# Patient Record
Sex: FEMALE | Race: WHITE | Employment: OTHER | URBAN - NONMETROPOLITAN AREA
[De-identification: names, ages, dates, MRNs, and addresses within clinical notes are randomized per-mention and may not be internally consistent; named-entity substitution may affect disease eponyms.]

---

## 2017-01-03 DIAGNOSIS — K21.9 ESOPHAGEAL REFLUX: Primary | ICD-10-CM

## 2017-01-04 RX ORDER — PANTOPRAZOLE SODIUM 40 MG/1
TABLET, DELAYED RELEASE ORAL
Qty: 90 TABLET | Refills: 2 | Status: SHIPPED | OUTPATIENT
Start: 2017-01-04 | End: 2017-10-11

## 2017-01-12 DIAGNOSIS — E78.5 HYPERLIPIDEMIA LDL GOAL <160: Primary | ICD-10-CM

## 2017-01-13 DIAGNOSIS — G47.00 PERSISTENT INSOMNIA: Primary | ICD-10-CM

## 2017-01-16 DIAGNOSIS — F32.A DEPRESSION: Primary | ICD-10-CM

## 2017-01-16 RX ORDER — CITALOPRAM HYDROBROMIDE 20 MG/1
20 TABLET ORAL DAILY
Qty: 90 TABLET | Refills: 0 | Status: SHIPPED | OUTPATIENT
Start: 2017-01-16 | End: 2017-04-21

## 2017-01-16 RX ORDER — SIMVASTATIN 40 MG
TABLET ORAL
Qty: 90 TABLET | Refills: 0 | Status: SHIPPED | OUTPATIENT
Start: 2017-01-16 | End: 2017-04-27

## 2017-01-16 NOTE — TELEPHONE ENCOUNTER
ZOLPIDEM 10MG TAB    Last Written Prescription Date: 11/21/2016  Last Fill Quantity: 30,  # refills: 0   Last Office Visit with [facility], [facility] or [facility] prescribing provider: 11/15/2016

## 2017-01-17 RX ORDER — ZOLPIDEM TARTRATE 10 MG/1
TABLET ORAL
Qty: 30 TABLET | Refills: 0 | Status: SHIPPED | OUTPATIENT
Start: 2017-01-17 | End: 2017-02-27

## 2017-01-23 DIAGNOSIS — H10.33 ACUTE CONJUNCTIVITIS OF BOTH EYES, UNSPECIFIED ACUTE CONJUNCTIVITIS TYPE: Primary | ICD-10-CM

## 2017-01-25 RX ORDER — GENTAMICIN SULFATE 3 MG/ML
SOLUTION/ DROPS OPHTHALMIC
Qty: 15 ML | Refills: 0 | Status: SHIPPED | OUTPATIENT
Start: 2017-01-25 | End: 2017-03-16

## 2017-01-25 NOTE — TELEPHONE ENCOUNTER
Gentamicin 0.3% op solution       Last Written Prescription Date:  11-7-2016  Last Fill Quantity: Not Specified but requested 5 each.,   # refills: 0  Last Office Visit with G, P or [facility] prescribing provider: 11-  Future Office visit:       Routing refill request to provider for review/approval because:  Drug not active on patient's medication list

## 2017-02-03 ENCOUNTER — OFFICE VISIT (OUTPATIENT)
Dept: FAMILY MEDICINE | Facility: OTHER | Age: 62
End: 2017-02-03
Attending: FAMILY MEDICINE
Payer: MEDICARE

## 2017-02-03 VITALS
WEIGHT: 172 LBS | DIASTOLIC BLOOD PRESSURE: 82 MMHG | TEMPERATURE: 98.9 F | OXYGEN SATURATION: 97 % | BODY MASS INDEX: 29.51 KG/M2 | HEART RATE: 50 BPM | SYSTOLIC BLOOD PRESSURE: 122 MMHG | RESPIRATION RATE: 16 BRPM

## 2017-02-03 DIAGNOSIS — L20.84 INTRINSIC ECZEMA: ICD-10-CM

## 2017-02-03 DIAGNOSIS — R73.03 PREDIABETES: Primary | ICD-10-CM

## 2017-02-03 DIAGNOSIS — E78.2 MIXED HYPERLIPIDEMIA: ICD-10-CM

## 2017-02-03 LAB
ALBUMIN SERPL-MCNC: 3.9 G/DL (ref 3.4–5)
ALP SERPL-CCNC: 98 U/L (ref 40–150)
ALT SERPL W P-5'-P-CCNC: 34 U/L (ref 0–50)
ANION GAP SERPL CALCULATED.3IONS-SCNC: 8 MMOL/L (ref 3–14)
AST SERPL W P-5'-P-CCNC: 19 U/L (ref 0–45)
BASOPHILS # BLD AUTO: 0.1 10E9/L (ref 0–0.2)
BASOPHILS NFR BLD AUTO: 0.8 %
BILIRUB SERPL-MCNC: 0.5 MG/DL (ref 0.2–1.3)
BUN SERPL-MCNC: 10 MG/DL (ref 7–30)
CALCIUM SERPL-MCNC: 8.9 MG/DL (ref 8.5–10.1)
CHLORIDE SERPL-SCNC: 107 MMOL/L (ref 94–109)
CO2 SERPL-SCNC: 25 MMOL/L (ref 20–32)
CREAT SERPL-MCNC: 0.76 MG/DL (ref 0.52–1.04)
DIFFERENTIAL METHOD BLD: NORMAL
EOSINOPHIL # BLD AUTO: 0.1 10E9/L (ref 0–0.7)
EOSINOPHIL NFR BLD AUTO: 1.2 %
ERYTHROCYTE [DISTWIDTH] IN BLOOD BY AUTOMATED COUNT: 12.5 % (ref 10–15)
GFR SERPL CREATININE-BSD FRML MDRD: 77 ML/MIN/1.7M2
GLUCOSE SERPL-MCNC: 105 MG/DL (ref 70–99)
HCT VFR BLD AUTO: 39.3 % (ref 35–47)
HGB BLD-MCNC: 13.3 G/DL (ref 11.7–15.7)
IMM GRANULOCYTES # BLD: 0 10E9/L (ref 0–0.4)
IMM GRANULOCYTES NFR BLD: 0.3 %
LYMPHOCYTES # BLD AUTO: 2.5 10E9/L (ref 0.8–5.3)
LYMPHOCYTES NFR BLD AUTO: 32.7 %
MCH RBC QN AUTO: 30.9 PG (ref 26.5–33)
MCHC RBC AUTO-ENTMCNC: 33.8 G/DL (ref 31.5–36.5)
MCV RBC AUTO: 91 FL (ref 78–100)
MONOCYTES # BLD AUTO: 0.7 10E9/L (ref 0–1.3)
MONOCYTES NFR BLD AUTO: 8.8 %
NEUTROPHILS # BLD AUTO: 4.2 10E9/L (ref 1.6–8.3)
NEUTROPHILS NFR BLD AUTO: 56.2 %
NRBC # BLD AUTO: 0 10*3/UL
NRBC BLD AUTO-RTO: 0 /100
PLATELET # BLD AUTO: 272 10E9/L (ref 150–450)
POTASSIUM SERPL-SCNC: 4.3 MMOL/L (ref 3.4–5.3)
PROT SERPL-MCNC: 8 G/DL (ref 6.8–8.8)
RBC # BLD AUTO: 4.31 10E12/L (ref 3.8–5.2)
SODIUM SERPL-SCNC: 140 MMOL/L (ref 133–144)
TSH SERPL DL<=0.005 MIU/L-ACNC: 1.98 MU/L (ref 0.4–4)
WBC # BLD AUTO: 7.5 10E9/L (ref 4–11)

## 2017-02-03 PROCEDURE — 36415 COLL VENOUS BLD VENIPUNCTURE: CPT | Performed by: FAMILY MEDICINE

## 2017-02-03 PROCEDURE — 99212 OFFICE O/P EST SF 10 MIN: CPT

## 2017-02-03 PROCEDURE — 85025 COMPLETE CBC W/AUTO DIFF WBC: CPT | Performed by: FAMILY MEDICINE

## 2017-02-03 PROCEDURE — 84443 ASSAY THYROID STIM HORMONE: CPT | Performed by: FAMILY MEDICINE

## 2017-02-03 PROCEDURE — 99213 OFFICE O/P EST LOW 20 MIN: CPT | Performed by: FAMILY MEDICINE

## 2017-02-03 PROCEDURE — 80053 COMPREHEN METABOLIC PANEL: CPT | Performed by: FAMILY MEDICINE

## 2017-02-03 RX ORDER — MOMETASONE FUROATE 1 MG/G
CREAM TOPICAL
Qty: 15 G | Refills: 0 | Status: SHIPPED | OUTPATIENT
Start: 2017-02-03 | End: 2018-02-15

## 2017-02-03 ASSESSMENT — PAIN SCALES - GENERAL: PAINLEVEL: NO PAIN (0)

## 2017-02-03 NOTE — Clinical Note
[facility] [place]  [address]  [place] [address]  [phone]      February 6, 2017      Mary Pemberton  [address]  [place] [address]        Dear Mary,    The results of your recent blood draw were normal.  Results for orders placed or performed in visit on 02/03/17   CBC with platelets differential   Result Value Ref Range    WBC 7.5 4.0 - 11.0 10e9/L    RBC Count 4.31 3.8 - 5.2 10e12/L    Hemoglobin 13.3 11.7 - 15.7 g/dL    Hematocrit 39.3 35.0 - 47.0 %    MCV 91 78 - 100 fl    MCH 30.9 26.5 - 33.0 pg    MCHC 33.8 31.5 - 36.5 g/dL    RDW 12.5 10.0 - 15.0 %    Platelet Count 272 150 - 450 10e9/L    Diff Method Automated Method     % Neutrophils 56.2 %    % Lymphocytes 32.7 %    % Monocytes 8.8 %    % Eosinophils 1.2 %    % Basophils 0.8 %    % Immature Granulocytes 0.3 %    Nucleated RBCs 0 0 /100    Absolute Neutrophil 4.2 1.6 - 8.3 10e9/L    Absolute Lymphocytes 2.5 0.8 - 5.3 10e9/L    Absolute Monocytes 0.7 0.0 - 1.3 10e9/L    Absolute Eosinophils 0.1 0.0 - 0.7 10e9/L    Absolute Basophils 0.1 0.0 - 0.2 10e9/L    Abs Immature Granulocytes 0.0 0 - 0.4 10e9/L    Absolute Nucleated RBC 0.0    Comprehensive metabolic panel   Result Value Ref Range    Sodium 140 133 - 144 mmol/L    Potassium 4.3 3.4 - 5.3 mmol/L    Chloride 107 94 - 109 mmol/L    Carbon Dioxide 25 20 - 32 mmol/L    Anion Gap 8 3 - 14 mmol/L    Glucose 105 (H) 70 - 99 mg/dL    Urea Nitrogen 10 7 - 30 mg/dL    Creatinine 0.76 0.52 - 1.04 mg/dL    GFR Estimate 77 >60 mL/min/1.7m2    GFR Estimate If Black >90   GFR Calc   >60 mL/min/1.7m2    Calcium 8.9 8.5 - 10.1 mg/dL    Bilirubin Total 0.5 0.2 - 1.3 mg/dL    Albumin 3.9 3.4 - 5.0 g/dL    Protein Total 8.0 6.8 - 8.8 g/dL    Alkaline Phosphatase 98 40 - 150 U/L    ALT 34 0 - 50 U/L    AST 19 0 - 45 U/L   TSH with free T4 reflex   Result Value Ref Range    TSH 1.98 0.40 - 4.00 mU/L   If you have any questions or concerns, please call myself or my nurse at  [phone].      Sincerely,      Sam Tanner MD

## 2017-02-03 NOTE — NURSING NOTE
"Chief Complaint   Patient presents with     Eye Problem     has had medication to treat but as soon as medication is done it come back       Initial /82 mmHg  Pulse 50  Temp(Src) 98.9  F (37.2  C)  Resp 16  Wt 172 lb (78.019 kg)  SpO2 97% Estimated body mass index is 29.51 kg/(m^2) as calculated from the following:    Height as of 11/7/16: 5' 4\" (1.626 m).    Weight as of this encounter: 172 lb (78.019 kg).  BP completed using cuff size: monae Khan      "

## 2017-02-03 NOTE — MR AVS SNAPSHOT
"              After Visit Summary   2/3/2017    Mary Pemberton    MRN: [id number]           Patient Information     Date Of Birth          1955        Visit Information        Provider Department      2/3/2017 11:00 AM Sam Tanner MD [facility]        Today's Diagnoses     Prediabetes    -  1     Intrinsic eczema         Dyslipidemia           Care Instructions    Apply mometasone to the affected area as prescibed.        Follow-ups after your visit        Follow-up notes from your care team     Return if symptoms worsen or fail to improve.      Who to contact     If you have questions or need follow up information about today's clinic visit or your schedule please contact [facility] directly at [phone].  Normal or non-critical lab and imaging results will be communicated to you by MyChart, letter or phone within 4 business days after the clinic has received the results. If you do not hear from us within 7 days, please contact the clinic through MyChart or phone. If you have a critical or abnormal lab result, we will notify you by phone as soon as possible.  Submit refill requests through [employer] or call your pharmacy and they will forward the refill request to us. Please allow 3 business days for your refill to be completed.          Additional Information About Your Visit        MyChart Information     [employer] lets you send messages to your doctor, view your test results, renew your prescriptions, schedule appointments and more. To sign up, go to [email or web address] . Click on \"Log in\" on the left side of the screen, which will take you to the Welcome page. Then click on \"Sign up Now\" on the right side of the page.     You will be asked to enter the access code listed below, as well as some personal information. Please follow the directions to create your username and password.     Your access code is: [email or web address]  Expires: 5/5/2017 10:13 AM     Your access code will "  in 90 days. If you need help or a new code, please call your [facility] or [phone].        Care EveryWhere ID     This is your Care EveryWhere ID. This could be used by other organizations to access your [facility] medical records  [id number]        Your Vitals Were     Pulse Temperature Respirations Pulse Oximetry          50 98.9  F (37.2  C) 16 97%         Blood Pressure from Last 3 Encounters:   17 122/82   11/15/16 115/70   16 102/62    Weight from Last 3 Encounters:   17 172 lb (78.019 kg)   11/15/16 178 lb (80.74 kg)   16 178 lb (80.74 kg)              We Performed the Following     CBC with platelets differential     Comprehensive metabolic panel     TSH with free T4 reflex          Today's Medication Changes          These changes are accurate as of: 2/3/17 11:59 PM.  If you have any questions, ask your nurse or doctor.               Start taking these medicines.        Dose/Directions    mometasone 0.1 % cream   Commonly known as:  ELOCON   Used for:  Intrinsic eczema   Started by:  Sam Tanner MD        Apply sparingly to affected area twice daily as needed.  Do not apply to face.   Quantity:  15 g   Refills:  0            Where to get your medicines      These medications were sent to [id number] [address] [id number]   [address] , [place] [address]     Phone:  [phone]    - mometasone 0.1 % cream             Primary Care Provider Office Phone # Fax #    Sam Tanner [phone]       [facility] [address]  [place] [address]        Thank you!     Thank you for choosing [facility]  for your care. Our goal is always to provide you with excellent care. Hearing back from our patients is one way we can continue to improve our services. Please take a few minutes to complete the written survey that you may receive in the mail after your visit with us. Thank you!             Your Updated Medication  List - Protect others around you: Learn how to safely use, store and throw away your medicines at www.disposemymeds.org.          This list is accurate as of: 2/3/17 11:59 PM.  Always use your most recent med list.                   Brand Name Dispense Instructions for use    ASPIRIN ADULT LOW STRENGTH 81 MG chewable tablet   Generic drug:  aspirin     36 tablet    Take 162 mg by mouth daily       citalopram 20 MG tablet    celeXA    90 tablet    Take 1 tablet (20 mg) by mouth daily       cyanocolbalamin 100 MCG tablet    vitamin  B-12     Take 50 mcg by mouth daily.       donepezil 10 MG tablet    ARICEPT    90 tablet    TAKE ONE TABLET BY MOUTH AT BEDTIME       gentamicin 0.3 % ophthalmic solution    GARAMYCIN    15 mL    INSTILL ONE DROP EVERY 4 HOURS TO  AFFECTED  EYE(S)  FOR  7  DAYS       LORazepam 0.5 MG tablet    ATIVAN    60 tablet    TAKE ONE TABLET BY MOUTH TWICE DAILY AS NEEDED FOR ANXIETY       mometasone 0.1 % cream    ELOCON    15 g    Apply sparingly to affected area twice daily as needed.  Do not apply to face.       OMEGA-3 FISH OIL PO          * OXYBUTYNIN CHLORIDE PO      Take 5 mg by mouth At Bedtime       * oxybutynin 5 MG 24 hr tablet    DITROPAN-XL    90 tablet    TAKE ONE TABLET BY MOUTH ONCE DAILY       pantoprazole 40 MG EC tablet    PROTONIX    90 tablet    TAKE ONE TABLET BY MOUTH ONCE DAILY       simvastatin 40 MG tablet    ZOCOR    90 tablet    TAKE ONE TABLET BY MOUTH AT BEDTIME       zolpidem 10 MG tablet    AMBIEN    30 tablet    TAKE ONE TABLET BY MOUTH  AT BEDTIME AS NEEDED FOR SLEEP       * Notice:  This list has 2 medication(s) that are the same as other medications prescribed for you. Read the directions carefully, and ask your doctor or other care provider to review them with you.

## 2017-02-04 NOTE — PROGRESS NOTES
SUBJECTIVE:  Mary Pemberton, 61 year old, female is seen with persistent eye symptoms. She has had previous conjunctivitis and was placed on gentamycin drops. The will note improvement then recurrence.  More recently, she has developed scaling and itching of her lids and periorbital region.    She requests fasting labs    Denies fever, headache, visual disturbance, dizziness, focal weakness, numbness, tingling, paresthesias, tremor, or gait disturbance.      Current Outpatient Prescriptions   Medication Sig Dispense Refill     mometasone (ELOCON) 0.1 % cream Apply sparingly to affected area twice daily as needed.  Do not apply to face. 15 g 0     gentamicin (GARAMYCIN) 0.3 % ophthalmic solution INSTILL ONE DROP EVERY 4 HOURS TO  AFFECTED  EYE(S)  FOR  7  DAYS 15 mL 0     zolpidem (AMBIEN) 10 MG tablet TAKE ONE TABLET BY MOUTH  AT BEDTIME AS NEEDED FOR SLEEP 30 tablet 0     simvastatin (ZOCOR) 40 MG tablet TAKE ONE TABLET BY MOUTH AT BEDTIME 90 tablet 0     citalopram (CELEXA) 20 MG tablet Take 1 tablet (20 mg) by mouth daily 90 tablet 0     pantoprazole (PROTONIX) 40 MG EC tablet TAKE ONE TABLET BY MOUTH ONCE DAILY 90 tablet 2     LORazepam (ATIVAN) 0.5 MG tablet TAKE ONE TABLET BY MOUTH TWICE DAILY AS NEEDED FOR ANXIETY 60 tablet 0     oxybutynin (DITROPAN-XL) 5 MG 24 hr tablet TAKE ONE TABLET BY MOUTH ONCE DAILY 90 tablet 3     donepezil (ARICEPT) 10 MG tablet TAKE ONE TABLET BY MOUTH AT BEDTIME 90 tablet 1     OXYBUTYNIN CHLORIDE PO Take 5 mg by mouth At Bedtime       Omega-3 Fatty Acids (OMEGA-3 FISH OIL PO)        aspirin (ASPIRIN ADULT LOW STRENGTH) 81 MG chewable tablet Take 162 mg by mouth daily  36 tablet      cyanocolbalamin (VITAMIN  B-12) 100 MCG tablet Take 50 mcg by mouth daily.          Allergies   Allergen Reactions     Codeine Nausea and Vomiting     Codeine Phosphate      Quinolones      Pt intolerance to steroids also       Past Medical History   Diagnosis Date     Malignant melanoma of skin of  upper limb, including shoulder (H) 9/4/2001     Melanoma of skin, site unspecified 4/16/2002     Other and unspecified hyperlipidemia 9/12/2002     Lump or mass in breast 1/3/2000     Rash and other nonspecific skin eruption 11/7/2005     Localized superficial swelling, mass, or lump 9/9/2004     Other specified episodic mood disorder 8/16/2002     Depressive disorder, not elsewhere classified 4/22/2008     Pre-diabetes 9/13/2011     Dementia 10/19/2012     GERD (gastroesophageal reflux disease) 11/14/2012     CAD (coronary atherosclerotic disease) 11/14/2012     H/O: stroke 4/28/2015     CVA (cerebral infarction) 04/28/2015     with TPA     Atrial fibrillation (H)      with Bradycardia     Melanoma of skin (H) 4/16/2002      Problem list name updated by automated process. Provider to review     Past Surgical History   Procedure Laterality Date     Brain bx       Arm       LT     Open heart       Colonoscopy  [id number]     Family History   Problem Relation Age of Onset     CANCER Father      lung     CANCER Maternal Grandfather      Hypertension Mother      Social History     Social History     Marital Status:      Spouse Name: N/A     Number of Children: N/A     Years of Education: N/A     Occupational History      Retired      Disabled     Social History Main Topics     Smoking status: Former Smoker -- 1.00 packs/day for 20 years     Types: Cigarettes     Quit date: 06/06/2002     Smokeless tobacco: Never Used      Comment: year quit 2001; no passive exposure.     Alcohol Use: No     Drug Use: No     Sexual Activity: Not on file     Other Topics Concern     Blood Transfusions Yes     Caffeine Concern Yes     coffee - 2 cups daily     Exercise Yes     walking daily     Parent/Sibling W/ Cabg, Mi Or Angioplasty Before 65f 55m? No     Social History Narrative         Review Of Systems  Constitutional, HEENT, cardiovascular, pulmonary, gi and gu systems are negative, except as otherwise  noted.    OBJECTIVE:  Vitals: B/P: 122/82, T: 98.9, P: 50, R: 16    Exam:  Physical Exam   Constitutional: She is oriented to person, place, and time. She appears well-developed and well-nourished. No distress.   Eyes: Conjunctivae and EOM are normal. Pupils are equal, round, and reactive to light.   There is mild erythema and scaling noted in the periorbital region bilaterally.   Neurological: She is alert and oriented to person, place, and time.   Psychiatric: She has a normal mood and affect.     Other exam not repeated    Labs:  Office Visit on 05/20/2016   Component Date Value Ref Range Status     WBC 05/20/2016 7.2  4.0 - 11.0 10e9/L Final     RBC Count 05/20/2016 4.19  3.8 - 5.2 10e12/L Final     Hemoglobin 05/20/2016 13.0  11.7 - 15.7 g/dL Final     Hematocrit 05/20/2016 38.8  35.0 - 47.0 % Final     MCV 05/20/2016 93  78 - 100 fl Final     MCH 05/20/2016 31.0  26.5 - 33.0 pg Final     MCHC 05/20/2016 33.5  31.5 - 36.5 g/dL Final     RDW 05/20/2016 12.8  10.0 - 15.0 % Final     Platelet Count 05/20/2016 247  150 - 450 10e9/L Final     Diff Method 05/20/2016 Automated Method   Final     % Neutrophils 05/20/2016 52.5   Final     % Lymphocytes 05/20/2016 37.1   Final     % Monocytes 05/20/2016 8.1   Final     % Eosinophils 05/20/2016 1.3   Final     % Basophils 05/20/2016 0.7   Final     % Immature Granulocytes 05/20/2016 0.3   Final     Nucleated RBCs 05/20/2016 0  0 /100 Final     Absolute Neutrophil 05/20/2016 3.8  1.6 - 8.3 10e9/L Final     Absolute Lymphocytes 05/20/2016 2.7  0.8 - 5.3 10e9/L Final     Absolute Monocytes 05/20/2016 0.6  0.0 - 1.3 10e9/L Final     Absolute Eosinophils 05/20/2016 0.1  0.0 - 0.7 10e9/L Final     Absolute Basophils 05/20/2016 0.1  0.0 - 0.2 10e9/L Final     Abs Immature Granulocytes 05/20/2016 0.0  0 - 0.4 10e9/L Final     Absolute Nucleated RBC 05/20/2016 0.0   Final     Cholesterol 05/20/2016 232* <200 mg/dL Final    Desirable:       <200 mg/dl     Triglycerides 05/20/2016  295* <150 mg/dL Final    Comment: Borderline high:  150-199 mg/dl   High:             200-499 mg/dl   Very high:       >499 mg/dl   Fasting specimen       HDL Cholesterol 05/20/2016 42* >49 mg/dL Final     LDL Cholesterol Calculated 05/20/2016 131* <100 mg/dL Final    Comment: Above desirable:  100-129 mg/dl   Borderline High:  130-159 mg/dL   High:             160-189 mg/dL   Very high:       >189 mg/dl       Non HDL Cholesterol 05/20/2016 190* <130 mg/dL Final    Comment: Above Desirable:  130-159 mg/dl   Borderline high:  160-189 mg/dl   High:             190-219 mg/dl   Very high:       >219 mg/dl       Sodium 05/20/2016 140  133 - 144 mmol/L Final     Potassium 05/20/2016 4.2  3.4 - 5.3 mmol/L Final     Chloride 05/20/2016 107  94 - 109 mmol/L Final     Carbon Dioxide 05/20/2016 26  20 - 32 mmol/L Final     Anion Gap 05/20/2016 7  3 - 14 mmol/L Final     Glucose 05/20/2016 103* 70 - 99 mg/dL Final     Urea Nitrogen 05/20/2016 9  7 - 30 mg/dL Final     Creatinine 05/20/2016 0.74  0.52 - 1.04 mg/dL Final     GFR Estimate 05/20/2016 80  >60 mL/min/1.7m2 Final    Non  GFR Calc     GFR Estimate If Black 05/20/2016   >60 mL/min/1.7m2 Final                    Value:>90   GFR Calc       Calcium 05/20/2016 9.0  8.5 - 10.1 mg/dL Final     Bilirubin Total 05/20/2016 0.5  0.2 - 1.3 mg/dL Final     Albumin 05/20/2016 4.0  3.4 - 5.0 g/dL Final     Protein Total 05/20/2016 7.8  6.8 - 8.8 g/dL Final     Alkaline Phosphatase 05/20/2016 91  40 - 150 U/L Final     ALT 05/20/2016 48  0 - 50 U/L Final     AST 05/20/2016 26  0 - 45 U/L Final     Color Urine 05/20/2016 Yellow   Final     Appearance Urine 05/20/2016 Clear   Final     Glucose Urine 05/20/2016 Negative  NEG mg/dL Final     Bilirubin Urine 05/20/2016 * NEG Final                    Value:Small  This is an unconfirmed screening test result. A positive result may be false.       Ketones Urine 05/20/2016 Negative  NEG mg/dL Final     Specific  Gravity Urine 05/20/2016 1.005  1.003 - 1.035 Final     Blood Urine 05/20/2016 Trace* NEG Final     pH Urine 05/20/2016 8.0  4.7 - 8.0 pH Final     Protein Albumin Urine 05/20/2016 10* NEG mg/dL Final     Urobilinogen mg/dL 05/20/2016 Normal  0.0 - 2.0 mg/dL Final     Nitrite Urine 05/20/2016 Negative  NEG Final     Leukocyte Esterase Urine 05/20/2016 Trace* NEG Final     Source 05/20/2016 Midstream Urine   Final     RBC Urine 05/20/2016 <1  0 - 2 /HPF Final     WBC Urine 05/20/2016 1  0 - 2 /HPF Final     Mucous Urine 05/20/2016 Present* NEG /LPF Final     Amorphous Crystals 05/20/2016 Few* NEG /HPF Final     Hemoglobin A1C 05/20/2016 5.9  4.3 - 6.0 % Final     Estimated Average Glucose 05/20/2016 123   Final       ASSESSMENT/PLAN:  Intrinsic eczema  Cautions mometasone cream as written  - mometasone (ELOCON) 0.1 % cream; Apply sparingly to affected area twice daily as needed.  Do not apply to face.    Prediabetes  Labs are drawn.    Dyslipidemia  As above.  - CBC with platelets differential  - Comprehensive metabolic panel  - TSH with free T4 reflex            Sam Tanner MD

## 2017-02-27 DIAGNOSIS — G47.00 PERSISTENT INSOMNIA: ICD-10-CM

## 2017-02-28 RX ORDER — ZOLPIDEM TARTRATE 10 MG/1
TABLET ORAL
Qty: 30 TABLET | Refills: 0 | Status: SHIPPED | OUTPATIENT
Start: 2017-02-28 | End: 2017-04-17

## 2017-03-16 ENCOUNTER — HOSPITAL ENCOUNTER (EMERGENCY)
Facility: HOSPITAL | Age: 62
Discharge: ANOTHER HOSPITAL | End: 2017-03-16
Attending: FAMILY MEDICINE | Admitting: FAMILY MEDICINE
Payer: MEDICARE

## 2017-03-16 VITALS
OXYGEN SATURATION: 95 % | HEIGHT: 64 IN | DIASTOLIC BLOOD PRESSURE: 83 MMHG | SYSTOLIC BLOOD PRESSURE: 120 MMHG | RESPIRATION RATE: 16 BRPM | TEMPERATURE: 97.6 F

## 2017-03-16 DIAGNOSIS — I69.319 COGNITIVE DEFICIT FOLLOWING CEREBROVASCULAR ACCIDENT (CVA): ICD-10-CM

## 2017-03-16 DIAGNOSIS — G45.9 TRANSIENT CEREBRAL ISCHEMIA, UNSPECIFIED TYPE: ICD-10-CM

## 2017-03-16 LAB
ALBUMIN SERPL-MCNC: 3.7 G/DL (ref 3.4–5)
ALP SERPL-CCNC: 84 U/L (ref 40–150)
ALT SERPL W P-5'-P-CCNC: 33 U/L (ref 0–50)
ANION GAP SERPL CALCULATED.3IONS-SCNC: 7 MMOL/L (ref 3–14)
APTT PPP: 26 SEC (ref 24–37)
AST SERPL W P-5'-P-CCNC: 20 U/L (ref 0–45)
BASOPHILS # BLD AUTO: 0.1 10E9/L (ref 0–0.2)
BASOPHILS NFR BLD AUTO: 0.8 %
BILIRUB SERPL-MCNC: 0.4 MG/DL (ref 0.2–1.3)
BUN SERPL-MCNC: 11 MG/DL (ref 7–30)
CALCIUM SERPL-MCNC: 8.3 MG/DL (ref 8.5–10.1)
CHLORIDE SERPL-SCNC: 103 MMOL/L (ref 94–109)
CO2 SERPL-SCNC: 25 MMOL/L (ref 20–32)
CREAT SERPL-MCNC: 0.84 MG/DL (ref 0.52–1.04)
DIFFERENTIAL METHOD BLD: ABNORMAL
EOSINOPHIL # BLD AUTO: 0.1 10E9/L (ref 0–0.7)
EOSINOPHIL NFR BLD AUTO: 1.2 %
ERYTHROCYTE [DISTWIDTH] IN BLOOD BY AUTOMATED COUNT: 12.6 % (ref 10–15)
GFR SERPL CREATININE-BSD FRML MDRD: 69 ML/MIN/1.7M2
GLUCOSE SERPL-MCNC: 131 MG/DL (ref 70–99)
HCT VFR BLD AUTO: 33.9 % (ref 35–47)
HGB BLD-MCNC: 11.7 G/DL (ref 11.7–15.7)
IMM GRANULOCYTES # BLD: 0 10E9/L (ref 0–0.4)
IMM GRANULOCYTES NFR BLD: 0.4 %
INR PPP: 1.02 (ref 0.8–1.2)
LYMPHOCYTES # BLD AUTO: 2.9 10E9/L (ref 0.8–5.3)
LYMPHOCYTES NFR BLD AUTO: 38.8 %
MCH RBC QN AUTO: 31.3 PG (ref 26.5–33)
MCHC RBC AUTO-ENTMCNC: 34.5 G/DL (ref 31.5–36.5)
MCV RBC AUTO: 91 FL (ref 78–100)
MONOCYTES # BLD AUTO: 0.7 10E9/L (ref 0–1.3)
MONOCYTES NFR BLD AUTO: 9.7 %
NEUTROPHILS # BLD AUTO: 3.7 10E9/L (ref 1.6–8.3)
NEUTROPHILS NFR BLD AUTO: 49.1 %
NRBC # BLD AUTO: 0 10*3/UL
NRBC BLD AUTO-RTO: 0 /100
PLATELET # BLD AUTO: 242 10E9/L (ref 150–450)
POTASSIUM SERPL-SCNC: 3.9 MMOL/L (ref 3.4–5.3)
PROT SERPL-MCNC: 7 G/DL (ref 6.8–8.8)
RBC # BLD AUTO: 3.74 10E12/L (ref 3.8–5.2)
SODIUM SERPL-SCNC: 135 MMOL/L (ref 133–144)
TROPONIN I SERPL-MCNC: NORMAL UG/L (ref 0–0.04)
WBC # BLD AUTO: 7.5 10E9/L (ref 4–11)

## 2017-03-16 PROCEDURE — 85730 THROMBOPLASTIN TIME PARTIAL: CPT | Performed by: FAMILY MEDICINE

## 2017-03-16 PROCEDURE — 70450 CT HEAD/BRAIN W/O DYE: CPT | Mod: TC

## 2017-03-16 PROCEDURE — 99291 CRITICAL CARE FIRST HOUR: CPT | Mod: 25

## 2017-03-16 PROCEDURE — 85610 PROTHROMBIN TIME: CPT | Performed by: FAMILY MEDICINE

## 2017-03-16 PROCEDURE — 80053 COMPREHEN METABOLIC PANEL: CPT | Performed by: FAMILY MEDICINE

## 2017-03-16 PROCEDURE — 25000128 H RX IP 250 OP 636: Performed by: FAMILY MEDICINE

## 2017-03-16 PROCEDURE — 84484 ASSAY OF TROPONIN QUANT: CPT | Performed by: FAMILY MEDICINE

## 2017-03-16 PROCEDURE — 93010 ELECTROCARDIOGRAM REPORT: CPT | Performed by: INTERNAL MEDICINE

## 2017-03-16 PROCEDURE — 70498 CT ANGIOGRAPHY NECK: CPT | Mod: TC

## 2017-03-16 PROCEDURE — A9270 NON-COVERED ITEM OR SERVICE: HCPCS | Mod: GY

## 2017-03-16 PROCEDURE — 85025 COMPLETE CBC W/AUTO DIFF WBC: CPT | Performed by: FAMILY MEDICINE

## 2017-03-16 PROCEDURE — 70496 CT ANGIOGRAPHY HEAD: CPT | Mod: TC

## 2017-03-16 PROCEDURE — 71010 ZZHC CHEST ONE VIEW: CPT | Mod: TC

## 2017-03-16 PROCEDURE — 93005 ELECTROCARDIOGRAM TRACING: CPT

## 2017-03-16 PROCEDURE — 00000146 ZZHCL STATISTIC GLUCOSE BY METER IP: Performed by: FAMILY MEDICINE

## 2017-03-16 PROCEDURE — 25000132 ZZH RX MED GY IP 250 OP 250 PS 637: Mod: GY

## 2017-03-16 PROCEDURE — 36415 COLL VENOUS BLD VENIPUNCTURE: CPT | Performed by: FAMILY MEDICINE

## 2017-03-16 PROCEDURE — 99285 EMERGENCY DEPT VISIT HI MDM: CPT | Performed by: FAMILY MEDICINE

## 2017-03-16 RX ORDER — ACETAMINOPHEN 325 MG/1
TABLET ORAL
Status: COMPLETED
Start: 2017-03-16 | End: 2017-03-16

## 2017-03-16 RX ORDER — IOPAMIDOL 612 MG/ML
100 INJECTION, SOLUTION INTRAVASCULAR ONCE
Status: COMPLETED | OUTPATIENT
Start: 2017-03-16 | End: 2017-03-16

## 2017-03-16 RX ORDER — ACETAMINOPHEN 325 MG/1
650 TABLET ORAL ONCE
Status: COMPLETED | OUTPATIENT
Start: 2017-03-16 | End: 2017-03-16

## 2017-03-16 RX ADMIN — ACETAMINOPHEN 650 MG: 325 TABLET ORAL at 23:49

## 2017-03-16 RX ADMIN — IOPAMIDOL 100 ML: 612 INJECTION, SOLUTION INTRAVASCULAR at 21:15

## 2017-03-16 RX ADMIN — ACETAMINOPHEN 650 MG: 325 TABLET, FILM COATED ORAL at 23:49

## 2017-03-16 RX ADMIN — SODIUM CHLORIDE 500 ML: 9 INJECTION, SOLUTION INTRAVENOUS at 20:08

## 2017-03-16 NOTE — ED NOTES
Presents to triage with  for slurred speech that started approx 25 mins ago.  Brought right back to room 3 pt refused a w/c, ambulatory steady on feet.

## 2017-03-16 NOTE — ED PROVIDER NOTES
"eMERGENCY dEPARTMENT eNCOUnter        CHIEF COMPLAINT    Chief Complaint   Patient presents with     Slurred Speech     Started at 1700       HPI    Mary Pemberton is a 61 year old female who presents with slurred speech which started 1 1/2 hours ago.  She had 2 CVAs in 2015 in [place] that presented the same way.  The first she was given TPA and symptoms resolved.  Then had another stroke 10 days later.  After evaluation she was told she had a \"blocked vessel\" deep in her brain.  She is on an ASA.  She also has a history of metastatic melanoma with a tumor in her right atrium that was resected, the atrium repaired with porcine tissue.  She also had a \"dark spot\" in her brain that was also resected but  said this was non melanoma.    Her  [place] says she started with slurred speech tonight.  They came in by private car, it's a little unclear why the delay.    REVIEW OF SYSTEMS    Cardiac: no Chest Pain, No syncope  Respiratory: No cough or hemoptysis  GI: No Vomiting or Diarrhea  : No Dysuria or Hematuria  General: No Fever or Chills  All other systems reviewed and are negative.    PAST MEDICAL & SURGICAL HISTORY    Past Medical History   Diagnosis Date     Atrial fibrillation (H)      with Bradycardia     CAD (coronary atherosclerotic disease) 11/14/2012     CVA (cerebral infarction) 04/28/2015     with TPA     Dementia 10/19/2012     Depressive disorder, not elsewhere classified 4/22/2008     GERD (gastroesophageal reflux disease) 11/14/2012     H/O: stroke 4/28/2015     Localized superficial swelling, mass, or lump 9/9/2004     Lump or mass in breast 1/3/2000     Malignant melanoma of skin of upper limb, including shoulder (H) 9/4/2001     Melanoma of skin (H) 4/16/2002      Problem list name updated by automated process. Provider to review     Melanoma of skin, site unspecified 4/16/2002     Other and unspecified hyperlipidemia 9/12/2002     Other specified episodic mood disorder 8/16/2002     " Pre-diabetes 9/13/2011     Rash and other nonspecific skin eruption 11/7/2005     Past Surgical History   Procedure Laterality Date     Brain bx       Arm       LT     Open heart       Colonoscopy  [id number]       CURRENT MEDICATIONS    Current Outpatient Rx   Medication Sig Dispense Refill     zolpidem (AMBIEN) 10 MG tablet TAKE ONE TABLET BY MOUTH AT BEDTIME AS NEEDED FOR SLEEP 30 tablet 0     simvastatin (ZOCOR) 40 MG tablet TAKE ONE TABLET BY MOUTH AT BEDTIME 90 tablet 0     citalopram (CELEXA) 20 MG tablet Take 1 tablet (20 mg) by mouth daily 90 tablet 0     pantoprazole (PROTONIX) 40 MG EC tablet TAKE ONE TABLET BY MOUTH ONCE DAILY 90 tablet 2     LORazepam (ATIVAN) 0.5 MG tablet TAKE ONE TABLET BY MOUTH TWICE DAILY AS NEEDED FOR ANXIETY 60 tablet 0     donepezil (ARICEPT) 10 MG tablet TAKE ONE TABLET BY MOUTH AT BEDTIME 90 tablet 1     Omega-3 Fatty Acids (OMEGA-3 FISH OIL PO)        aspirin (ASPIRIN ADULT LOW STRENGTH) 81 MG chewable tablet Take 162 mg by mouth daily  36 tablet      cyanocolbalamin (VITAMIN  B-12) 100 MCG tablet Take 50 mcg by mouth daily.       mometasone (ELOCON) 0.1 % cream Apply sparingly to affected area twice daily as needed.  Do not apply to face. 15 g 0     OXYBUTYNIN CHLORIDE PO Take 5 mg by mouth At Bedtime         ALLERGIES    Allergies   Allergen Reactions     Codeine Nausea and Vomiting     Codeine Phosphate      Quinolones      Pt intolerance to steroids also       SOCIAL & FAMILY HISTORY    Social History     Social History     Marital status:      Spouse name: N/A     Number of children: N/A     Years of education: N/A     Occupational History      Retired      Disabled     Social History Main Topics     Smoking status: Former Smoker     Packs/day: 1.00     Years: 20.00     Types: Cigarettes     Quit date: 6/6/2002     Smokeless tobacco: Never Used      Comment: year quit 2001; no passive exposure.     Alcohol use No     Drug use: No     Sexual activity: Not on file  "    Other Topics Concern     Blood Transfusions Yes     Caffeine Concern Yes     coffee - 2 cups daily     Exercise Yes     walking daily     Parent/Sibling W/ Cabg, Mi Or Angioplasty Before 65f 55m? No     Social History Narrative     Family History   Problem Relation Age of Onset     CANCER Father      lung     CANCER Maternal Grandfather      Hypertension Mother        PHYSICAL EXAM    VITAL SIGNS: /82  Temp 97.6  F (36.4  C) (Oral)  Resp 16  Ht 1.626 m (5' 4\")  SpO2 96%   Constitutional:  Well developed, well nourished, no acute distress, she has very slight dysarthria which is rapidly improving.  She can state her name, identifies her , doesn't know what hospital she's in.  HENT:  Atraumatic, moist mucus membranes  Neck: supple, no JVD   Respiratory:  Lungs Clear, no retractions   Cardiovascular:  bradycardic rate, no murmurs  Vascular: Radial pulses 2+ equal bilaterally  GI:  Soft, nontender, normal bowel sounds  Musculoskeletal:  No edema, no acute deformities  Integument:  Skin warm and dry, no petechiae   Neurologic: moving all extremities, GCS 15, slight memory defiicits, tongue midline, normal stregnth, normal reflexes, FAST negative,  EOMIPsych: Pleasant affect, no hallucinations    National Institutes of Health Stroke Scale  Exam Interval: Baseline   Score    Level of consciousness: (0)   Alert, keenly responsive    LOC questions: (0)   Answers both questions correctly    LOC commands: (0)   Performs both tasks correctly    Best gaze: (0)   Normal    Visual: (0)   No visual loss    Facial palsy: (0)   Normal symmetrical movements    Motor arm (left): (0)   No drift    Motor arm (right): (0)   No drift    Motor leg (left): (0)   No drift    Motor leg (right): (0)   No drift    Limb ataxia: (0)   Absent    Sensory: (0)   Normal- no sensory loss    Best language: (0)   Normal- no aphasia    Dysarthria: (1)   Mild to moderate dysarthria    Extinction and inattention: (0)   No abnormality        " "Total Score:  1         EKG    Interpreted by emergency department physician.  Likely a fib with slow ventricular response.        ED COURSE & MEDICAL DECISION MAKING    Pertinent Labs & Imaging studies reviewed and interpreted. (See chart for details)  The patient's symptoms rapidly improved.    See chart for details of medications given during the ED stay.    Vitals:    03/16/17 1850 03/16/17 1855 03/16/17 1859 03/16/17 1900   BP: (!) 101/32 120/79 111/73 112/82   Resp:   16    Temp:   97.6  F (36.4  C)    TempSrc:   Oral    SpO2: 97% 98% 96% 96%   Height:   1.626 m (5' 4\")          FINAL IMPRESSION    1. Transient cerebral ischemia, unspecified type    2. Cognitive deficit following cerebrovascular accident (CVA)        Plan: Discussed with Dr. Martinez, stroke neurologist at the  of .  She will need stroke workup but is not a thrombolytic candidate.  Family would like her admitted here, spoke with Dr. Null, hospitalist, will proceed with CTA tonight and review this with neurologist before deciding on definite disposition.  Family updated.             Lida Alejandro MD  03/18/17 9298    "

## 2017-03-17 ENCOUNTER — ALLIED HEALTH/NURSE VISIT (OUTPATIENT)
Dept: NEUROLOGY | Facility: CLINIC | Age: 62
DRG: 069 | End: 2017-03-17
Attending: PSYCHIATRY & NEUROLOGY
Payer: MEDICARE

## 2017-03-17 ENCOUNTER — HOSPITAL ENCOUNTER (INPATIENT)
Facility: CLINIC | Age: 62
LOS: 1 days | Discharge: HOME | DRG: 069 | End: 2017-03-18
Attending: EMERGENCY MEDICINE | Admitting: PSYCHIATRY & NEUROLOGY
Payer: MEDICARE

## 2017-03-17 ENCOUNTER — APPOINTMENT (OUTPATIENT)
Dept: MRI IMAGING | Facility: CLINIC | Age: 62
DRG: 069 | End: 2017-03-17
Attending: EMERGENCY MEDICINE
Payer: MEDICARE

## 2017-03-17 ENCOUNTER — APPOINTMENT (OUTPATIENT)
Dept: CARDIOLOGY | Facility: CLINIC | Age: 62
DRG: 069 | End: 2017-03-17
Attending: STUDENT IN AN ORGANIZED HEALTH CARE EDUCATION/TRAINING PROGRAM
Payer: MEDICARE

## 2017-03-17 DIAGNOSIS — I48.91 ATRIAL FIBRILLATION, UNSPECIFIED TYPE (H): ICD-10-CM

## 2017-03-17 DIAGNOSIS — I25.10 ATHEROSCLEROSIS OF NATIVE CORONARY ARTERY OF NATIVE HEART WITHOUT ANGINA PECTORIS: ICD-10-CM

## 2017-03-17 DIAGNOSIS — G45.9 TIA (TRANSIENT ISCHEMIC ATTACK): ICD-10-CM

## 2017-03-17 DIAGNOSIS — Z86.73 PERSONAL HISTORY OF TRANSIENT CEREBRAL ISCHEMIA: ICD-10-CM

## 2017-03-17 DIAGNOSIS — R47.89 SPELL OF CHANGE IN SPEECH: Primary | ICD-10-CM

## 2017-03-17 DIAGNOSIS — R56.9 SEIZURES (H): Primary | ICD-10-CM

## 2017-03-17 LAB
ALBUMIN UR-MCNC: NEGATIVE MG/DL
ANION GAP SERPL CALCULATED.3IONS-SCNC: 9 MMOL/L (ref 3–14)
APPEARANCE UR: CLEAR
APTT PPP: 26 SEC (ref 22–37)
BILIRUB UR QL STRIP: NEGATIVE
BUN SERPL-MCNC: 13 MG/DL (ref 7–30)
CALCIUM SERPL-MCNC: 8.5 MG/DL (ref 8.5–10.1)
CHLORIDE SERPL-SCNC: 110 MMOL/L (ref 94–109)
CO2 SERPL-SCNC: 23 MMOL/L (ref 20–32)
COLOR UR AUTO: NORMAL
CREAT SERPL-MCNC: 0.72 MG/DL (ref 0.52–1.04)
ERYTHROCYTE [DISTWIDTH] IN BLOOD BY AUTOMATED COUNT: 13 % (ref 10–15)
GFR SERPL CREATININE-BSD FRML MDRD: 82 ML/MIN/1.7M2
GLUCOSE BLDC GLUCOMTR-MCNC: 113 MG/DL (ref 70–99)
GLUCOSE BLDC GLUCOMTR-MCNC: 117 MG/DL (ref 70–99)
GLUCOSE SERPL-MCNC: 105 MG/DL (ref 70–99)
GLUCOSE UR STRIP-MCNC: NEGATIVE MG/DL
HBA1C MFR BLD: 5.8 % (ref 4.3–6)
HCT VFR BLD AUTO: 35.5 % (ref 35–47)
HGB BLD-MCNC: 11.7 G/DL (ref 11.7–15.7)
HGB UR QL STRIP: NEGATIVE
INR PPP: 1.03 (ref 0.86–1.14)
INTERPRETATION ECG - MUSE: NORMAL
KETONES UR STRIP-MCNC: NEGATIVE MG/DL
LEUKOCYTE ESTERASE UR QL STRIP: NEGATIVE
MCH RBC QN AUTO: 30.8 PG (ref 26.5–33)
MCHC RBC AUTO-ENTMCNC: 33 G/DL (ref 31.5–36.5)
MCV RBC AUTO: 93 FL (ref 78–100)
MRSA DNA SPEC QL NAA+PROBE: NORMAL
NITRATE UR QL: NEGATIVE
PH UR STRIP: 6.5 PH (ref 5–7)
PLATELET # BLD AUTO: 214 10E9/L (ref 150–450)
POTASSIUM SERPL-SCNC: 4.2 MMOL/L (ref 3.4–5.3)
RBC # BLD AUTO: 3.8 10E12/L (ref 3.8–5.2)
SODIUM SERPL-SCNC: 142 MMOL/L (ref 133–144)
SP GR UR STRIP: 1 (ref 1–1.03)
SPECIMEN SOURCE: NORMAL
TROPONIN I SERPL-MCNC: NORMAL UG/L (ref 0–0.04)
URN SPEC COLLECT METH UR: NORMAL
UROBILINOGEN UR STRIP-MCNC: NORMAL MG/DL (ref 0–2)
WBC # BLD AUTO: 7.1 10E9/L (ref 4–11)

## 2017-03-17 PROCEDURE — 93005 ELECTROCARDIOGRAM TRACING: CPT | Performed by: EMERGENCY MEDICINE

## 2017-03-17 PROCEDURE — 85027 COMPLETE CBC AUTOMATED: CPT | Performed by: EMERGENCY MEDICINE

## 2017-03-17 PROCEDURE — 00000146 ZZHCL STATISTIC GLUCOSE BY METER IP

## 2017-03-17 PROCEDURE — 84484 ASSAY OF TROPONIN QUANT: CPT | Performed by: EMERGENCY MEDICINE

## 2017-03-17 PROCEDURE — A9270 NON-COVERED ITEM OR SERVICE: HCPCS | Mod: GY | Performed by: EMERGENCY MEDICINE

## 2017-03-17 PROCEDURE — 25000132 ZZH RX MED GY IP 250 OP 250 PS 637: Mod: GY | Performed by: PSYCHIATRY & NEUROLOGY

## 2017-03-17 PROCEDURE — 99284 EMERGENCY DEPT VISIT MOD MDM: CPT | Mod: Z6 | Performed by: EMERGENCY MEDICINE

## 2017-03-17 PROCEDURE — 12000001 ZZH R&B MED SURG/OB UMMC

## 2017-03-17 PROCEDURE — 87641 MR-STAPH DNA AMP PROBE: CPT | Performed by: EMERGENCY MEDICINE

## 2017-03-17 PROCEDURE — A9270 NON-COVERED ITEM OR SERVICE: HCPCS | Mod: GY | Performed by: PSYCHIATRY & NEUROLOGY

## 2017-03-17 PROCEDURE — 70553 MRI BRAIN STEM W/O & W/DYE: CPT

## 2017-03-17 PROCEDURE — 85730 THROMBOPLASTIN TIME PARTIAL: CPT | Performed by: EMERGENCY MEDICINE

## 2017-03-17 PROCEDURE — 93306 TTE W/DOPPLER COMPLETE: CPT | Mod: 26 | Performed by: INTERNAL MEDICINE

## 2017-03-17 PROCEDURE — 40000264 ECHO COMPLETE BUBBLE STUDY WITH OPTISON

## 2017-03-17 PROCEDURE — 95951 ZZHC EEG VIDEO < 12 HR: CPT | Mod: 52,ZF

## 2017-03-17 PROCEDURE — 25000132 ZZH RX MED GY IP 250 OP 250 PS 637: Mod: GY | Performed by: EMERGENCY MEDICINE

## 2017-03-17 PROCEDURE — 25500064 ZZH RX 255 OP 636: Performed by: PSYCHIATRY & NEUROLOGY

## 2017-03-17 PROCEDURE — 83036 HEMOGLOBIN GLYCOSYLATED A1C: CPT | Performed by: EMERGENCY MEDICINE

## 2017-03-17 PROCEDURE — 87640 STAPH A DNA AMP PROBE: CPT | Performed by: EMERGENCY MEDICINE

## 2017-03-17 PROCEDURE — 80048 BASIC METABOLIC PNL TOTAL CA: CPT | Performed by: EMERGENCY MEDICINE

## 2017-03-17 PROCEDURE — 81003 URINALYSIS AUTO W/O SCOPE: CPT | Performed by: EMERGENCY MEDICINE

## 2017-03-17 PROCEDURE — 99285 EMERGENCY DEPT VISIT HI MDM: CPT | Mod: 25 | Performed by: EMERGENCY MEDICINE

## 2017-03-17 PROCEDURE — 85610 PROTHROMBIN TIME: CPT | Performed by: EMERGENCY MEDICINE

## 2017-03-17 PROCEDURE — A9585 GADOBUTROL INJECTION: HCPCS | Performed by: PSYCHIATRY & NEUROLOGY

## 2017-03-17 RX ORDER — OXYBUTYNIN CHLORIDE 5 MG/1
5 TABLET ORAL AT BEDTIME
Status: DISCONTINUED | OUTPATIENT
Start: 2017-03-17 | End: 2017-03-18 | Stop reason: HOSPADM

## 2017-03-17 RX ORDER — LABETALOL HYDROCHLORIDE 5 MG/ML
10 INJECTION, SOLUTION INTRAVENOUS EVERY 10 MIN PRN
Status: DISCONTINUED | OUTPATIENT
Start: 2017-03-17 | End: 2017-03-18 | Stop reason: HOSPADM

## 2017-03-17 RX ORDER — CITALOPRAM HYDROBROMIDE 20 MG/1
20 TABLET ORAL DAILY
Status: DISCONTINUED | OUTPATIENT
Start: 2017-03-17 | End: 2017-03-18 | Stop reason: HOSPADM

## 2017-03-17 RX ORDER — ASPIRIN 300 MG/1
300 SUPPOSITORY RECTAL DAILY
Status: DISCONTINUED | OUTPATIENT
Start: 2017-03-18 | End: 2017-03-18 | Stop reason: HOSPADM

## 2017-03-17 RX ORDER — NALOXONE HYDROCHLORIDE 0.4 MG/ML
.1-.4 INJECTION, SOLUTION INTRAMUSCULAR; INTRAVENOUS; SUBCUTANEOUS
Status: DISCONTINUED | OUTPATIENT
Start: 2017-03-17 | End: 2017-03-18 | Stop reason: HOSPADM

## 2017-03-17 RX ORDER — SIMVASTATIN 40 MG
40 TABLET ORAL AT BEDTIME
Status: DISCONTINUED | OUTPATIENT
Start: 2017-03-17 | End: 2017-03-18 | Stop reason: HOSPADM

## 2017-03-17 RX ORDER — LORAZEPAM 0.5 MG/1
0.5 TABLET ORAL 2 TIMES DAILY PRN
Status: DISCONTINUED | OUTPATIENT
Start: 2017-03-17 | End: 2017-03-18 | Stop reason: HOSPADM

## 2017-03-17 RX ORDER — PANTOPRAZOLE SODIUM 40 MG/1
40 TABLET, DELAYED RELEASE ORAL DAILY
Status: DISCONTINUED | OUTPATIENT
Start: 2017-03-17 | End: 2017-03-18 | Stop reason: HOSPADM

## 2017-03-17 RX ORDER — DONEPEZIL HYDROCHLORIDE 10 MG/1
10 TABLET, FILM COATED ORAL AT BEDTIME
Status: DISCONTINUED | OUTPATIENT
Start: 2017-03-17 | End: 2017-03-18 | Stop reason: HOSPADM

## 2017-03-17 RX ORDER — GADOBUTROL 604.72 MG/ML
7.5 INJECTION INTRAVENOUS ONCE
Status: COMPLETED | OUTPATIENT
Start: 2017-03-17 | End: 2017-03-17

## 2017-03-17 RX ORDER — ASPIRIN 81 MG/1
324 TABLET, CHEWABLE ORAL ONCE
Status: COMPLETED | OUTPATIENT
Start: 2017-03-17 | End: 2017-03-17

## 2017-03-17 RX ADMIN — LORAZEPAM 0.5 MG: 0.5 TABLET ORAL at 07:21

## 2017-03-17 RX ADMIN — OXYBUTYNIN CHLORIDE 5 MG: 5 TABLET ORAL at 22:29

## 2017-03-17 RX ADMIN — PANTOPRAZOLE SODIUM 40 MG: 40 TABLET, DELAYED RELEASE ORAL at 18:05

## 2017-03-17 RX ADMIN — SIMVASTATIN 40 MG: 40 TABLET, FILM COATED ORAL at 22:29

## 2017-03-17 RX ADMIN — GADOBUTROL 7.5 ML: 604.72 INJECTION INTRAVENOUS at 08:03

## 2017-03-17 RX ADMIN — CITALOPRAM HYDROBROMIDE 20 MG: 20 TABLET ORAL at 14:09

## 2017-03-17 RX ADMIN — ASPIRIN 81 MG CHEWABLE TABLET 324 MG: 81 TABLET CHEWABLE at 05:35

## 2017-03-17 RX ADMIN — DONEPEZIL HYDROCHLORIDE 10 MG: 10 TABLET ORAL at 22:29

## 2017-03-17 RX ADMIN — HUMAN ALBUMIN MICROSPHERES AND PERFLUTREN 6 ML: 10; .22 INJECTION, SOLUTION INTRAVENOUS at 09:48

## 2017-03-17 ASSESSMENT — ACTIVITIES OF DAILY LIVING (ADL)
TRANSFERRING: 0-->INDEPENDENT
RETIRED_COMMUNICATION: 0-->UNDERSTANDS/COMMUNICATES WITHOUT DIFFICULTY
FALL_HISTORY_WITHIN_LAST_SIX_MONTHS: NO
RETIRED_EATING: 0-->INDEPENDENT
TOILETING: 0-->INDEPENDENT
COGNITION: 0 - NO COGNITION ISSUES REPORTED
BATHING: 0-->INDEPENDENT
AMBULATION: 0-->INDEPENDENT
SWALLOWING: 0-->SWALLOWS FOODS/LIQUIDS WITHOUT DIFFICULTY
DRESS: 0-->INDEPENDENT

## 2017-03-17 ASSESSMENT — VISUAL ACUITY: OU: NORMAL ACUITY

## 2017-03-17 NOTE — ED NOTES
Bed: IN01  Expected date:   Expected time:   Means of arrival:   Comments:  Mary Nilay [id number]   61F metastatic melanoma  Presented with sx concerning for likely TIA vs Sz, vs other- seen at [facility] ED.   Accepted here and to be admitted to [facility]- neuro [facility]

## 2017-03-17 NOTE — ED PROVIDER NOTES
Patient initially presented to the emergency department.  Slurred speech which improved the emergency department.  She was initially evaluated by Dr. Hughes and handed over to me when she left.  Neck and head CT angiogram shows no acute changes.  Discussed with Dr. Joyner at [facility] neurology department was agreed that patient be transferred to the emergency department on the [facility].  Diagnosis: Transient ischemic attack  Disposition: Transferred to [facility].     Francis Gannon MD  03/17/17 0115

## 2017-03-17 NOTE — IP AVS SNAPSHOT
"                  MRN:[id number]                      After Visit Summary   3/17/2017    Mary Pemberton    MRN: [id number]           Thank you!     Thank you for choosing [facility] for your care. Our goal is always to provide you with excellent care. Hearing back from our patients is one way we can continue to improve our services. Please take a few minutes to complete the written survey that you may receive in the mail after you visit with us. Thank you!        Patient Information     Date Of Birth          1955        About your hospital stay     You were admitted on:  March 17, 2017 You last received care in the:  [address] [facility]    You were discharged on:  March 18, 2017        Reason for your hospital stay       You have had multiple episodes of slurred/abnormal speech. We are not sure what caused this. We think it could be due to \"superficial siderosis,\" which is iron deposition & scarring on surface of the brain, which is a long-term effect of radiation. We did not find any evidence of stroke, seizure or other masses in the brain.                  Who to Call     For medical emergencies, please call 911.  For non-urgent questions about your medical care, please call your primary care provider or clinic, [phone]          Attending Provider     Provider Specialty    Ray Jaramillo MD Emergency Medicine    [place], Corwin Flowers MD Neurology       Primary Care Provider Office Phone # Fax #    Sam Tanner [phone]       [facility] [address] [address]        After Care Instructions     Activity       Your activity upon discharge: activity as tolerated            Diet       Follow this diet upon discharge: Orders Placed This Encounter      Combination Diet            Discharge Instructions       1) No changes in your medicines. Continue taking aspirin 81mg daily for stroke prevention.  2) Follow-up in Neurology clinic at [facility] in 3-4 months.      "             Follow-up Appointments     Adult [facility] Follow-up and recommended labs and tests       Follow up with primary care provider, Sam Tanner, within 7 days for post-hospital visit.    Follow-up in Neurology clinic in 3-4 months.    Appointments on [place] and/or [facility] (with [facility] or [facility] provider or service). Call [phone] if you haven't heard regarding these appointments within 7 days of discharge.                  Your next 10 appointments already scheduled     Mar 19, 2017  9:00 AM CDT   Stroke/[place]ia - [address] with Adrianne Lee, RN   [facility], [place] ([facility])    3rd Floor  [address]  [facility] [address]              Appointment is located at [address] [place] [address]              Additional Services     NEUROLOGY ADULT REFERRAL       Your provider has referred you to: [facility]: Neurology Clinic - [place] [phone]   [email or web address]  Stroke (Dr Quach) or General Neurology. Follow-up in 3-4 months.    Reason for Referral: Consult/ post-hospital follow-up    Please be aware that coverage of these services is subject to the terms and limitations of your health insurance plan.  Call member services at your health plan with any benefit or coverage questions.      Please bring the following with you to your appointment:    (1) Any X-Rays, CTs or MRIs which have been performed.  Contact the facility where they were done to arrange for  prior to your scheduled appointment.    (2) List of current medications  (3) This referral request   (4) Any documents/labs given to you for this referral                  Stroke Education     Please review the items below to help with stroke prevention.  Additional prevention suggestions are in the Understanding Stroke Handbook that you received.    Stroke risk factor  "changes that can help with stroke prevention:      Blood Pressure: Maintain your blood pressure within the goal the doctor sets with you.    If you are diabetic, work with your doctor to control your blood sugar and monitor your hemoglobin A1C (HbA1c).     Your doctor may recommend a statin medication to help lower your cholesterol level.    If you have an irregular heartbeat, speak to your doctor about possible treatments.    Maintain a healthy weight.    Eat a healthy diet. We suggest a Mediterranean or heart-healthy diet, but discuss what's best for you with your doctor.    Limit alcohol consumption.    If you smoke - QUIT.  We encourage you to get support. Start by talking with your doctor. Another option is to call the Quit Plan Program at [phone].    Stroke  Warning Signs:     It s important to know the warning signs of stroke. Call 911 if you experience one or more warning signs like these:      Sudden numbness or weakness of the face, arm or leg, especially on one side of the body    Sudden confusion, trouble speaking or understanding    Sudden trouble seeing in one or both eyes    Sudden trouble walking, dizziness, loss of balance or coordination    Sudden severe headache with no known cause          Pending Results     Date and Time Order Name Status Description    3/16/2017 1848 EKG CARDIAC - HIM SCAN Preliminary             Statement of Approval     Ordered          03/18/17 1224  I have reviewed and agree with all the recommendations and orders detailed in this document.  EFFECTIVE NOW     Approved and electronically signed by:  Corwin Martinez MD             Admission Information     Date & Time Provider Department Dept. Phone    3/17/2017 Corwin Martinez MD [address] [facility] [place] [phone]      Your Vitals Were     Blood Pressure Pulse Temperature Respirations Height Weight    98/55 (BP Location: Left arm) 50 98.1  F (36.7  C) (Oral) 16 1.626 m (5' 4\") 77.1 kg (170 lb)    " "Pulse Oximetry BMI (Body Mass Index)                99% 29.18 kg/m2          [employer] Information     [employer] lets you send messages to your doctor, view your test results, renew your prescriptions, schedule appointments and more. To sign up, go to [email or web address] . Click on \"Log in\" on the left side of the screen, which will take you to the Welcome page. Then click on \"Sign up Now\" on the right side of the page.     You will be asked to enter the access code listed below, as well as some personal information. Please follow the directions to create your username and password.     Your access code is: [email or web address]  Expires: 2017 11:13 AM     Your access code will  in 90 days. If you need help or a new code, please call your [facility] clinic or [phone].        Care EveryWhere ID     This is your Care EveryWhere ID. This could be used by other organizations to access your [facility] medical records  [id number]           Review of your medicines      CONTINUE these medicines which have NOT CHANGED        Dose / Directions    ASPIRIN ADULT LOW STRENGTH 81 MG chewable tablet   Used for:  CVA (cerebral infarction)   Generic drug:  aspirin        Dose:  162 mg   Take 162 mg by mouth daily   Quantity:  36 tablet   Refills:  0       citalopram 20 MG tablet   Commonly known as:  celeXA   Used for:  Depression        Dose:  20 mg   Take 1 tablet (20 mg) by mouth daily   Quantity:  90 tablet   Refills:  0       cyanocolbalamin 100 MCG tablet   Commonly known as:  vitamin  B-12        Dose:  50 mcg   Take 50 mcg by mouth daily.   Refills:  0       donepezil 10 MG tablet   Commonly known as:  ARICEPT   Used for:  Senile dementia        TAKE ONE TABLET BY MOUTH AT BEDTIME   Quantity:  90 tablet   Refills:  1       LORazepam 0.5 MG tablet   Commonly known as:  ATIVAN   Used for:  Anxiety        TAKE ONE TABLET BY MOUTH TWICE DAILY AS NEEDED FOR ANXIETY   Quantity:  60 tablet   Refills:  0       mometasone 0.1 % " cream   Commonly known as:  ELOCON   Used for:  Intrinsic eczema        Apply sparingly to affected area twice daily as needed.  Do not apply to face.   Quantity:  15 g   Refills:  0       OMEGA-3 FISH OIL PO        Dose:  1 capsule   Take 1 capsule by mouth daily   Refills:  0       pantoprazole 40 MG EC tablet   Commonly known as:  PROTONIX   Used for:  Esophageal reflux        TAKE ONE TABLET BY MOUTH ONCE DAILY   Quantity:  90 tablet   Refills:  2       simvastatin 40 MG tablet   Commonly known as:  ZOCOR   Used for:  Hyperlipidemia LDL goal <160        TAKE ONE TABLET BY MOUTH AT BEDTIME   Quantity:  90 tablet   Refills:  0       zolpidem 10 MG tablet   Commonly known as:  AMBIEN   Used for:  Persistent insomnia        TAKE ONE TABLET BY MOUTH AT BEDTIME AS NEEDED FOR SLEEP   Quantity:  30 tablet   Refills:  0                Protect others around you: Learn how to safely use, store and throw away your medicines at www.disposemymeds.org.             Medication List: This is a list of all your medications and when to take them. Check marks below indicate your daily home schedule. Keep this list as a reference.      Medications           Morning Afternoon Evening Bedtime As Needed    ASPIRIN ADULT LOW STRENGTH 81 MG chewable tablet   Take 162 mg by mouth daily   Last time this was given:  324 mg on 3/17/2017  5:35 AM   Generic drug:  aspirin                                citalopram 20 MG tablet   Commonly known as:  celeXA   Take 1 tablet (20 mg) by mouth daily   Last time this was given:  20 mg on 3/18/2017  8:38 AM                                cyanocolbalamin 100 MCG tablet   Commonly known as:  vitamin  B-12   Take 50 mcg by mouth daily.                                donepezil 10 MG tablet   Commonly known as:  ARICEPT   TAKE ONE TABLET BY MOUTH AT BEDTIME   Last time this was given:  10 mg on 3/17/2017 10:29 PM                                LORazepam 0.5 MG tablet   Commonly known as:  ATIVAN   TAKE ONE  TABLET BY MOUTH TWICE DAILY AS NEEDED FOR ANXIETY   Last time this was given:  0.5 mg on 3/17/2017  7:21 AM                                mometasone 0.1 % cream   Commonly known as:  ELOCON   Apply sparingly to affected area twice daily as needed.  Do not apply to face.                                OMEGA-3 FISH OIL PO   Take 1 capsule by mouth daily                                pantoprazole 40 MG EC tablet   Commonly known as:  PROTONIX   TAKE ONE TABLET BY MOUTH ONCE DAILY   Last time this was given:  40 mg on 3/18/2017  8:38 AM                                simvastatin 40 MG tablet   Commonly known as:  ZOCOR   TAKE ONE TABLET BY MOUTH AT BEDTIME   Last time this was given:  40 mg on 3/17/2017 10:29 PM                                zolpidem 10 MG tablet   Commonly known as:  AMBIEN   TAKE ONE TABLET BY MOUTH AT BEDTIME AS NEEDED FOR SLEEP

## 2017-03-17 NOTE — ED NOTES
Provider talked with Allan BOWEN and stated pt not a canidate for thrombolytics with history of metastatic melanoma to brain.

## 2017-03-17 NOTE — ED PROVIDER NOTES
History     Chief Complaint   Patient presents with     Slurred Speech     HPI  Mary Pemberton is a 61 year old female who presents as a transfer from [facility] with a TIA.  The patient had slurred speech beginning at 5pm.  She states her symptoms resolved prior to arriving at the [facility] ER.  She has no weakness or numbness.  No vision changes.  She has had similar episodes to this in the past and had received tPA with complete resolution of symptoms.  She takes aspirin daily.      PAST MEDICAL HISTORY:   Past Medical History   Diagnosis Date     Atrial fibrillation (H)      with Bradycardia     CAD (coronary atherosclerotic disease) 11/14/2012     CVA (cerebral infarction) 04/28/2015     with TPA     Dementia 10/19/2012     Depressive disorder, not elsewhere classified 4/22/2008     GERD (gastroesophageal reflux disease) 11/14/2012     H/O: stroke 4/28/2015     Localized superficial swelling, mass, or lump 9/9/2004     Lump or mass in breast 1/3/2000     Malignant melanoma of skin of upper limb, including shoulder (H) 9/4/2001     Melanoma of skin (H) 4/16/2002      Problem list name updated by automated process. Provider to review     Melanoma of skin, site unspecified 4/16/2002     Other and unspecified hyperlipidemia 9/12/2002     Other specified episodic mood disorder 8/16/2002     Pre-diabetes 9/13/2011     Rash and other nonspecific skin eruption 11/7/2005       PAST SURGICAL HISTORY:   Past Surgical History   Procedure Laterality Date     Brain bx       Arm       LT     Open heart       Colonoscopy  [id number]       FAMILY HISTORY:   Family History   Problem Relation Age of Onset     CANCER Father      lung     CANCER Maternal Grandfather      Hypertension Mother        SOCIAL HISTORY:   Social History   Substance Use Topics     Smoking status: Former Smoker     Packs/day: 1.00     Years: 20.00     Types: Cigarettes     Quit date: 6/6/2002     Smokeless tobacco: Never Used      Comment: year quit 2001; no  "passive exposure.     Alcohol use No       Patient's Medications   New Prescriptions    No medications on file   Previous Medications    ASPIRIN (ASPIRIN ADULT LOW STRENGTH) 81 MG CHEWABLE TABLET    Take 162 mg by mouth daily     CITALOPRAM (CELEXA) 20 MG TABLET    Take 1 tablet (20 mg) by mouth daily    CYANOCOLBALAMIN (VITAMIN  B-12) 100 MCG TABLET    Take 50 mcg by mouth daily.    DONEPEZIL (ARICEPT) 10 MG TABLET    TAKE ONE TABLET BY MOUTH AT BEDTIME    LORAZEPAM (ATIVAN) 0.5 MG TABLET    TAKE ONE TABLET BY MOUTH TWICE DAILY AS NEEDED FOR ANXIETY    MOMETASONE (ELOCON) 0.1 % CREAM    Apply sparingly to affected area twice daily as needed.  Do not apply to face.    OMEGA-3 FATTY ACIDS (OMEGA-3 FISH OIL PO)        OXYBUTYNIN CHLORIDE PO    Take 5 mg by mouth At Bedtime    PANTOPRAZOLE (PROTONIX) 40 MG EC TABLET    TAKE ONE TABLET BY MOUTH ONCE DAILY    SIMVASTATIN (ZOCOR) 40 MG TABLET    TAKE ONE TABLET BY MOUTH AT BEDTIME    ZOLPIDEM (AMBIEN) 10 MG TABLET    TAKE ONE TABLET BY MOUTH AT BEDTIME AS NEEDED FOR SLEEP   Modified Medications    No medications on file   Discontinued Medications    No medications on file          Allergies   Allergen Reactions     Codeine Nausea and Vomiting     Codeine Phosphate      Quinolones      Pt intolerance to steroids also         I have reviewed the Medications, Allergies, Past Medical and Surgical History, and Social History in the Epic system.    Review of Systems   10 pt ROS completed and negative except as noted above in HPI      Physical Exam   BP: 134/85  Pulse: (!) 48  Temp: 97.8  F (36.6  C)  Resp: 16  Height: 162.6 cm (5' 4\")  Weight: 77.1 kg (170 lb)  SpO2: 100 %  Physical Exam   Constitutional: She is oriented to person, place, and time. No distress.   HENT:   Head: Normocephalic and atraumatic.   Mouth/Throat: No oropharyngeal exudate.   Eyes: Conjunctivae are normal. Pupils are equal, round, and reactive to light.   Neck: Normal range of motion. Neck supple. "   Cardiovascular: Normal rate and intact distal pulses.    Pulmonary/Chest: Effort normal. No respiratory distress. She has no wheezes. She has no rales.   Abdominal: She exhibits no distension. There is no tenderness. There is no rebound and no guarding.   Musculoskeletal: Normal range of motion.   Neurological: She is alert and oriented to person, place, and time. She displays normal reflexes. No cranial nerve deficit. She exhibits normal muscle tone. Coordination normal.   No dysarthria or aphasia   Skin: Skin is warm and dry. She is not diaphoretic.   Psychiatric: She has a normal mood and affect. Her behavior is normal. Thought content normal.   Nursing note and vitals reviewed.      ED Course     ED Course     Procedures             Critical Care time:  none     The patient has stroke symptoms:           ED Stroke specific documentation           NIHSS PDF          Protocol PDF     Patient last known well time: 5pm  ED Provider first to bedside at: 342  CT Results received at: transfer from [facility]  Patient was not treated with TPA due to the following reason(s):  Rapidly improving symptoms    National Institutes of Health Stroke Scale (Baseline)  Time Performed: 345      Score    Level of consciousness: (0)   Alert, keenly responsive    LOC questions: (0)   Answers both questions correctly    LOC commands: (0)   Performs both tasks correctly    Best gaze: (0)   Normal    Visual: (0)   No visual loss    Facial palsy: (0)   Normal symmetrical movements    Motor arm (left): (0)   No drift    Motor arm (right): (0)   No drift    Motor leg (left): (0)   No drift    Motor leg (right): (0)   No drift    Limb ataxia: (0)   Absent    Sensory: (0)   Normal- no sensory loss    Best language: (0)   Normal- no aphasia    Dysarthria: (0)   Normal    Extinction and inattention: (0)   No abnormality        Total Score:  0        Stroke Mimics were considered (including migraine headache, seizure disorder, hypoglycemia (or  hyperglycemia), head or spinal trauma, CNS infection, Toxin ingestion and shock state (e.g. sepsis) .               Labs Ordered and Resulted from Time of ED Arrival Up to the Time of Departure from the ED - No data to display    Assessments & Plan (with Medical Decision Making)   1.  TIA       62 yo F who was transferred from [facility] with concern for a TIA.  She had dysarthria that resolved prior to arriving at the [facility] ER.  CT head was normal.  On arrival her neuro exam was normal and she had no complaints.  I discussed the case with neurology who will admit the patient.  Aspirin given.  Continue neuro checks while in the ER.    I have reviewed the nursing notes.    I have reviewed the findings, diagnosis, plan and need for follow up with the patient.    New Prescriptions    No medications on file       Final diagnoses:   TIA (transient ischemic attack)       3/17/2017   [facility], [place], EMERGENCY DEPARTMENT     Ray Jaramillo MD  03/17/17 0615       Ray Jaramillo MD  03/17/17 0794

## 2017-03-17 NOTE — ED NOTES
CT scan results discussed with Dr. Martinez at [facility].  Patient will be transferred to [facility] on the [facility].  Discharge diagnoses: Transient ischemic attack.  Transferred to [facility]: BLS ambulance.     Francis Gannon MD  03/16/17 4927

## 2017-03-17 NOTE — PLAN OF CARE
Problem: Goal Outcome Summary  Goal: Goal Outcome Summary  Outcome: No Change  Pt arrived from ED around 1500, after experiencing slurred speech late last evening. Since arriving to the ED, symptoms have resolved. VSS. Denies pain. A&O x4. Other neuros intact, except forgetful. Currently being hooked up to EEG. PIV SL. Good PO intake on reg diet. Remains on continuous cardiac monitoring in sinus alexander. Voiding spontaneously. +BS; no BM. Up with SBA. Family at bedside and supportive in cares. Will continue to monitor.

## 2017-03-17 NOTE — MR AVS SNAPSHOT
After Visit Summary   3/17/2017    Mary Pemberton    MRN: [id number]           Patient Information     Date Of Birth          1955        Visit Information        Provider Department      3/17/2017 12:00 PM [facility] EEG TECH 4 [facility] EEG        Today's Diagnoses     Seizures (H)    -  1       Follow-ups after your visit        Your next 10 appointments already scheduled     Mar 19, 2017  9:00 AM CDT   Stroke/Tia - [address] [facility] [address] with Adrianne Lee RN   [facility], [place], Patient Learning Center ([employer], [facility])    3rd Floor  [address]  [facility] [address]              Appointment is located at [address] [place] [address]              Future tests that were ordered for you today     Open Standing Orders        Priority Remaining Interval Expires Ordered    Daily weights STAT 1/1 DAILY  3/17/2017    ABO/Rh type and screen Routine 1/1 AM DRAW  3/17/2017    Lipid panel: Fasting Routine 1/1 AM DRAW  3/17/2017    Platelet count Timed 5/6 EVERY 72 HOURS  3/17/2017            Who to contact     Please call your clinic at [phone] to:    Ask questions about your health    Make or cancel appointments    Discuss your medicines    Learn about your test results    Speak to your doctor   If you have compliments or concerns about an experience at your clinic, or if you wish to file a complaint, please contact [facility] Patient Relations at [phone] or email us at [email or web address]         Additional Information About Your Visit        MyChart Information     Brain [employer]t is an electronic gateway that provides easy, online access to your medical records. With [employer], you can request a clinic appointment, read your test results, renew a prescription or communicate with your care team.     To sign up for [employer]t visit the website at [email or web address]   You will be asked to  enter the access code listed below, as well as some personal information. Please follow the directions to create your username and password.     Your access code is: [email or web address]  Expires: 2017 11:13 AM     Your access code will  in 90 days. If you need help or a new code, please contact your [facility] or call [phone] for assistance.        Care EveryWhere ID     This is your Care EveryWhere ID. This could be used by other organizations to access your [facility] medical records  [id number]         Blood Pressure from Last 3 Encounters:   17 106/51   17 120/83   17 122/82    Weight from Last 3 Encounters:   17 77.1 kg (170 lb)   17 78 kg (172 lb)   11/15/16 80.7 kg (178 lb)              Today, you had the following     No orders found for display         Today's Medication Changes      Notice     This visit is during an admission. Changes to the med list made in this visit will be reflected in the After Visit Summary of the admission.             Primary Care Provider Office Phone # Fax #    Sam Tanner [phone]       [address] [address]        Thank you!     Thank you for choosing [facility]  for your care. Our goal is always to provide you with excellent care. Hearing back from our patients is one way we can continue to improve our services. Please take a few minutes to complete the written survey that you may receive in the mail after your visit with us. Thank you!             Your Updated Medication List - Protect others around you: Learn how to safely use, store and throw away your medicines at www.disposemymeds.org.      Notice     This visit is during an admission. Changes to the med list made in this visit will be reflected in the After Visit Summary of the admission.

## 2017-03-17 NOTE — DISCHARGE SUMMARY
"[facility], [place]    Neurology Stroke Discharge Summary    Date of Admission: 3/17/2017  Date of Discharge: 03/18/2017    Disposition: Discharged to home  Primary Care Physician: Sam Tanner      Admission Diagnosis:   Episode of slurred speech    Discharge Diagnosis:   Episode of slurred speech - unclear etiology    Problem Leading to Hospitalization (from HPI):   \"Mary Pemberton is a 61 year old female with a history of 3 previous strokes, CAD, depression, dementia, and history of stage IV melanoma with at least one previously known met to the brain who presents as a transfer from [place], [place] due to suspected TIA. She is back to baseline at initial encounter. She states the following:     - In her normal state of health today  - Around 5pm, had onset of slurred speech. She describes this as the exact same presentation that she experienced with her previous 3 strokes.  - She was then driven to the ED by her  though they symptoms resolved. She estimates this was in total about 15-25 minutes  - In the ED, she said she felt fine with no further complaints.  - Per available notes, CT/CTA were performed at outside hospital and were unremarkable.   - Her case was then discussed with staff here and plans made to transfer to [facility]     Patient denies any recent fevers or other illnesses. No nausea, vomiting, constipation, or diarrhea. She denies any shaking, tongue biting, bowel or bladder incontinence. No other changes. \"    Please see H&P dated 3/17/2017 for further details about presentation.    Brief Hospital Course (by problem list):     # Episode of slurred speech, lasting for ~15-20 minutes. Has had 3 prior spells with exact same symptom in the past. Possibly \"amyoid spells\" as MRI showed superficial siderosis, which is likely due to history of brain radiation. No stroke, brain mets or other lesions seen on MRI. Simple partial seizures also possible, though patient underwent " continuous video EEG monitoring overnight which showed no seizures or epileptiform discharges. Lower suspicion for TIA given stereotypic nature of these spells would be unusual for this. Patient was recommended to continue home aspirin & simvastatin for primary stroke prevention. Decision was made not to start empiric antiepileptic. If patient has recurrent spells, then can consider empiric treatment with antiepileptic.     # History of paroxysmal atrial fibrillation. Was in sinus rhythm during this hospitalization. Patient found to have superficial siderosis / cortical microbleeds on susceptibility-weighted images of MRI. Due to bleeding risk associated with this, recommend avoiding anticoagulation. Patient should continue home aspirin for primary stroke prevention.    Stroke Information:   IV tPA was not given due to quickly resolving symptoms & stroke mimic (possible amyloid spells).    Work-up as stated below under Pertinent Investigations.   Rehab evaluation: OT and PT.   Smoking Cessation: patient is not a smoker  BP Long-term Goal: 140/90 or less  Antithrombotic/Anticoagulant Agent: continue home aspirin 81 mg   Statins: continue home simvastatin 40mg    Hgb A1C Goal: < 7.0  Complications: None.       ITEMS TO FOLLOW-UP AFTER DISCHARGE  1. Follow-up in Stroke or General Neurology Clinic.   2. If spells recur, then consider empiric treatment with antiepileptics.    PERTINENT INVESTIGATIONS    Labs  Lipid Panel:   Recent Labs   Lab Test  03/18/17   0825   07/30/15   0954   CHOL  204*   < >  209*   HDL  44*   < >  45*   LDL  98   < >  110   TRIG  306*   < >  268*   CHOLHDLRATIO   --    --   4.6    < > = values in this interval not displayed.     A1C:   Lab Results   Component Value Date    A1C 5.8 03/17/2017     INR:   Recent Labs  Lab 03/17/17  0706 03/16/17  1846   INR 1.03 1.02      Coag Panel / Hypercoag Workup: Not indicated  Pending test results: n/a    Echo: EF 55-60%, intact atrial septum  MRI  "Brain:  \"Impression:  1. No acute intracranial pathology. Specifically no acute infarct.  2. Increased moderate chronic small vessel ischemic disease.  3. Multiple scattered susceptibility foci throughout the cerebral and  cerebellar hemispheres and focal hemosiderin deposition in the left  parietal lobe. These are likely sequela of intracranial melanoma  treatment. Stable left frontal craniotomy.\"     Endovascular procedure: None   Cardiac Monitoring: Patient had > 24 hrs of cardiac monitor while in hospital.    Findings: the patient has history of paroxysmal atrial fibrillation. Was in normal sinus rhythm while in the hospital.    Sleep Apnea Screen:   Did not screen patient since this was not an ischemic stroke    Stroke Education was provided including: stroke warning signs, EMS activation, medication changes, follow-up instructions/plan, and risk factor management plan.      PHYSICAL EXAMINATION  Vital Signs:  B/P: 98/55, T: 98.1, P: 50, R: 16    General:  patient lying in bed without any acute distress     HEENT:  normocephalic/atraumatic  Cardio:  RRR  Pulmonary:  no respiratory distress  Abdomen:  soft  Extremities:  no edema  Skin:  intact     Neurologic  Mental Status:  fully alert, attentive and oriented, follows commands, speech clear and fluent  Cranial Nerves:  visual fields intact, PERRL, EOMI with normal smooth pursuit, facial movements symmetric, no dysarthria, shoulder shrug strong bilaterally, tongue protrusion midline  Motor:  normal tone throughout, no pronator drift, strength 5/5 throughout upper and lower extremities  Reflexes:  2+ and symmetric throughout  Sensory: intact to light touch throughout  Coordination:  FNF without dysmetria bilaterally  Station/Gait:  Did not assess    National Institutes of Health Stroke Scale (on day of discharge)  NIHSS Total Score: 0    Modified Marshall Scale (on day of discharge): 0-No symptoms    Medications    Discharge Medication List as of 3/18/2017  1:21 " "PM      CONTINUE these medications which have NOT CHANGED    Details   zolpidem (AMBIEN) 10 MG tablet TAKE ONE TABLET BY MOUTH AT BEDTIME AS NEEDED FOR SLEEP, Disp-30 tablet, R-0, Local Print      simvastatin (ZOCOR) 40 MG tablet TAKE ONE TABLET BY MOUTH AT BEDTIME, Disp-90 tablet, R-0, E-Prescribe      citalopram (CELEXA) 20 MG tablet Take 1 tablet (20 mg) by mouth daily, Disp-90 tablet, R-0, E-Prescribe      pantoprazole (PROTONIX) 40 MG EC tablet TAKE ONE TABLET BY MOUTH ONCE DAILY, Disp-90 tablet, R-2, E-Prescribe      donepezil (ARICEPT) 10 MG tablet TAKE ONE TABLET BY MOUTH AT BEDTIME, Disp-90 tablet, R-1, E-Prescribe      Omega-3 Fatty Acids (OMEGA-3 FISH OIL PO) Take 1 capsule by mouth daily , Historical      aspirin (ASPIRIN ADULT LOW STRENGTH) 81 MG chewable tablet Take 162 mg by mouth daily , Disp-36 tablet, Historical      cyanocolbalamin (VITAMIN  B-12) 100 MCG tablet Take 50 mcg by mouth daily., Historical      mometasone (ELOCON) 0.1 % cream Apply sparingly to affected area twice daily as needed.  Do not apply to face.Disp-15 g, [id number]      LORazepam (ATIVAN) 0.5 MG tablet TAKE ONE TABLET BY MOUTH TWICE DAILY AS NEEDED FOR ANXIETY, Disp-60 tablet, R-0, Local Print         STOP taking these medications       OXYBUTYNIN CHLORIDE PO Comments:   Reason for Stopping:               Additional recommendations and follow up:      NEUROLOGY ADULT REFERRAL     Reason for your hospital stay   You have had multiple episodes of slurred/abnormal speech. We are not sure what caused this. We think it could be due to \"superficial siderosis,\" which is iron deposition & scarring on surface of the brain, which is a long-term effect of radiation. We did not find any evidence of stroke, seizure or other masses in the brain.     Adult [facility] Follow-up and recommended labs and tests   Follow up with primary care provider, Sam Tanner, within 7 days for post-hospital visit.    Follow-up in Neurology clinic in " 3-4 months.    Appointments on [employer] and/or [facility] (with [facility] or [facility] provider or service). Call [phone] if you haven't heard regarding these appointments within 7 days of discharge.     Activity   Your activity upon discharge: activity as tolerated     Discharge Instructions   1) No changes in your medicines. Continue taking aspirin 81mg daily for stroke prevention.  2) Follow-up in Neurology clinic at [facility] in 3-4 months.     Full Code     Diet   Follow this diet upon discharge: Orders Placed This Encounter     Combination Diet         Patient was seen and discussed with the Attending, Dr. Martinez.    Mabel Eaton  G2 Neurology

## 2017-03-17 NOTE — IP AVS SNAPSHOT
[address] [address]    Phone:  [phone]                                       After Visit Summary   3/17/2017    Mary Pemberton    MRN: [id number]           After Visit Summary Signature Page     I have received my discharge instructions, and my questions have been answered. I have discussed any challenges I see with this plan with the nurse or doctor.    ..........................................................................................................................................  Patient/Patient Representative Signature      ..........................................................................................................................................  Patient Representative Print Name and Relationship to Patient    ..................................................               ................................................  Date                                            Time    ..........................................................................................................................................  Reviewed by Signature/Title    ...................................................              ..............................................  Date                                                            Time

## 2017-03-17 NOTE — H&P
[facility], [place]      Neurology Stroke Admission    Patient Name: Mary Pemberton  : 1955 MRN#: [id number]    STROKE DATA     Stroke Code:  Stroke code not indicated.  Time patient seen:  2017 0430  Last known normal (pt's baseline):  3/16/17 1700    TPA treatment:  Not given due to outside the time window, minor / isolated / quickly resolving stroke symptoms.     National Institutes of Health Stroke Scale (at presentation)  NIHSS done at:  time patient seen      Score   Level of consciousness: (0)   Alert, keenly responsive   LOC questions: (0)   Answers both questions correctly   LOC commands: (0)   Performs both tasks correctly   Best gaze: (0)   Normal   Visual: (0)   No visual loss   Facial palsy: (0)   Normal symmetrical movements   Motor arm (left): (0)   No drift   Motor arm (right): (0)   No drift   Motor leg (left): (0)   No drift   Motor leg (right): (0)   No drift   Limb ataxia: (0)   Absent   Sensory: (0)   Normal- no sensory loss   Best language: (0)   Normal- no aphasia   Dysarthria: (0)   Normal   Extinction and inattention: (0)   No abnormality       NIHSS Total Score: 0        Dysphagia Screen  Time of screenin2017 0450  Screening results: Passed screening, no dysarthria - Regular Diet with thin liquids     ASSESSMENT & PLAN BY PROBLEM     Work-up for Transient Ischemic Attack  61 year old woman with several risk factors being transferred for expedited workup of a likely TIA. Had onset of slurred speech lasting 25 minutes; slurred speech is the presentation of her previous strokes. CT/CTA were unremarkable. Does have history of stage IV melanoma so will pursue MRI with contrast to evaluate when looking for possible ischemic change. Will also consider EEG given the possibility of seizures in this patient.     Transient Ischemic Attack Plan  - ABCD2 Score: 3  - Admit to Neurology  - Neurochecks  - Euthermia, Euglycemia  - Daily aspirin for secondary  stroke prevention  - Statin  - MRI/MRA Stroke Protocol  - TTE with Bubble Study  - 24-hour Telemetry  - Bedside Glucose Monitoring  - A1c, Lipid Panel  - Rehab (PT/OT/SLP) services NOT required due to lack of ongoing deficit  - Stroke Education  - Depression Screen  - Apnea Screen     Patient was admitted via transfer from [place], [place].     The patient will be admitted to the Neuro Critical Care/Stroke team..     Other Medical Problems:  None     Fluids/Electrolytes/Nutrition  0.9% sodium chloride @ 0 mL/hr, patient able to eat and drink  Avoid hypotonic fluids.    Nutrition: None      Prophylaxis            For VTE Prevention:  - pneumatic compression device    For Acid Suppression:  - GI prophylaxis is not indicated    Code Status  Full Code    HPI  Mary Pemberton is a 61 year old female with a history of 3 previous strokes, CAD, depression, dementia, and history of stage IV melanoma with at least one previously known met to the brain who presents as a transfer from [place], [place] due to suspected TIA. She is back to baseline at initial encounter. She states the following:    - In her normal state of health today  - Around 5pm, had onset of slurred speech. She describes this as the exact same presentation that she experienced with her previous 3 strokes.  - She was then driven to the ED by her  though they symptoms resolved. She estimates this was in total about 15-25 minutes  - In the ED, she said she felt fine with no further complaints.  - Per available notes, CT/CTA were performed at outside hospital and were unremarkable.   - Her case was then discussed with staff here and plans made to transfer to [facility]    Patient denies any recent fevers or other illnesses. No nausea, vomiting, constipation, or diarrhea. She denies any shaking, tongue biting, bowel or bladder incontinence. No other changes.       Pertinent Past Medical/Surgical History  Past Medical History   Diagnosis Date     Atrial fibrillation (H)       with Bradycardia     CAD (coronary atherosclerotic disease) 11/14/2012     CVA (cerebral infarction) 04/28/2015     with TPA     Dementia 10/19/2012     Depressive disorder, not elsewhere classified 4/22/2008     GERD (gastroesophageal reflux disease) 11/14/2012     H/O: stroke 4/28/2015     Localized superficial swelling, mass, or lump 9/9/2004     Lump or mass in breast 1/3/2000     Malignant melanoma of skin of upper limb, including shoulder (H) 9/4/2001     Melanoma of skin (H) 4/16/2002      Problem list name updated by automated process. Provider to review     Melanoma of skin, site unspecified 4/16/2002     Other and unspecified hyperlipidemia 9/12/2002     Other specified episodic mood disorder 8/16/2002     Pre-diabetes 9/13/2011     Rash and other nonspecific skin eruption 11/7/2005       Past Surgical History   Procedure Laterality Date     Brain bx       Arm       LT     Open heart       Colonoscopy  [id number]       Medications:   Current Facility-Administered Medications   Medication     aspirin chewable tablet 324 mg     Current Outpatient Prescriptions   Medication Sig     zolpidem (AMBIEN) 10 MG tablet TAKE ONE TABLET BY MOUTH AT BEDTIME AS NEEDED FOR SLEEP     mometasone (ELOCON) 0.1 % cream Apply sparingly to affected area twice daily as needed.  Do not apply to face.     simvastatin (ZOCOR) 40 MG tablet TAKE ONE TABLET BY MOUTH AT BEDTIME     citalopram (CELEXA) 20 MG tablet Take 1 tablet (20 mg) by mouth daily     pantoprazole (PROTONIX) 40 MG EC tablet TAKE ONE TABLET BY MOUTH ONCE DAILY     LORazepam (ATIVAN) 0.5 MG tablet TAKE ONE TABLET BY MOUTH TWICE DAILY AS NEEDED FOR ANXIETY     donepezil (ARICEPT) 10 MG tablet TAKE ONE TABLET BY MOUTH AT BEDTIME     OXYBUTYNIN CHLORIDE PO Take 5 mg by mouth At Bedtime     Omega-3 Fatty Acids (OMEGA-3 FISH OIL PO)      aspirin (ASPIRIN ADULT LOW STRENGTH) 81 MG chewable tablet Take 162 mg by mouth daily      cyanocolbalamin (VITAMIN  B-12) 100 MCG tablet  Take 50 mcg by mouth daily.   .    Allergies:   Allergies   Allergen Reactions     Codeine Nausea and Vomiting     Codeine Phosphate      Quinolones      Pt intolerance to steroids also   .    Family History:   Family History   Problem Relation Age of Onset     CANCER Father      lung     CANCER Maternal Grandfather      Hypertension Mother    .    Social History:   Social History   Substance Use Topics     Smoking status: Former Smoker     Packs/day: 1.00     Years: 20.00     Types: Cigarettes     Quit date: 6/6/2002     Smokeless tobacco: Never Used      Comment: year quit 2001; no passive exposure.     Alcohol use No   .    Tobacco use: Former smoker, last smoked years ago    ROS:  The 10 point Review of Systems is negative other than noted in the HPI or here.     PHYSICAL EXAMINATION  Vital Signs:  B/P: 134/85,  T: 97.8,  P: 48,  R: 16    General:  patient lying in bed without any acute distress    HEENT:  normocephalic/atraumatic  Cardio:  RRR  Pulmonary:  no respiratory distress  Abdomen:  soft, non-tender, bowel sounds present  Extremities:  peripheral pulses palpable  Skin:  intact, warm/dry     Neurologic  Mental Status:  fully alert, attentive and oriented, follows commands, speech clear and fluent  Cranial Nerves:  visual fields intact, PERRL, EOMI with normal smooth pursuit, facial sensation intact and symmetric, facial movements symmetric, hearing not formally tested but intact to conversation, palate elevation symmetric and uvula midline, no dysarthria, shoulder shrug strong bilaterally, tongue protrusion midline  Motor:  no abnormal movements, normal tone throughout, no pronator drift, able to move all limbs spontaneously, strength 5/5 throughout upper and lower extremities  Reflexes:  2+ and symmetric throughout, no clonus, toes downgoing  Sensory:  intact/symmetric to light touch and pin prick throughout upper and lower extremities  Coordination:  FNF and HS intact without dysmetria  Station/Gait:   "deferred    Labs  Labs and Imaging reviewed and used in developing the plan; pertinent results included.     Lab Results   Component Value Date     (H) 03/16/2017       The patient was discussed with the fellow, Dr. Celio North.  The staff is Dr. Corwin Martinez.    Daniel Villanueva  Pager: [phone]      Addendum:     Ms. Mary Pemberton is a 61 year old woman with with a complex past medical history. She was first diagnosed with malignant melanoma of the left shoulder in August 2000 and has had several recurrences over the next several years managed by surgical excisions. However, in 6/2005 she was found to have a metastatic lesion in her right atrium which was excised, but subsequent CT imaging in 9/2005 showed multiple metastatic lesions throughout her body, including enhancing brain metastases found on MR after she developed confusion in 10/2005. She underwent whole-brain radiation in [place] and received systemic treatment with IL-2 in 11/2005, completing 9 of 14 cycles. In 2/2009 routine follow up MRI identified new left frontal skull lesions with no evidence of metastatic disease on bone scan. Excision of the left frontal skull tumor and cranioplasty was completed in 4/2009 and pathology found no signs of recurrent tumor.     In 4/2015 the patient was hospitalized in [place] for expressive aphasia and \"some weakness.\" She received tPA with complete resolution of her symptoms but was noted to have an episode of atrial fibrillation and asymptomatic junctional bradycardia. No records available from that visit, although subsequent records report she was diagnosed with left-sided stroke. She was hospitalized again 4 days later for another 25-30 minute episode of aphasia which resolved prior to receiving tPA. MRI from that visit showed chronic changes but no acute stroke. She was started on Rivaroxaban and Aspirin at that time, but discontinued the Rivaroxaban due to nose bleeds and aspirin was increased to 162 " mg daily.    Of note, she was evaluated by the memory clinic at [facility] in 2012 for senile dementia, likely due to her previous radiation treatments.    Since the resolution of her symptoms she has remained asymptomatic and denied visual changes, changes in speech, facial droop, weakness, numbness, chest pain, shortness of breath.     Exam: General:  patient lying in bed without any acute distress    HEENT:  normocephalic/atraumatic  Cardio:  RRR  Pulmonary:  no respiratory distress  Abdomen:  soft, non-tender, bowel sounds present  Extremities:  No pedal edema  Skin:  intact, warm/dry     Neurologic  Mental Status:  fully alert, attentive and oriented to person place, and time, follows commands, speech clear and fluent, naming and repetition intact.   Cranial Nerves:  visual fields intact, PERRL, EOMI with normal smooth pursuit, facial sensation intact and symmetric, facial movements symmetric, hearing not formally tested but intact to conversation, palate elevation symmetric and uvula midline, no dysarthria, shoulder shrug strong bilaterally, tongue protrusion midline  Motor:  no abnormal movements, normal tone throughout, no pronator drift, able to move all limbs spontaneously, strength 5/5 throughout upper and lower extremities  Reflexes:  3+ and symmetric throughout, 3 beats of clonus bilaterally  Sensory:  intact/symmetric to light touch throughout upper and lower extremities  Coordination:  FNF intact without dysmetria    Assessment and Plan  Ms. Mary Pemberton is a 61 year old woman with with a complex past medical history including reported TIA, stage IV melanoma with brain metastasis s/p whole brain radiation and atrial fibrillation who presented with a transient episode of speech difficulty.     TIA vs Seizure: She has had transient episodes of speech difficulty in the past with imaging unremarkable for stroke. These symptoms may be due to TIA as she is at risk for stroke with a history of atrial  fibrillation and right atrial repair. Her symptoms may also be due to seizure as she also has a history of whole brain radiation. Will need to further assess for seizure with EEG and for clot on TTE.       - Continuous EEG monitoring  - Complete TTE with bubble study   - Continue aspirin 325mg  - Continue Simvastatin 40mg    #Stage IV melanoma with brain, lung, and right atrial metastases s/p whole brain radiation, IL-2 therapy, and right atrial repair  - MRI showed no evidence of new enhancing lesion   - TTE to assess right atrium     #History of atrial fibrillation: Previously on rivaroxaban but stopped due to nose bleeds then continued on aspirin 162mg  - Continue aspirin 325mg for now     #History of cognitive dysfunction: Likely secondary to whole brain radiation. Continue PTA donepezil    #History of depression: continue PTA citalopram    FEN: Combination diet  DVT ppx: SCDs  Code status: full code    The patient was seen and discussed with the staff Neurologist, Dr. Martinez.    Clarence Ann  Neurology PGY1  Pager: [phone]

## 2017-03-17 NOTE — PLAN OF CARE
Problem: Goal Outcome Summary  Goal: Goal Outcome Summary  Orders received for swallow eval but deferred- service no longer needed- pt passed swallow screen and already advanced to a regular diet - per RN is tolerating without difficulty and also prior speech difficulty resolved now due to dx of TIA- was transient- speech and cogntive function back to baseline per pt/staff. Will d/c order

## 2017-03-17 NOTE — ED NOTES
"In to attempt swallow screen for dysphagia. Pt already drinking water that her  had given her. Pt tolerating water and not having any s/s of dysphagia. Pt reports that she is feeling \"fine\" and is aware that she will be having further image testing along with admission to the hospital.   "

## 2017-03-17 NOTE — ED NOTES
stated symptoms started 25 minutes PTA and then restated that symptoms started at 1700. On arrival pt had slurred speech. Speech started to clear. Pt continues to have short term memory problems,  stated pt has memory problems.

## 2017-03-17 NOTE — DISCHARGE INSTRUCTIONS
What to expect when you have contrast    During your exam, we will inject  contrast  into your vein or artery. (Contrast is a clear liquid with iodine in it. It shows up on X-rays.)    You may feel warm or hot. You may have a metal taste in your mouth and a slight upset stomach. You may also feel pressure near the kidneys and bladder. These effects will last about 1 to 3 minutes.    Please tell us if you have:    Sneezing     Itching    Hives     Swelling in the face    A hoarse voice    Breathing problems    Other new symptoms    Serious problems are rare.  They may include:    Irregular heartbeat     Seizures    Kidney failure              Tissue damage    Shock      Death    If you have any problems during the exam, we  will treat them right away.    When you get home    Call your hospital if you have any new symptoms in the next 2 days, like hives or swelling. (Phone numbers are at the bottom of this page.) Or call your family doctor.     If you have wheezing or trouble breathing, call 911.    Self-care  -Drink at least 4 extra glasses of water today.   This reduces the stress on your kidneys.  -Keep taking your regular medicines.    The contrast will pass out of your body in your  Urine(pee). This will happen in the next 24 hours. You  will not feel this. Your urine will not  change color.    If you have kidney problems or take metformin    Drink 4 to 8 large glasses of water for the next  2 days, if you are not on a fluid restriction.    ?If you take metformin (Glucophage or Glucovance) for diabetes, keep taking it.      ?Your kidney function tests are abnormal.  If you take Metformin, do not take it for 48 hours. Please go to your clinic for a blood test within 3 days after your exam before the restarting this medicine.     (Note to provider:please give patient prescription for lab tests.)    ?Special instructions:     I have read and understand the above information.    Patient Sign  Here:______________________________________Date:________Time:______    Staff Sign Here:________________________________________Date:_______Time:______      Radiology Departments:     ?[facility]: [phone] ?[place]: [phone]     ?[place]: [phone] ?[place]:[phone]      ?[place]: [phone]  ?[place]: [phone]  ?[place]:[phone]    ?[facility] [place]:[phone]  ?[facility]:[phone]

## 2017-03-18 ENCOUNTER — ALLIED HEALTH/NURSE VISIT (OUTPATIENT)
Dept: NEUROLOGY | Facility: CLINIC | Age: 62
DRG: 069 | End: 2017-03-18
Attending: PSYCHIATRY & NEUROLOGY
Payer: MEDICARE

## 2017-03-18 VITALS
BODY MASS INDEX: 29.02 KG/M2 | OXYGEN SATURATION: 99 % | RESPIRATION RATE: 16 BRPM | HEART RATE: 50 BPM | TEMPERATURE: 98.1 F | DIASTOLIC BLOOD PRESSURE: 55 MMHG | SYSTOLIC BLOOD PRESSURE: 98 MMHG | HEIGHT: 64 IN | WEIGHT: 170 LBS

## 2017-03-18 DIAGNOSIS — G45.9 TRANSIENT CEREBRAL ISCHEMIA, UNSPECIFIED TYPE: Primary | ICD-10-CM

## 2017-03-18 LAB
ABO + RH BLD: NORMAL
ABO + RH BLD: NORMAL
BLD GP AB SCN SERPL QL: NORMAL
BLOOD BANK CMNT PATIENT-IMP: NORMAL
CHOLEST SERPL-MCNC: 204 MG/DL
GLUCOSE BLDC GLUCOMTR-MCNC: 112 MG/DL (ref 70–99)
GLUCOSE BLDC GLUCOMTR-MCNC: 114 MG/DL (ref 70–99)
GLUCOSE BLDC GLUCOMTR-MCNC: 119 MG/DL (ref 70–99)
GLUCOSE BLDC GLUCOMTR-MCNC: 140 MG/DL (ref 70–99)
HDLC SERPL-MCNC: 44 MG/DL
LDLC SERPL CALC-MCNC: 98 MG/DL
NONHDLC SERPL-MCNC: 160 MG/DL
SPECIMEN EXP DATE BLD: NORMAL
TRIGL SERPL-MCNC: 306 MG/DL

## 2017-03-18 PROCEDURE — 00000146 ZZHCL STATISTIC GLUCOSE BY METER IP

## 2017-03-18 PROCEDURE — 86900 BLOOD TYPING SEROLOGIC ABO: CPT | Performed by: EMERGENCY MEDICINE

## 2017-03-18 PROCEDURE — 40000894 ZZH STATISTIC OT IP EVAL DEFER

## 2017-03-18 PROCEDURE — 80061 LIPID PANEL: CPT | Performed by: EMERGENCY MEDICINE

## 2017-03-18 PROCEDURE — 25000132 ZZH RX MED GY IP 250 OP 250 PS 637: Mod: GY | Performed by: PSYCHIATRY & NEUROLOGY

## 2017-03-18 PROCEDURE — 95951 ZZHC EEG VIDEO < 12 HR: CPT | Mod: 52,ZF

## 2017-03-18 PROCEDURE — 86850 RBC ANTIBODY SCREEN: CPT | Performed by: EMERGENCY MEDICINE

## 2017-03-18 PROCEDURE — A9270 NON-COVERED ITEM OR SERVICE: HCPCS | Mod: GY | Performed by: PSYCHIATRY & NEUROLOGY

## 2017-03-18 PROCEDURE — 36415 COLL VENOUS BLD VENIPUNCTURE: CPT | Performed by: EMERGENCY MEDICINE

## 2017-03-18 PROCEDURE — 86901 BLOOD TYPING SEROLOGIC RH(D): CPT | Performed by: EMERGENCY MEDICINE

## 2017-03-18 RX ORDER — SENNOSIDES 8.6 MG
1 TABLET ORAL 2 TIMES DAILY PRN
Status: DISCONTINUED | OUTPATIENT
Start: 2017-03-18 | End: 2017-03-18 | Stop reason: HOSPADM

## 2017-03-18 RX ORDER — MAGNESIUM CARB/ALUMINUM HYDROX 105-160MG
296 TABLET,CHEWABLE ORAL ONCE
Status: DISCONTINUED | OUTPATIENT
Start: 2017-03-18 | End: 2017-03-18 | Stop reason: HOSPADM

## 2017-03-18 RX ADMIN — CITALOPRAM HYDROBROMIDE 20 MG: 20 TABLET ORAL at 08:38

## 2017-03-18 RX ADMIN — PANTOPRAZOLE SODIUM 40 MG: 40 TABLET, DELAYED RELEASE ORAL at 08:38

## 2017-03-18 RX ADMIN — ASPIRIN 325 MG: 325 TABLET, DELAYED RELEASE ORAL at 08:38

## 2017-03-18 ASSESSMENT — VISUAL ACUITY
OU: NORMAL ACUITY

## 2017-03-18 ASSESSMENT — PAIN DESCRIPTION - DESCRIPTORS: DESCRIPTORS: ACHING

## 2017-03-18 NOTE — PROCEDURES
INPATIENT VIDEO EEG      EEG#:  .      DATE OF STUDY:  2017.      DURATION OF RECORDING:  3 hours 2 minutes 31 seconds.      HISTORY:  Day 1 of video EEG monitoring in patient, Mary Pemberton, a 61-year-old being evaluated for potential epilepsy.  She has had several episodes of slurred speech.  She is not currently being treated with anti-seizure medications.      TECHNICAL SUMMARY.  EEG was recorded digitally from scalp electrodes applied according to the 10-20 international system.  Additional electrodes were applied as reference and for further analysis.  All EEG was screened with spike and seizure detection software and was further reviewed by a trained technician and physician electroencephalography.  Video was continuously recorded and reviewed for clinical correlation and to facilitate EEG interpretation.      FINDINGS:  During quiet wakefulness, 7.5 to 8 Hz posterior dominant rhythm, well sustained, symmetrical, reactive.  With drowsiness, there is attenuation of background activity.  Deeper sleep was not seen in this study.  Hyperventilation and photic stimulation were not performed.  There was no clear focal slowing.      OTHER INTERICTAL ABNORMALITIES:  No epileptiform discharges.      ICTAL ABNORMALITIES:  No electrographic seizures.  No events marked by patient.        IMPRESSION:  Minimally abnormal.  There is a minor slowing of posterior and dominant rhythm suggesting minimal diffuse encephalopathy.  Focal slowing as might be seen following transient ischemic attack was not recorded.  Epileptiform activity was not seen in this study, which was mostly confined to wakefulness and drowsiness.         BEBA MARIN MD             D: 2017 10:59   T: 2017 11:10   MT: CARLOS      Name:     MARY PEMBERTON   MRN:      [id number]        Account:        [id number]   :      1955           Procedure Date: 2017      Document: [id number]

## 2017-03-18 NOTE — PLAN OF CARE
Problem: Goal Outcome Summary  Goal: Goal Outcome Summary  Outcome: No Change  VSS ex alexander. 8 & 12 neuros intact ex forgetful. EEG leads d/c. BG below 150. Void x2. Pt c/o constipation. D/c PIV. Bowel meds ordered but did not give prior to d/c. Continue POC.

## 2017-03-18 NOTE — PROGRESS NOTES
EEG CLINICAL NEUROPHYSIOLOGY PRELIMINARY REPORT    First 30 minutes of video-EEG reviewed. Mild slowing of background frequency. No focal changes. No epileptiform discharges. No electrographic seizures. Will continue video-EEG monitoring. Full report to follow.    Gordy Hernandez MD  Pager [phone]

## 2017-03-18 NOTE — PLAN OF CARE
Problem: Goal Outcome Summary  Goal: Goal Outcome Summary  Outcome: No Change  Pt on 6A for VEEG monitoring. VSS. Neuros intact except forgetful. No events witnessed or reported this shift. Denies pain. PIV SL. Tolerates regular diet. Voids spontaneously. Up with SBA, gb. Pt needs to be reminded of seizure precautions. On continuous cardiac monitor, sinus alexander. Continue to monitor and follow current POC.

## 2017-03-18 NOTE — PROGRESS NOTES
EEG CLINICAL NEUROPHYSIOLOGY PRELIMINARY REPORT    Video-EEG through 0600 today reviewed. Full report to follow. Minor slowing of posterior dominant rhythm consistent with mild diffuse encephalopathy. No focal slowing. No epileptiform activity during wakefulness of sleep. Given MRI findings paid special attention to left parietal region and did not detect epileptiform activity here. No seizures or events marked.    Gordy Hernandez MD  Pager [phone]

## 2017-03-18 NOTE — PROGRESS NOTES
Reviewed D/C instructions and medications with pt and . Both stated understanding without further questions. Pt ambulated from building around 1345, with  as .

## 2017-03-18 NOTE — PLAN OF CARE
Problem: Goal Outcome Summary  Goal: Goal Outcome Summary  PT 6A:  Determined that PT eval was not indicated even prior to pt d[facility].  Per chart review, MD notes, and discussion with RN staff pt was mobilizing without assist and adequate safety.  Symptoms found on admission had resolved, and PT assessment was not indicated.

## 2017-03-18 NOTE — PLAN OF CARE
Problem: Goal Outcome Summary  Goal: Goal Outcome Summary  OT 6A: Defer. Per chart review, in note from MD no rehab needs due to no ongoing symptoms. OT discussing patient's cognition and home set up to ensure safe discharge location, RN reports patient lives at home with  to assist and cognition is baseline. Patient with no acute OT needs at this time, will complete order. Please reconsult if changes arise.

## 2017-03-18 NOTE — PROGRESS NOTES
Social Work: Assessment with Discharge Plan    Patient Name:  Benjamin Pemberton  :  1955  Age:  61 year old  MRN:  [id number]  Risk/Complexity Score:     Completed assessment with:  Pt    Presenting Information   Reason for Referral:  Stroke consult  Date of Intake:  2017  Referral Source:  Chart Review  Decision Maker:  Pt herself  Alternate Decision Maker:   Carlos Manuel  Health Care Directive:  Declined completing  Living Situation: Lives in a  House with   Previous Functional Status:  Independent  Patient and family understanding of hospitalization:  yes  Cultural/Language/Spiritual Considerations: Anabaptism  Adjustment to Illness:  adjusting well     Physical Health  Reason for Admission:    1. TIA (transient ischemic attack)      Services Needed/Recommended:  Home with no services    Mental Health/Chemical Dependency  Diagnosis: depressive disorders  Support/Services in Place:  None   Services Needed/Recommended:  None     Support System  Significant relationship at present time:   and two children   Family of origin is available for support:  No one was present in room but per pt children came to see her this morning  Other support available:  None   Gaps in support system:  None   Patient is caregiver to:  None     Provider Information   Primary Care Physician:  Sam Tanner   [phone]   Clinic:  [address] [place] / [place] [address]      :  None     Financial   Income Source:  SS and savings   Financial Concerns:  None   Insurance:    Payor/Plan Subscriber Name Rel Member # Group #   MEDICARE - MEDICARE BENJAMIN PEMBERTON  [id number]       ATTN CLAIMS, [address]   BCBS - BCBS OF [place]  [id number]      [address] [address]       Discharge Plan   Patient and family discharge goal: to be able to discharge home. OT cleared her and pt is able to walk with little to no help  Provided education on discharge plan:  YES  Patient agreeable to  discharge plan:  YES  A list of Medicare Certified Facilities was provided to the patient and/or family to encourage patient choice. Patient's choices for facility are:  No - pt is d/cing home   Will NH provide Skilled rehabilitation or complex medical:  N/A  General information regarding anticipated insurance coverage and possible out of pocket cost was discussed. Patient and patient's family are aware patient may incur the cost of transportation to the facility, pending insurance payment: not discussed  Barriers to discharge:  None     Discharge Recommendations   Anticipated Disposition:  Home, no needs identified  Transportation Needs:  Family:   with provide transportation   Name of Transportation Company and Phone:  family    Additional comments   None    ANA LUISA Maravilla     3/18/2017

## 2017-03-18 NOTE — MR AVS SNAPSHOT
After Visit Summary   3/18/2017    Mary Pemberton    MRN: [id number]           Patient Information     Date Of Birth          1955        Visit Information        Provider Department      3/18/2017 7:00 AM [facility] EEG TECH 4 [facility] EEG        Today's Diagnoses     Transient cerebral ischemia, unspecified type    -  1       Follow-ups after your visit        Your next 10 appointments already scheduled     Mar 19, 2017  9:00 AM CDT   Stroke/Tia - [address] with Adrianne Lee RN   [facility], [place], Patient Learning Center ([facility], [facility])    3rd Floor  [address] [facility]  [facility] [address]              Appointment is located at [address] [place] [address]              Who to contact     Please call your clinic at [phone] to:    Ask questions about your health    Make or cancel appointments    Discuss your medicines    Learn about your test results    Speak to your doctor   If you have compliments or concerns about an experience at your clinic, or if you wish to file a complaint, please contact [facility] Patient Relations at [phone] or email us at [email or web address]         Additional Information About Your Visit        MyChart Information     [employer] is an electronic gateway that provides easy, online access to your medical records. With [employer], you can request a clinic appointment, read your test results, renew a prescription or communicate with your care team.     To sign up for [employer] visit the website at [email or web address]   You will be asked to enter the access code listed below, as well as some personal information. Please follow the directions to create your username and password.     Your access code is: [email or web address]  Expires: 2017 11:13 AM     Your access code will  in 90 days. If you need help or a new code, please contact your  [facility] or call [phone] for assistance.        Care EveryWhere ID     This is your Care EveryWhere ID. This could be used by other organizations to access your [place] [facility] medical records  [id number]         Blood Pressure from Last 3 Encounters:   03/18/17 98/55   03/16/17 120/83   02/03/17 122/82    Weight from Last 3 Encounters:   03/17/17 77.1 kg (170 lb)   02/03/17 78 kg (172 lb)   11/15/16 80.7 kg (178 lb)              We Performed the Following     Glucose by meter          Today's Medication Changes      Notice     This visit is during an admission. Changes to the med list made in this visit will be reflected in the After Visit Summary of the admission.             Primary Care Provider Office Phone # Fax #    Sam Tanner [phone]       [facility] [address] [facility] [address]        Thank you!     Thank you for choosing [facility]  for your care. Our goal is always to provide you with excellent care. Hearing back from our patients is one way we can continue to improve our services. Please take a few minutes to complete the written survey that you may receive in the mail after your visit with us. Thank you!             Your Updated Medication List - Protect others around you: Learn how to safely use, store and throw away your medicines at www.disposemymeds.org.      Notice     This visit is during an admission. Changes to the med list made in this visit will be reflected in the After Visit Summary of the admission.

## 2017-03-19 NOTE — PROCEDURES
EEG#:  -2      DATE OF STUDY:  2017      DURATION OF STUDY:  12 hours 7 minutes and 4 seconds.    TYPE OF STUDY: Inpatient Video-EEG Monitoring      HISTORY:  Day 2 of video monitoring in patient Mary Pemberton, a 61-year-old being evaluated for epilepsy.  She had spells of speech deficit.  She is being evaluated for epilepsy.  She is not currently being treated with anti-seizure medications.      TECHNICAL SUMMARY:  EEG recorded digitally from scalp electrodes placed according to the 10-20 International system.  Additional electrodes were utilized for referencing and further analysis.  Video was continuously recorded and reviewed for purposes of clinical correlation and to assist with EEG interpretation.      FINDINGS:  While asleep, symmetrical sleep spindles.  Moderate alpha persistence.  Poorly formed vertex waves.  Fp1 electrode is off shortly after patient awakens and stays off for the remainder of the study so recordings from this area are not available.  A sustained 9-10 Hz posterior dominant rhythm is seen on occasion and is symmetrical and reactive.  There is no clear focal slowing.      Hyperventilation and photic stimulation adds no information.      INTERICTAL  ABNORMALITIES:  No epileptiform discharges.      ICTAL  ABNORMALITIES:  No electrographic seizures.        IMPRESSION:  Normal video monitoring study.  No epileptiform activity or seizures.      SUMMARY OF 2 DAYS OF VIDEO EEG MONITORING:  Normal posterior dominant rhythm.  Normal sleep.  No focal slowing.  No epileptiform activity.  The patient's spells were not recorded.         BEBA MARIN MD             D: 2017 15:29   T: 2017 18:07   MT: RON      Name:     MARY PEMBERTON   MRN:      [id number]        Account:        [id number]   :      1955           Procedure Date: 2017      Document: [id number]

## 2017-03-20 ENCOUNTER — CARE COORDINATION (OUTPATIENT)
Dept: CARDIOLOGY | Facility: CLINIC | Age: 62
End: 2017-03-20

## 2017-03-20 NOTE — PROGRESS NOTES
Patient was in contact with a RN for post DC cares so no post DC follow up call will be made            Mar 19, 2017 9:00 AM CDT   Stroke/Tia - [address] [facility] [address] with Adrianne Lee RN   [facility], [place], Patient Learning Center ([facility], [facility])

## 2017-03-28 ENCOUNTER — OFFICE VISIT (OUTPATIENT)
Dept: FAMILY MEDICINE | Facility: OTHER | Age: 62
End: 2017-03-28
Attending: FAMILY MEDICINE
Payer: MEDICARE

## 2017-03-28 VITALS
WEIGHT: 172 LBS | BODY MASS INDEX: 29.52 KG/M2 | RESPIRATION RATE: 18 BRPM | SYSTOLIC BLOOD PRESSURE: 98 MMHG | DIASTOLIC BLOOD PRESSURE: 62 MMHG | HEART RATE: 76 BPM | TEMPERATURE: 97.6 F

## 2017-03-28 DIAGNOSIS — C43.9 METASTATIC MALIGNANT MELANOMA (H): ICD-10-CM

## 2017-03-28 DIAGNOSIS — G96.89 SUPERFICIAL SIDEROSIS OF CENTRAL NERVOUS SYSTEM: Primary | ICD-10-CM

## 2017-03-28 PROCEDURE — 99214 OFFICE O/P EST MOD 30 MIN: CPT | Performed by: FAMILY MEDICINE

## 2017-03-28 PROCEDURE — 99212 OFFICE O/P EST SF 10 MIN: CPT

## 2017-03-28 NOTE — NURSING NOTE
"Chief Complaint   Patient presents with     \[facility]\""/U     [place], symptoms of stroke from [place], no stroke, possible seizure       Initial BP 98/62 (BP Location: Right arm, Patient Position: Chair, Cuff Size: Adult Regular)  Pulse 76  Temp 97.6  F (36.4  C)  Resp 18  Wt 172 lb (78 kg)  BMI 29.52 kg/m2 Estimated body mass index is 29.52 kg/(m^2) as calculated from the following:    Height as of 3/17/17: 5' 4\" (1.626 m).    Weight as of this encounter: 172 lb (78 kg).  Medication Reconciliation: complete    Misti Khan    "

## 2017-03-28 NOTE — PROGRESS NOTES
SUBJECTIVE:  Mary Pemberton, 61 year old, female is seen in follow up of johann. She developed an episode of slurred speech while reading the newspaper.  She was seen in the ER and her symptoms resolved.  Mary has a previous history of CVA as well as metastatic malignant melanoma. Because of her risk for stroke and previous radiation therapy she was transferred to the [facility].  She was seen by Neurology and was felt to have been seizure like activity secondary to post radiation siderosis.  Records are reviewed and discussed with patient and .  She has not had any recurrent symptoms.  Mary remains on aspirin.    Denies fever, headache, visual disturbance, dizziness, focal weakness, numbness, tingling, paresthesias, tremor, or gait disturbance.    Current Outpatient Prescriptions   Medication Sig Dispense Refill     zolpidem (AMBIEN) 10 MG tablet TAKE ONE TABLET BY MOUTH AT BEDTIME AS NEEDED FOR SLEEP 30 tablet 0     mometasone (ELOCON) 0.1 % cream Apply sparingly to affected area twice daily as needed.  Do not apply to face. 15 g 0     simvastatin (ZOCOR) 40 MG tablet TAKE ONE TABLET BY MOUTH AT BEDTIME 90 tablet 0     citalopram (CELEXA) 20 MG tablet Take 1 tablet (20 mg) by mouth daily 90 tablet 0     pantoprazole (PROTONIX) 40 MG EC tablet TAKE ONE TABLET BY MOUTH ONCE DAILY 90 tablet 2     LORazepam (ATIVAN) 0.5 MG tablet TAKE ONE TABLET BY MOUTH TWICE DAILY AS NEEDED FOR ANXIETY 60 tablet 0     donepezil (ARICEPT) 10 MG tablet TAKE ONE TABLET BY MOUTH AT BEDTIME 90 tablet 1     Omega-3 Fatty Acids (OMEGA-3 FISH OIL PO) Take 1 capsule by mouth daily        aspirin (ASPIRIN ADULT LOW STRENGTH) 81 MG chewable tablet Take 162 mg by mouth daily  36 tablet      cyanocolbalamin (VITAMIN  B-12) 100 MCG tablet Take 50 mcg by mouth daily.          Allergies   Allergen Reactions     Codeine Nausea and Vomiting     Codeine Phosphate      Quinolones      Pt intolerance to steroids also       Past Medical  History:   Diagnosis Date     Atrial fibrillation (H)     with Bradycardia     CAD (coronary atherosclerotic disease) 11/14/2012     CVA (cerebral infarction) 04/28/2015    with TPA     Dementia 10/19/2012     Depressive disorder, not elsewhere classified 4/22/2008     GERD (gastroesophageal reflux disease) 11/14/2012     H/O: stroke 4/28/2015     Localized superficial swelling, mass, or lump 9/9/2004     Lump or mass in breast 1/3/2000     Malignant melanoma of skin of upper limb, including shoulder (H) 9/4/2001     Melanoma of skin (H) 4/16/2002     Problem list name updated by automated process. Provider to review     Melanoma of skin, site unspecified 4/16/2002     Other and unspecified hyperlipidemia 9/12/2002     Other specified episodic mood disorder 8/16/2002     Pre-diabetes 9/13/2011     Rash and other nonspecific skin eruption 11/7/2005     Past Surgical History:   Procedure Laterality Date     arm      LT     brain bx       COLONOSCOPY  [id number]     open heart       Family History   Problem Relation Age of Onset     CANCER Father      lung     Hypertension Mother      CANCER Maternal Grandfather      Social History     Social History     Marital status:      Spouse name: N/A     Number of children: N/A     Years of education: N/A     Occupational History      Retired      Disabled     Social History Main Topics     Smoking status: Former Smoker     Packs/day: 1.00     Years: 20.00     Types: Cigarettes     Quit date: 6/6/2002     Smokeless tobacco: Never Used      Comment: year quit 2001; no passive exposure.     Alcohol use No     Drug use: No     Sexual activity: Not on file     Other Topics Concern     Blood Transfusions Yes     Caffeine Concern Yes     coffee - 2 cups daily     Exercise Yes     walking daily     Parent/Sibling W/ Cabg, Mi Or Angioplasty Before 65f 55m? No     Social History Narrative         Review Of Systems  Constitutional, HEENT, cardiovascular, pulmonary, gi and gu systems  are negative, except as otherwise noted.    OBJECTIVE:  Vitals: B/P: 98/62, T: 97.6, P: 76, R: 18    Exam:  Physical Exam   Constitutional: She is oriented to person, place, and time. She appears well-developed and well-nourished. No distress.   Neurological: She is alert and oriented to person, place, and time.   Psychiatric: She has a normal mood and affect.     Other exam not repeated    Labs:  Admission on 03/17/2017, Discharged on 03/18/2017   Component Date Value Ref Range Status     WBC 03/17/2017 7.1  4.0 - 11.0 10e9/L Final     RBC Count 03/17/2017 3.80  3.8 - 5.2 10e12/L Final     Hemoglobin 03/17/2017 11.7  11.7 - 15.7 g/dL Final     Hematocrit 03/17/2017 35.5  35.0 - 47.0 % Final     MCV 03/17/2017 93  78 - 100 fl Final     MCH 03/17/2017 30.8  26.5 - 33.0 pg Final     MCHC 03/17/2017 33.0  31.5 - 36.5 g/dL Final     RDW 03/17/2017 13.0  10.0 - 15.0 % Final     Platelet Count 03/17/2017 214  150 - 450 10e9/L Final     Troponin I ES 03/17/2017   0.000 - 0.045 ug/L Final                    Value:<0.015  The 99th percentile for upper reference range is 0.045 ug/L.  Troponin values in   the range of 0.045 - 0.120 ug/L may be associated with risks of adverse   clinical events.       Sodium 03/17/2017 142  133 - 144 mmol/L Final     Potassium 03/17/2017 4.2  3.4 - 5.3 mmol/L Final     Chloride 03/17/2017 110* 94 - 109 mmol/L Final     Carbon Dioxide 03/17/2017 23  20 - 32 mmol/L Final     Anion Gap 03/17/2017 9  3 - 14 mmol/L Final     Glucose 03/17/2017 105* 70 - 99 mg/dL Final     Urea Nitrogen 03/17/2017 13  7 - 30 mg/dL Final     Creatinine 03/17/2017 0.72  0.52 - 1.04 mg/dL Final     GFR Estimate 03/17/2017 82  >60 mL/min/1.7m2 Final    Non  GFR Calc     GFR Estimate If Black 03/17/2017   >60 mL/min/1.7m2 Final                    Value:>90   GFR Calc       Calcium 03/17/2017 8.5  8.5 - 10.1 mg/dL Final     Hemoglobin A1C 03/17/2017 5.8  4.3 - 6.0 % Final     INR 03/17/2017  1.03  0.86 - 1.14 Final     PTT 03/17/2017 26  22 - 37 sec Final     Specimen Description 03/17/2017 Nares   Final     S Aur Meth Resis PCR 03/17/2017   NEG Final                    Value:Negative  MRSA Negative: SA Positive  MRSA target DNA not detected, presumed negative for MRSA colonization or the   number of bacteria present may be below the limit of detection.  Staphylococcus aureus target DNA detected, presumed positive for SA   colonization.  A positive test does not necessarily indicate the presence of viable organisms.   It is, however, presumptive for the presence of SA.  This result does not   preclude MRSA nasal colonization.  FDA approved assay performed using basno GeneAltiapert(R) real-time PCR.       Interpretation ECG 03/17/2017 Click View Image link to view waveform and result   Final     Color Urine 03/17/2017 Straw   Final     Appearance Urine 03/17/2017 Clear   Final     Glucose Urine 03/17/2017 Negative  NEG mg/dL Final     Bilirubin Urine 03/17/2017 Negative  NEG Final     Ketones Urine 03/17/2017 Negative  NEG mg/dL Final     Specific Gravity Urine 03/17/2017 1.005  1.003 - 1.035 Final     Blood Urine 03/17/2017 Negative  NEG Final     pH Urine 03/17/2017 6.5  5.0 - 7.0 pH Final     Protein Albumin Urine 03/17/2017 Negative  NEG mg/dL Final     Urobilinogen mg/dL 03/17/2017 Normal  0.0 - 2.0 mg/dL Final     Nitrite Urine 03/17/2017 Negative  NEG Final     Leukocyte Esterase Urine 03/17/2017 Negative  NEG Final     Source 03/17/2017 Midstream Urine   Final     Glucose 03/17/2017 117* 70 - 99 mg/dL Final     Glucose 03/17/2017 113* 70 - 99 mg/dL Final     ABO 03/18/2017 O   Final     RH(D) 03/18/2017  Pos   Final     Antibody Screen 03/18/2017 Neg   Final     Test Valid Only At 03/18/2017 [facility]   Final     Specimen Expires 03/18/2017 03/21/2017   Final     Cholesterol 03/18/2017 204* <200 mg/dL Final    Desirable:       <200 mg/dl      Triglycerides 03/18/2017 306* <150 mg/dL Final    Comment: Borderline high:  150-199 mg/dl   High:             200-499 mg/dl   Very high:       >499 mg/dl       HDL Cholesterol 03/18/2017 44* >49 mg/dL Final     LDL Cholesterol Calculated 03/18/2017 98  <100 mg/dL Final    Desirable:       <100 mg/dl     Non HDL Cholesterol 03/18/2017 160* <130 mg/dL Final    Comment: Above Desirable:  130-159 mg/dl   Borderline high:  160-189 mg/dl   High:             190-219 mg/dl   Very high:       >219 mg/dl         ASSESSMENT/PLAN:  Superficial siderosis of central nervous system  Discussed Neurology evaluation and recommendations for ongoing management.  Warning signs discussed.  Follow up one month.    Malignant melanoma, metastatic (H)  No evidence of recurrence.  She has had previous radiation and chemotherapy. Will follow.            Sam Tanner MD

## 2017-04-01 ENCOUNTER — HOSPITAL ENCOUNTER (EMERGENCY)
Facility: HOSPITAL | Age: 62
Discharge: HOME | End: 2017-04-01
Attending: INTERNAL MEDICINE | Admitting: INTERNAL MEDICINE
Payer: MEDICARE

## 2017-04-01 VITALS
OXYGEN SATURATION: 96 % | BODY MASS INDEX: 29.02 KG/M2 | RESPIRATION RATE: 23 BRPM | SYSTOLIC BLOOD PRESSURE: 111 MMHG | TEMPERATURE: 97 F | HEIGHT: 64 IN | DIASTOLIC BLOOD PRESSURE: 64 MMHG | WEIGHT: 170 LBS

## 2017-04-01 DIAGNOSIS — G96.89 SUPERFICIAL SIDEROSIS OF CENTRAL NERVOUS SYSTEM: ICD-10-CM

## 2017-04-01 LAB — GLUCOSE BLDC GLUCOMTR-MCNC: 106 MG/DL (ref 70–99)

## 2017-04-01 PROCEDURE — 99283 EMERGENCY DEPT VISIT LOW MDM: CPT

## 2017-04-01 PROCEDURE — 00000146 ZZHCL STATISTIC GLUCOSE BY METER IP

## 2017-04-01 PROCEDURE — 99283 EMERGENCY DEPT VISIT LOW MDM: CPT | Performed by: INTERNAL MEDICINE

## 2017-04-01 PROCEDURE — 93005 ELECTROCARDIOGRAM TRACING: CPT

## 2017-04-01 NOTE — ED AVS SNAPSHOT
[facility]    [address] [place] [address]    [place] [address]    Phone:  [phone]                                       Mary Pemberton   MRN: [id number]    Department:  [facility]   Date of Visit:  4/1/2017           Patient Information     Date Of Birth          1955        Your diagnoses for this visit were:     Superficial siderosis of central nervous system        You were seen by Yinka Pickard MD.      Follow-up Information     Follow up with Sam Tanner MD.    Specialty:  Family Practice    Contact information:    [facility]  [address] [place] [address]  [phone]          Discharge Instructions         What is a TIA?  A TIA (Transient Ischemic Attack) is an early warning that a stroke (also called a brain attack) is coming. A TIA is a temporary stroke. It causes no lasting damage. But the effects of a stroke, if it happens, can be very serious and lasting. If you think you are having symptoms of a TIA or stroke--even if they don t last--get medical help right away.     If you have any symptoms of a TIA or stroke, call 911 and your doctor as soon as possible.     Symptoms of TIA and stroke  Symptoms may come on suddenly and last for a few seconds or a few hours. You may have symptoms only once. Or they may come and go for days. If you notice any of the following symptoms, don t wait. Call 911 or emergency services right away.    Weakness, numbness, tingling, or loss of feeling in your face, arm, or leg.    Trouble seeing in one or both eyes; double vision.    Slurred speech, trouble talking, or problems understanding others when they speak    Sudden, severe headache    Dizziness or a feeling of spinning    Loss of balance or falling    Blackouts    [id number] [employer]. [address] [place] [address], [place] [address]. All rights reserved. This information is not intended as a substitute for professional medical care. Always follow your healthcare  professional's instructions.             Review of your medicines      Our records show that you are taking the medicines listed below. If these are incorrect, please call your family doctor or clinic.        Dose / Directions Last dose taken    ASPIRIN ADULT LOW STRENGTH 81 MG chewable tablet   Dose:  162 mg   Quantity:  36 tablet   Generic drug:  aspirin        Take 162 mg by mouth daily   Refills:  0        citalopram 20 MG tablet   Commonly known as:  celeXA   Dose:  20 mg   Quantity:  90 tablet        Take 1 tablet (20 mg) by mouth daily   Refills:  0        cyanocolbalamin 100 MCG tablet   Commonly known as:  vitamin  B-12   Dose:  50 mcg        Take 50 mcg by mouth daily.   Refills:  0        donepezil 10 MG tablet   Commonly known as:  ARICEPT   Quantity:  90 tablet        TAKE ONE TABLET BY MOUTH AT BEDTIME   Refills:  1        LORazepam 0.5 MG tablet   Commonly known as:  ATIVAN   Quantity:  60 tablet        TAKE ONE TABLET BY MOUTH TWICE DAILY AS NEEDED FOR ANXIETY   Refills:  0        mometasone 0.1 % cream   Commonly known as:  ELOCON   Quantity:  15 g        Apply sparingly to affected area twice daily as needed.  Do not apply to face.   Refills:  0        OMEGA-3 FISH OIL PO   Dose:  1 capsule        Take 1 capsule by mouth daily   Refills:  0        pantoprazole 40 MG EC tablet   Commonly known as:  PROTONIX   Quantity:  90 tablet        TAKE ONE TABLET BY MOUTH ONCE DAILY   Refills:  2        simvastatin 40 MG tablet   Commonly known as:  ZOCOR   Quantity:  90 tablet        TAKE ONE TABLET BY MOUTH AT BEDTIME   Refills:  0        zolpidem 10 MG tablet   Commonly known as:  AMBIEN   Quantity:  30 tablet        TAKE ONE TABLET BY MOUTH AT BEDTIME AS NEEDED FOR SLEEP   Refills:  0                Procedures and tests performed during your visit     Glucose by meter      Orders Needing Specimen Collection     None      Pending Results     No orders found from 3/30/2017 to 4/2/2017.            Pending  "Culture Results     No orders found from 3/30/2017 to 2017.            Thank you for choosing [facility]       Thank you for choosing [facility] for your care. Our goal is always to provide you with excellent care. Hearing back from our patients is one way we can continue to improve our services. Please take a few minutes to complete the written survey that you may receive in the mail after you visit with us. Thank you!        [employer] Information     [employer] lets you send messages to your doctor, view your test results, renew your prescriptions, schedule appointments and more. To sign up, go to [email or web address] . Click on \"Log in\" on the left side of the screen, which will take you to the Welcome page. Then click on \"Sign up Now\" on the right side of the page.     You will be asked to enter the access code listed below, as well as some personal information. Please follow the directions to create your username and password.     Your access code is: [email or web address]  Expires: 2017 11:13 AM     Your access code will  in 90 days. If you need help or a new code, please call your [facility] clinic or [phone].        Care EveryWhere ID     This is your Care EveryWhere ID. This could be used by other organizations to access your [facility] medical records  [id number]        After Visit Summary       This is your record. Keep this with you and show to your community pharmacist(s) and doctor(s) at your next visit.                  "

## 2017-04-01 NOTE — ED AVS SNAPSHOT
[facility]    [address] [place] [address]    Phone:  [phone]                                       Mary Pemberton   MRN: [id number]    Department:  [facility]   Date of Visit:  4/1/2017           After Visit Summary Signature Page     I have received my discharge instructions, and my questions have been answered. I have discussed any challenges I see with this plan with the nurse or doctor.    ..........................................................................................................................................  Patient/Patient Representative Signature      ..........................................................................................................................................  Patient Representative Print Name and Relationship to Patient    ..................................................               ................................................  Date                                            Time    ..........................................................................................................................................  Reviewed by Signature/Title    ...................................................              ..............................................  Date                                                            Time

## 2017-04-02 NOTE — DISCHARGE INSTRUCTIONS
What is a TIA?  A TIA (Transient Ischemic Attack) is an early warning that a stroke (also called a brain attack) is coming. A TIA is a temporary stroke. It causes no lasting damage. But the effects of a stroke, if it happens, can be very serious and lasting. If you think you are having symptoms of a TIA or stroke--even if they don t last--get medical help right away.     If you have any symptoms of a TIA or stroke, call 911 and your doctor as soon as possible.     Symptoms of TIA and stroke  Symptoms may come on suddenly and last for a few seconds or a few hours. You may have symptoms only once. Or they may come and go for days. If you notice any of the following symptoms, don t wait. Call 911 or emergency services right away.    Weakness, numbness, tingling, or loss of feeling in your face, arm, or leg.    Trouble seeing in one or both eyes; double vision.    Slurred speech, trouble talking, or problems understanding others when they speak    Sudden, severe headache    Dizziness or a feeling of spinning    Loss of balance or falling    Blackouts    [id number] [employer] [address] [place] [address]. All rights reserved. This information is not intended as a substitute for professional medical care. Always follow your healthcare professional's instructions.

## 2017-04-02 NOTE — ED NOTES
Pt presents to ED with c/o slurred speech onset 1920 today, resolving on the ride to the hospital.  Negative fast exam. Blood glucose 106.  Dr. Taylor in room upon patient arrival.     Pt was recently hospitalized at [facility] for same symptoms.     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 Approx 2005.

## 2017-04-03 LAB — GLUCOSE BLDC GLUCOMTR-MCNC: 134 MG/DL (ref 70–99)

## 2017-04-03 ASSESSMENT — ENCOUNTER SYMPTOMS
FEVER: 0
CHEST TIGHTNESS: 0
BLOOD IN STOOL: 0
CHILLS: 0
NECK PAIN: 0
ANAL BLEEDING: 0
NAUSEA: 0
DYSURIA: 0
DIZZINESS: 0
VOICE CHANGE: 0
PALPITATIONS: 0
BACK PAIN: 0
MYALGIAS: 0
NUMBNESS: 0
COLOR CHANGE: 0
WHEEZING: 0
ARTHRALGIAS: 0
ABDOMINAL PAIN: 0
LIGHT-HEADEDNESS: 0
HEMATURIA: 0
ABDOMINAL DISTENTION: 0
VOMITING: 0
COUGH: 0
WOUND: 0
HEADACHES: 0
SHORTNESS OF BREATH: 0
NECK STIFFNESS: 0
FLANK PAIN: 0
CONFUSION: 0
DIAPHORESIS: 0

## 2017-04-13 DIAGNOSIS — F03.90 SENILE DEMENTIA (H): ICD-10-CM

## 2017-04-14 RX ORDER — DONEPEZIL HYDROCHLORIDE 10 MG/1
TABLET, FILM COATED ORAL
Qty: 90 TABLET | Refills: 1 | Status: SHIPPED | OUTPATIENT
Start: 2017-04-14 | End: 2017-10-28

## 2017-04-17 DIAGNOSIS — G47.00 PERSISTENT INSOMNIA: ICD-10-CM

## 2017-04-19 RX ORDER — ZOLPIDEM TARTRATE 10 MG/1
TABLET ORAL
Qty: 30 TABLET | Refills: 0 | Status: SHIPPED | OUTPATIENT
Start: 2017-04-19 | End: 2017-06-08

## 2017-04-19 NOTE — TELEPHONE ENCOUNTER
Ambien      Last Written Prescription Date: 2/28/17  Last Fill Quantity: 30,  # refills: 0   Last Office Visit with G, P or [facility] prescribing provider: 2/3/17

## 2017-04-21 DIAGNOSIS — F32.A DEPRESSION: ICD-10-CM

## 2017-04-24 RX ORDER — CITALOPRAM HYDROBROMIDE 20 MG/1
TABLET ORAL
Qty: 90 TABLET | Refills: 0 | Status: SHIPPED | OUTPATIENT
Start: 2017-04-24 | End: 2017-08-02

## 2017-04-26 DIAGNOSIS — Z12.31 VISIT FOR SCREENING MAMMOGRAM: Primary | ICD-10-CM

## 2017-04-27 DIAGNOSIS — E78.5 HYPERLIPIDEMIA LDL GOAL <160: ICD-10-CM

## 2017-04-28 RX ORDER — SIMVASTATIN 40 MG
TABLET ORAL
Qty: 90 TABLET | Refills: 2 | Status: SHIPPED | OUTPATIENT
Start: 2017-04-28 | End: 2018-03-25

## 2017-05-31 DIAGNOSIS — Z12.31 VISIT FOR SCREENING MAMMOGRAM: Primary | ICD-10-CM

## 2017-05-31 PROCEDURE — G0202 SCR MAMMO BI INCL CAD: HCPCS | Mod: TC

## 2017-06-08 DIAGNOSIS — G47.00 PERSISTENT INSOMNIA: ICD-10-CM

## 2017-06-12 RX ORDER — ZOLPIDEM TARTRATE 10 MG/1
TABLET ORAL
Qty: 30 TABLET | Refills: 0 | Status: SHIPPED | OUTPATIENT
Start: 2017-06-12 | End: 2017-07-20

## 2017-07-14 ENCOUNTER — OFFICE VISIT (OUTPATIENT)
Dept: OTOLARYNGOLOGY | Facility: OTHER | Age: 62
End: 2017-07-14
Attending: PHYSICIAN ASSISTANT
Payer: MEDICARE

## 2017-07-14 VITALS
HEIGHT: 63 IN | TEMPERATURE: 97.2 F | DIASTOLIC BLOOD PRESSURE: 62 MMHG | BODY MASS INDEX: 29.23 KG/M2 | WEIGHT: 165 LBS | HEART RATE: 69 BPM | SYSTOLIC BLOOD PRESSURE: 100 MMHG

## 2017-07-14 DIAGNOSIS — M26.609 TMJ (TEMPOROMANDIBULAR JOINT SYNDROME): ICD-10-CM

## 2017-07-14 DIAGNOSIS — H61.23 BILATERAL IMPACTED CERUMEN: Primary | ICD-10-CM

## 2017-07-14 DIAGNOSIS — H92.02 REFERRED OTALGIA OF LEFT EAR: ICD-10-CM

## 2017-07-14 PROCEDURE — 99213 OFFICE O/P EST LOW 20 MIN: CPT | Mod: 25 | Performed by: PHYSICIAN ASSISTANT

## 2017-07-14 PROCEDURE — 99213 OFFICE O/P EST LOW 20 MIN: CPT | Mod: 25

## 2017-07-14 PROCEDURE — 92504 EAR MICROSCOPY EXAMINATION: CPT | Mod: TC | Performed by: PHYSICIAN ASSISTANT

## 2017-07-14 PROCEDURE — 99203 OFFICE O/P NEW LOW 30 MIN: CPT

## 2017-07-14 PROCEDURE — 69210 REMOVE IMPACTED EAR WAX UNI: CPT | Performed by: PHYSICIAN ASSISTANT

## 2017-07-14 ASSESSMENT — PAIN SCALES - GENERAL: PAINLEVEL: MODERATE PAIN (4)

## 2017-07-14 NOTE — MR AVS SNAPSHOT
After Visit Summary   7/14/2017    Mary Pemberton    MRN: [id number]           Patient Information     Date Of Birth          1955        Visit Information        Provider Department      7/14/2017 11:15 AM Sarah Cisneros PA-C [facility]        Today's Diagnoses     Bilateral impacted cerumen    -  1    TMJ (temporomandibular joint syndrome)        Referred otalgia of left ear          Care Instructions    Ears look good.   There is no fluid/ infection  I suspect your ear pain is related to TMJ  Follow TMJ precautions.   Return to dental guard use    If there are concerns or questions, Call [phone] and ask for nurse    Based on today's history and physical exam, I can find no evidence of middle ear pathology or eustachian tube dysfunction.  At this point my primary diagnosis is of temporomandibular syndrome.  I have discussed the etiology of TMJ, and the reasons why referred pain can mimic symptoms of ear disease, headaches, and even sinusitis.  i have given the patient an instructional sheet of things to be tried at home, as well a referral to a TMJ specialist should it be needed.  Finally, I counseled the patient that should the therapy not help, or should the symptoms change, that they should return to me.          Follow-ups after your visit        Follow-up notes from your care team     Return in about 1 year (around 7/14/2018), or if symptoms worsen or fail to improve.      Who to contact     If you have questions or need follow up information about today's clinic visit or your schedule please contact [facility] directly at [phone].  Normal or non-critical lab and imaging results will be communicated to you by MyChart, letter or phone within 4 business days after the clinic has received the results. If you do not hear from us within 7 days, please contact the clinic through MyChart or phone. If you have a critical or abnormal lab result, we will notify you by  "phone as soon as possible.  Submit refill requests through [email or web address] or call your pharmacy and they will forward the refill request to us. Please allow 3 business days for your refill to be completed.          Additional Information About Your Visit        [employer] Information     [email or web address] lets you send messages to your doctor, view your test results, renew your prescriptions, schedule appointments and more. To sign up, go to [email or web address] . Click on \"Log in\" on the left side of the screen, which will take you to the Welcome page. Then click on \"Sign up Now\" on the right side of the page.     You will be asked to enter the access code listed below, as well as some personal information. Please follow the directions to create your username and password.     Your access code is: [email or web address]  Expires: 10/12/2017 11:20 AM     Your access code will  in 90 days. If you need help or a new code, please call your [facility] clinic or [phone].        Care EveryWhere ID     This is your Care EveryWhere ID. This could be used by other organizations to access your [facility] medical records  [id number]        Your Vitals Were     Pulse Temperature Height BMI (Body Mass Index)          69 97.2  F (36.2  C) (Tympanic) 5' 3\" (1.6 m) 29.23 kg/m2         Blood Pressure from Last 3 Encounters:   17 100/62   17 111/64   17 98/62    Weight from Last 3 Encounters:   17 165 lb (74.8 kg)   17 170 lb (77.1 kg)   17 172 lb (78 kg)              Today, you had the following     No orders found for display       Primary Care Provider Office Phone # Fax #    Sam Tanner [phone]       [facility] [address] [place] [address]        Equal Access to Services     [facility] : Benny Kelly, frannie wong, qaybkelly jones . [facility] [phone].    ATENCIÓN: Si andrade mcgovern " disposición servicios gratuitos de asistencia lingüística. Linden pacheco [phone].    We comply with applicable federal civil rights laws and [place] laws. We do not discriminate on the basis of race, color, national origin, age, disability sex, sexual orientation or gender identity.            Thank you!     [facility] you [facility]  [facility]. Our goal is always to provide you with excellent care. Hearing back from our patients is one way we can continue to improve our services. Please take a few minutes to complete the written survey that you may receive in the mail after your visit with us. Thank you!             Your Updated Medication List - Protect others around you: Learn how to safely use, store and throw away your medicines at www.disposemymeds.org.          This list is accurate as of: 7/14/17 11:20 AM.  Always use your most recent med list.                   Brand Name Dispense Instructions for use Diagnosis    ASPIRIN ADULT LOW STRENGTH 81 MG chewable tablet   Generic drug:  aspirin     36 tablet    Take 162 mg by mouth daily    CVA (cerebral infarction)       citalopram 20 MG tablet    celeXA    90 tablet    TAKE ONE TABLET BY MOUTH ONCE DAILY    Depression       cyanocolbalamin 100 MCG tablet    vitamin  B-12     Take 50 mcg by mouth daily.        donepezil 10 MG tablet    ARICEPT    90 tablet    TAKE ONE TABLET BY MOUTH AT BEDTIME    Senile dementia       LORazepam 0.5 MG tablet    ATIVAN    60 tablet    TAKE ONE TABLET BY MOUTH TWICE DAILY AS NEEDED FOR ANXIETY    Anxiety       mometasone 0.1 % cream    ELOCON    15 g    Apply sparingly to affected area twice daily as needed.  Do not apply to face.    Intrinsic eczema       OMEGA-3 FISH OIL PO      Take 1 capsule by mouth daily        pantoprazole 40 MG EC tablet    PROTONIX    90 tablet    TAKE ONE TABLET BY MOUTH ONCE DAILY    Esophageal reflux       simvastatin 40 MG tablet    ZOCOR    90 tablet    TAKE ONE TABLET BY  MOUTH AT BEDTIME    Hyperlipidemia LDL goal <160       zolpidem 10 MG tablet    AMBIEN    30 tablet    TAKE ONE TABLET BY MOUTH AT BEDTIME AS NEEDED FOR SLEEP    Persistent insomnia

## 2017-07-14 NOTE — NURSING NOTE
"Chief Complaint   Patient presents with     Cerumen Impaction     Pt is here for an ear cleaning.       Initial /62 (BP Location: Right arm, Cuff Size: Adult Regular)  Pulse 69  Temp 97.2  F (36.2  C) (Tympanic)  Ht 5' 3\" (1.6 m)  Wt 165 lb (74.8 kg)  BMI 29.23 kg/m2 Estimated body mass index is 29.23 kg/(m^2) as calculated from the following:    Height as of this encounter: 5' 3\" (1.6 m).    Weight as of this encounter: 165 lb (74.8 kg).  Medication Reconciliation: complete   Verónica Enriquez LPN      "

## 2017-07-14 NOTE — PROGRESS NOTES
Chief Complaint   Patient presents with     Cerumen Impaction     Pt is here for an ear cleaning.   She presents with bilateral cerumen impactions. She has a history of cerumen impactions. She tried Debrox without relief. She has no history of COM or otologic surgeries.   She reports no hearing concerns otherwise. Denies dizziness, vertigo.       Mary had her ears cleaned out upstairs last year.   She has no new concerns.    She did have her  clean it out and got some out.   Mary has some otalgia, left>RIGHT.   She does grind her teeth. She has a guard but does not use it. ASA helps with pain.   She has no otorrhea.       Past Medical History:   Diagnosis Date     Atrial fibrillation (H)     with Bradycardia     CAD (coronary atherosclerotic disease) 11/14/2012     CVA (cerebral infarction) 04/28/2015    with TPA     Dementia 10/19/2012     Depressive disorder, not elsewhere classified 4/22/2008     GERD (gastroesophageal reflux disease) 11/14/2012     H/O: stroke 4/28/2015     Localized superficial swelling, mass, or lump 9/9/2004     Lump or mass in breast 1/3/2000     Malignant melanoma of skin of upper limb, including shoulder (H) 9/4/2001     Melanoma of skin (H) 4/16/2002     Problem list name updated by automated process. Provider to review     Melanoma of skin, site unspecified 4/16/2002     Other and unspecified hyperlipidemia 9/12/2002     Other specified episodic mood disorder 8/16/2002     Pre-diabetes 9/13/2011     Rash and other nonspecific skin eruption 11/7/2005        Allergies   Allergen Reactions     Codeine Nausea and Vomiting     Codeine Phosphate      Quinolones      Pt intolerance to steroids also     Current Outpatient Prescriptions   Medication     zolpidem (AMBIEN) 10 MG tablet     simvastatin (ZOCOR) 40 MG tablet     citalopram (CELEXA) 20 MG tablet     donepezil (ARICEPT) 10 MG tablet     mometasone (ELOCON) 0.1 % cream     pantoprazole (PROTONIX) 40 MG EC tablet     LORazepam  "(ATIVAN) 0.5 MG tablet     Omega-3 Fatty Acids (OMEGA-3 FISH OIL PO)     aspirin (ASPIRIN ADULT LOW STRENGTH) 81 MG chewable tablet     cyanocolbalamin (VITAMIN  B-12) 100 MCG tablet     No current facility-administered medications for this visit.       ROS: 10 point ROS neg other than the symptoms noted above in the HPI.    /62 (BP Location: Right arm, Cuff Size: Adult Regular)  Pulse 69  Temp 97.2  F (36.2  C) (Tympanic)  Ht 5' 3\" (1.6 m)  Wt 165 lb (74.8 kg)  BMI 29.23 kg/m2    General Appearance:  healthy, alert, active and no distress  Head: Normocephalic. No masses, lesions, tenderness or abnormalities  Eyes: conjuctiva clear, PERRL, EOM intact  Ears: External ears normal. Canals with cerumen.   Nose: Nares normal  Mouth: normal  Neck: Supple, no cervical adenopathy, no thyromegaly, Full range of motion in all planes  The lips appear normal and without lesion.  The dentition is  in good condition  The patient has a normal appearing and functioning hard and soft palate without bifid uvula or submucosal cleft.  The tongue is normal in appearance without erosive lesion or fungating mass.  The oral mucosa is soft and normal in appearance.  The patient also has a soft floor of mouth and base of tongue.  The tonsils are 2.    There is tenderness of left oral palpation. Palpable TMJ click.      The ear canals were examined underneath the operating microscope and with an otologic speculum.  The canals were cleaned of all debris with gently suctioning and use of alligator forceps.   There is no granulation tissue or sign of cholesteatoma.  The patient tolerates this well and has a brief amount of dizziness. TM's are intact without effusion or retraction.        ASSESSMENT:    ICD-10-CM    1. Bilateral impacted cerumen H61.23    2. TMJ (temporomandibular joint syndrome) M26.609    3. Referred otalgia of left ear H92.02      Ears look good.   There is no fluid/ infection  I suspect your ear pain is related to " TMJ  Follow TMJ precautions.   Return to dental guard use      TMJ disease can usually be managed conservatively.  Recommendations include resting the muscles and joints by eating soft foods, not chewing gum, avoiding clenching or tensing the jaw, and relaxing muscles with moist heat for 1/2 hour at least, twice daily.  Physical therapy can be helpful as well as non-steroidal anti-inflammatory drugs.  Oral splints or mouth guards are often necessary.  If these steps are not helpful an oral surgeon may need to be contacted.    Sarah Cisneros PA-C  ENT  [facility]  [phone]

## 2017-07-14 NOTE — PATIENT INSTRUCTIONS
Ears look good.   There is no fluid/ infection  I suspect your ear pain is related to TMJ  Follow TMJ precautions.   Return to dental guard use    If there are concerns or questions, Call [phone] and ask for nurse    Based on today's history and physical exam, I can find no evidence of middle ear pathology or eustachian tube dysfunction.  At this point my primary diagnosis is of temporomandibular syndrome.  I have discussed the etiology of TMJ, and the reasons why referred pain can mimic symptoms of ear disease, headaches, and even sinusitis.  i have given the patient an instructional sheet of things to be tried at home, as well a referral to a TMJ specialist should it be needed.  Finally, I counseled the patient that should the therapy not help, or should the symptoms change, that they should return to me.

## 2017-07-20 DIAGNOSIS — G47.00 PERSISTENT INSOMNIA: ICD-10-CM

## 2017-07-21 NOTE — TELEPHONE ENCOUNTER
ambien      Last Written Prescription Date: 6/12/17  Last Fill Quantity: 30,  # refills: 0   Last Office Visit with G, [facility] or [facility] prescribing provider: 3/28/17

## 2017-07-24 RX ORDER — ZOLPIDEM TARTRATE 10 MG/1
TABLET ORAL
Qty: 30 TABLET | Refills: 0 | Status: SHIPPED | OUTPATIENT
Start: 2017-07-24 | End: 2017-09-10

## 2017-08-02 DIAGNOSIS — F32.A DEPRESSION: ICD-10-CM

## 2017-08-02 NOTE — TELEPHONE ENCOUNTER
Celexa     Last Written Prescription Date: 04/24/2017  Last Fill Quantity: 90, # refills: 0  Last Office Visit with G primary care provider:  07/14/2017        Last PHQ-9 score on record=   PHQ-9 SCORE 9/16/2016   Total Score -   Total Score 17

## 2017-08-07 RX ORDER — CITALOPRAM HYDROBROMIDE 20 MG/1
TABLET ORAL
Qty: 90 TABLET | Refills: 0 | Status: SHIPPED | OUTPATIENT
Start: 2017-08-07 | End: 2017-11-13

## 2017-08-08 ENCOUNTER — OFFICE VISIT (OUTPATIENT)
Dept: FAMILY MEDICINE | Facility: OTHER | Age: 62
End: 2017-08-08
Attending: FAMILY MEDICINE
Payer: MEDICARE

## 2017-08-08 VITALS
HEIGHT: 63 IN | DIASTOLIC BLOOD PRESSURE: 56 MMHG | BODY MASS INDEX: 30.12 KG/M2 | SYSTOLIC BLOOD PRESSURE: 106 MMHG | TEMPERATURE: 98.1 F | WEIGHT: 170 LBS | HEART RATE: 54 BPM

## 2017-08-08 DIAGNOSIS — G40.001 PARTIAL IDIOPATHIC EPILEPSY WITH SEIZURES OF LOCALIZED ONSET, NOT INTRACTABLE, WITH STATUS EPILEPTICUS (H): Primary | ICD-10-CM

## 2017-08-08 PROCEDURE — 99212 OFFICE O/P EST SF 10 MIN: CPT

## 2017-08-08 PROCEDURE — 99214 OFFICE O/P EST MOD 30 MIN: CPT | Performed by: FAMILY MEDICINE

## 2017-08-08 RX ORDER — LEVETIRACETAM 500 MG/1
500 TABLET ORAL 2 TIMES DAILY
Qty: 60 TABLET | Refills: 0 | Status: SHIPPED | OUTPATIENT
Start: 2017-08-08 | End: 2017-10-10 | Stop reason: SINTOL

## 2017-08-08 ASSESSMENT — ANXIETY QUESTIONNAIRES
7. FEELING AFRAID AS IF SOMETHING AWFUL MIGHT HAPPEN: NOT AT ALL
3. WORRYING TOO MUCH ABOUT DIFFERENT THINGS: NOT AT ALL
1. FEELING NERVOUS, ANXIOUS, OR ON EDGE: NOT AT ALL
4. TROUBLE RELAXING: NOT AT ALL
6. BECOMING EASILY ANNOYED OR IRRITABLE: NOT AT ALL
2. NOT BEING ABLE TO STOP OR CONTROL WORRYING: NOT AT ALL
5. BEING SO RESTLESS THAT IT IS HARD TO SIT STILL: NOT AT ALL
GAD7 TOTAL SCORE: 0

## 2017-08-08 ASSESSMENT — PATIENT HEALTH QUESTIONNAIRE - PHQ9: SUM OF ALL RESPONSES TO PHQ QUESTIONS 1-9: 0

## 2017-08-08 ASSESSMENT — PAIN SCALES - GENERAL: PAINLEVEL: NO PAIN (0)

## 2017-08-08 NOTE — MR AVS SNAPSHOT
After Visit Summary   8/8/2017    Mary Pemberton    MRN: [id number]           Patient Information     Date Of Birth          1955        Visit Information        Provider Department      8/8/2017 11:00 AM [facility]        Today's Diagnoses     Partial idiopathic epilepsy with seizures of localized onset, not intractable, with status epilepticus (H)    -  1      Care Instructions    Take Keppra as prescribed.  Appointment with Neurology scheduled for evaluation and recommendations for further management.           Follow-ups after your visit        Additional Services     NEUROLOGY ADULT REFERRAL       Your provider has referred you for the following:   Dr Morse Neurology    Please be aware that coverage of these services is subject to the terms and limitations of your health insurance plan.  Call member services at your health plan with any benefit or coverage questions.      Please bring the following with you to your appointment:    (1) Any X-Rays, CTs or MRIs which have been performed.  Contact the facility where they were done to arrange for  prior to your scheduled appointment.    (2) List of current medications  (3) This referral request   (4) Any documents/labs given to you for this referral                  Follow-up notes from your care team     Return in about 2 weeks (around 8/22/2017) for Follow Up Visit.      Who to contact     If you have questions or need follow up information about today's clinic visit or your schedule please contact [place] directly at [phone].  Normal or non-critical lab and imaging results will be communicated to you by MyChart, letter or phone within 4 business days after the clinic has received the results. If you do not hear from us within 7 days, please contact the clinic through MyChart or phone. If you have a critical or abnormal lab result, we will notify you by phone as soon as  "possible.  Submit refill requests through [employer] or call your pharmacy and they will forward the refill request to us. Please allow 3 business days for your refill to be completed.          Additional Information About Your Visit        [employer] Information     [employer] lets you send messages to your doctor, view your test results, renew your prescriptions, schedule appointments and more. To sign up, go to [email or web address] . Click on \"Log in\" on the left side of the screen, which will take you to the Welcome page. Then click on \"Sign up Now\" on the right side of the page.     You will be asked to enter the access code listed below, as well as some personal information. Please follow the directions to create your username and password.     Your access code is: [email or web address]  Expires: 10/12/2017 11:20 AM     Your access code will  in 90 days. If you need help or a new code, please call your [place] clinic or [phone].        Care EveryWhere ID     This is your Care EveryWhere ID. This could be used by other organizations to access your [place] medical records  [id number]        Your Vitals Were     Pulse Temperature Height BMI (Body Mass Index)          54 98.1  F (36.7  C) 5' 3\" (1.6 m) 30.11 kg/m2         Blood Pressure from Last 3 Encounters:   17 106/56   17 100/62   17 111/64    Weight from Last 3 Encounters:   17 170 lb (77.1 kg)   17 165 lb (74.8 kg)   17 170 lb (77.1 kg)              We Performed the Following     NEUROLOGY ADULT REFERRAL          Today's Medication Changes          These changes are accurate as of: 17 11:59 PM.  If you have any questions, ask your nurse or doctor.               Start taking these medicines.        Dose/Directions    levETIRAcetam 500 MG tablet   Commonly known as:  KEPPRA   Used for:  Partial idiopathic epilepsy with seizures of localized onset, not intractable, with status epilepticus (H)   Started by:  Flores, " MD Sam        Dose:  500 mg   Take 1 tablet (500 mg) by mouth 2 times daily   Quantity:  60 tablet   Refills:  0            Where to get your medicines      These medications were sent to [id number] [address] [address]   [address] , [place] [address]     Phone:  [phone]     levETIRAcetam 500 MG tablet                Primary Care Provider Office Phone # Fax #    Sam Tanner [phone]       [facility] [place] [facility] [address]  [place] [address]        Equal Access to Services     [facility]: Hadii aad ku hadasho Soomaali, waaxda luqadaha, qaybta kaalmada adeegyada, kelly sutherland . So [facility] [phone].    ATENCIÓN: Si habla español, tiene a mckinnon disposición servicios gratuitos de asistencia lingüística. [facility] [phone].    We comply with applicable federal civil rights laws and [place] laws. We do not discriminate on the basis of race, color, national origin, age, disability sex, sexual orientation or gender identity.            Thank you!     Thank you for choosing [facility]  for your care. Our goal is always to provide you with excellent care. Hearing back from our patients is one way we can continue to improve our services. Please take a few minutes to complete the written survey that you may receive in the mail after your visit with us. Thank you!             Your Updated Medication List - Protect others around you: Learn how to safely use, store and throw away your medicines at www.disposemymeds.org.          This list is accurate as of: 8/8/17 11:59 PM.  Always use your most recent med list.                   Brand Name Dispense Instructions for use Diagnosis    ASPIRIN ADULT LOW STRENGTH 81 MG chewable tablet   Generic drug:  aspirin     36 tablet    Take 162 mg by mouth daily    CVA (cerebral infarction)       citalopram 20 MG tablet    celeXA    90 tablet    TAKE ONE TABLET BY MOUTH ONCE DAILY    Depression        cyanocolbalamin 100 MCG tablet    vitamin  B-12     Take 50 mcg by mouth daily.        donepezil 10 MG tablet    ARICEPT    90 tablet    TAKE ONE TABLET BY MOUTH AT BEDTIME    Senile dementia       levETIRAcetam 500 MG tablet    KEPPRA    60 tablet    Take 1 tablet (500 mg) by mouth 2 times daily    Partial idiopathic epilepsy with seizures of localized onset, not intractable, with status epilepticus (H)       LORazepam 0.5 MG tablet    ATIVAN    60 tablet    TAKE ONE TABLET BY MOUTH TWICE DAILY AS NEEDED FOR ANXIETY    Anxiety       mometasone 0.1 % cream    ELOCON    15 g    Apply sparingly to affected area twice daily as needed.  Do not apply to face.    Intrinsic eczema       OMEGA-3 FISH OIL PO      Take 1 capsule by mouth daily        pantoprazole 40 MG EC tablet    PROTONIX    90 tablet    TAKE ONE TABLET BY MOUTH ONCE DAILY    Esophageal reflux       simvastatin 40 MG tablet    ZOCOR    90 tablet    TAKE ONE TABLET BY MOUTH AT BEDTIME    Hyperlipidemia LDL goal <160       zolpidem 10 MG tablet    AMBIEN    30 tablet    TAKE ONE TABLET BY MOUTH AT BEDTIME AS NEEDED FOR SLEEP    Persistent insomnia

## 2017-08-08 NOTE — NURSING NOTE
"Chief Complaint   Patient presents with     Seizures       Initial /56  Pulse 54  Temp 98.1  F (36.7  C)  Ht 5' 3\" (1.6 m)  Wt 170 lb (77.1 kg)  BMI 30.11 kg/m2 Estimated body mass index is 30.11 kg/(m^2) as calculated from the following:    Height as of this encounter: 5' 3\" (1.6 m).    Weight as of this encounter: 170 lb (77.1 kg).  Medication Reconciliation: complete     Erwin Louis      "

## 2017-08-08 NOTE — PROGRESS NOTES
SUBJECTIVE:  Mary Pemberton, 62 year old, female is seen with recurrent seizure activity.  She developed a 2 hour episode of relative unresponsiveness, mouth twitching, and aphasia. 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  Mary has a history of stage 4 malignant melanoma.  It was elected to not start antiepileptic medication at that time.  Extensive records are reviewed.    Denies fever, headache, visual disturbance, dizziness, focal weakness, numbness, tingling, paresthesias, tremor, or gait disturbance.      .    Current Outpatient Prescriptions   Medication Sig Dispense Refill     citalopram (CELEXA) 20 MG tablet TAKE ONE TABLET BY MOUTH ONCE DAILY 90 tablet 0     zolpidem (AMBIEN) 10 MG tablet TAKE ONE TABLET BY MOUTH AT BEDTIME AS NEEDED FOR SLEEP 30 tablet 0     simvastatin (ZOCOR) 40 MG tablet TAKE ONE TABLET BY MOUTH AT BEDTIME 90 tablet 2     donepezil (ARICEPT) 10 MG tablet TAKE ONE TABLET BY MOUTH AT BEDTIME 90 tablet 1     mometasone (ELOCON) 0.1 % cream Apply sparingly to affected area twice daily as needed.  Do not apply to face. 15 g 0     pantoprazole (PROTONIX) 40 MG EC tablet TAKE ONE TABLET BY MOUTH ONCE DAILY 90 tablet 2     LORazepam (ATIVAN) 0.5 MG tablet TAKE ONE TABLET BY MOUTH TWICE DAILY AS NEEDED FOR ANXIETY 60 tablet 0     Omega-3 Fatty Acids (OMEGA-3 FISH OIL PO) Take 1 capsule by mouth daily        aspirin (ASPIRIN ADULT LOW STRENGTH) 81 MG chewable tablet Take 162 mg by mouth daily  36 tablet      cyanocolbalamin (VITAMIN  B-12) 100 MCG tablet Take 50 mcg by mouth daily.          Allergies   Allergen Reactions     Codeine Nausea and Vomiting     Codeine Phosphate      Quinolones      Pt intolerance to steroids also       Past Medical History:   Diagnosis Date     Atrial fibrillation (H)     with Bradycardia     CAD (coronary  atherosclerotic disease) 11/14/2012     CVA (cerebral infarction) 04/28/2015    with TPA     Dementia 10/19/2012     Depressive disorder, not elsewhere classified 4/22/2008     GERD (gastroesophageal reflux disease) 11/14/2012     H/O: stroke 4/28/2015     Localized superficial swelling, mass, or lump 9/9/2004     Lump or mass in breast 1/3/2000     Malignant melanoma of skin of upper limb, including shoulder (H) 9/4/2001     Melanoma of skin (H) 4/16/2002     Problem list name updated by automated process. Provider to review     Melanoma of skin, site unspecified 4/16/2002     Other and unspecified hyperlipidemia 9/12/2002     Other specified episodic mood disorder 8/16/2002     Pre-diabetes 9/13/2011     Rash and other nonspecific skin eruption 11/7/2005     Past Surgical History:   Procedure Laterality Date     arm      LT     brain bx       COLONOSCOPY  [id number]     open heart       Family History   Problem Relation Age of Onset     CANCER Father      lung     Hypertension Mother      CANCER Maternal Grandfather      Social History     Social History     Marital status:      Spouse name: N/A     Number of children: N/A     Years of education: N/A     Occupational History      Retired      Disabled     Social History Main Topics     Smoking status: Former Smoker     Packs/day: 1.00     Years: 20.00     Types: Cigarettes     Quit date: 6/6/2002     Smokeless tobacco: Never Used      Comment: year quit 2001; no passive exposure.     Alcohol use No     Drug use: No     Sexual activity: Not on file     Other Topics Concern     Blood Transfusions Yes     Caffeine Concern Yes     coffee - 2 cups daily     Exercise Yes     walking daily     Parent/Sibling W/ Cabg, Mi Or Angioplasty Before 65f 55m? No     Social History Narrative         Review Of Systems  Constitutional, HEENT, cardiovascular, pulmonary, gi and gu systems are negative, except as otherwise noted.      OBJECTIVE:/56  Pulse 54  Temp 98.1  F  "(36.7  C)  Ht 5' 3\" (1.6 m)  Wt 170 lb (77.1 kg)  BMI 30.11 kg/m2      Exam:  Physical Exam   Constitutional: She is oriented to person, place, and time. She appears well-developed and well-nourished. No distress.   Neurological: She is alert and oriented to person, place, and time. She has normal reflexes. She displays no tremor. No cranial nerve deficit or sensory deficit. She exhibits normal muscle tone. She displays a negative Romberg sign. She displays no seizure activity.   Psychiatric: She has a normal mood and affect.     Other exam not repeated    Labs:  Admission on 04/01/2017, Discharged on 04/01/2017   Component Date Value Ref Range Status     Glucose 04/01/2017 106* 70 - 99 mg/dL Final       ASSESSMENT/PLAN:  Partial idiopathic epilepsy with seizures of localized onset, not intractable, with status epilepticus (H)  Long discussion was held.  Begin Keppra as written.  Warning symptoms discussed.  Appointment with Neurology scheduled for evaluation and recommendations for further management. Follow up 2 weeks.  - levETIRAcetam (KEPPRA) 500 MG tablet; Take 1 tablet (500 mg) by mouth 2 times daily  - NEUROLOGY ADULT REFERRAL            Sam Tanner MD    "

## 2017-08-09 ASSESSMENT — ANXIETY QUESTIONNAIRES: GAD7 TOTAL SCORE: 0

## 2017-08-12 ENCOUNTER — HEALTH MAINTENANCE LETTER (OUTPATIENT)
Age: 62
End: 2017-08-12

## 2017-08-13 NOTE — PATIENT INSTRUCTIONS
Take Keppra as prescribed.  Appointment with Neurology scheduled for evaluation and recommendations for further management.

## 2017-08-16 ENCOUNTER — TELEPHONE (OUTPATIENT)
Dept: FAMILY MEDICINE | Facility: OTHER | Age: 62
End: 2017-08-16

## 2017-08-16 NOTE — TELEPHONE ENCOUNTER
Called patient back in regards to this message, she takes Keppra 500 mg BID and in the morning she gets a headache and by mid afternoon she has indigestion. Wondering what to do?

## 2017-08-16 NOTE — TELEPHONE ENCOUNTER
4:28 PM    Reason for Call: Phone Call    Description: Mary is having trouble with the new seizure medication. Should she stop taking them. Please call Mary to advise    Was an appointment offered for this call?  No  If yes : Appointment type              Date    Preferred method for responding to this message: Phone  What is your phone number [phone]      Sarahi Mcgovern

## 2017-08-17 NOTE — TELEPHONE ENCOUNTER
Called and notified her to cut her Keppra in half in the morning and evening. Pt verbalized understanding.

## 2017-09-06 ENCOUNTER — TRANSFERRED RECORDS (OUTPATIENT)
Dept: HEALTH INFORMATION MANAGEMENT | Facility: HOSPITAL | Age: 62
End: 2017-09-06

## 2017-09-10 DIAGNOSIS — G47.00 PERSISTENT INSOMNIA: ICD-10-CM

## 2017-09-11 RX ORDER — ZOLPIDEM TARTRATE 10 MG/1
TABLET ORAL
Qty: 30 TABLET | Refills: 0 | Status: SHIPPED | OUTPATIENT
Start: 2017-09-11 | End: 2017-10-30

## 2017-09-20 ENCOUNTER — TELEPHONE (OUTPATIENT)
Dept: FAMILY MEDICINE | Facility: OTHER | Age: 62
End: 2017-09-20

## 2017-09-20 NOTE — TELEPHONE ENCOUNTER
10:15 AM    Reason for Call: Phone Call    Description: Patient called regarding her seizures and she said she was told to watch for a rash which she has now developed.     Was an appointment offered for this call? Yes  If yes : Appointment type              Date    Preferred method for responding to this message: Telephone Call  What is your phone number ?[phone]    If we cannot reach you directly, may we leave a detailed response at the number you provided? Yes    Can this message wait until your PCP/provider returns, if available today? PCP is in    Tyra Awad

## 2017-09-20 NOTE — TELEPHONE ENCOUNTER
Called patient and told to make appointment. She was going to call and speak with neurologist first.  Bridget Cormier

## 2017-10-04 ENCOUNTER — OFFICE VISIT (OUTPATIENT)
Dept: FAMILY MEDICINE | Facility: OTHER | Age: 62
End: 2017-10-04
Attending: FAMILY MEDICINE
Payer: MEDICARE

## 2017-10-04 VITALS
TEMPERATURE: 98.8 F | HEART RATE: 70 BPM | SYSTOLIC BLOOD PRESSURE: 100 MMHG | DIASTOLIC BLOOD PRESSURE: 60 MMHG | WEIGHT: 172 LBS | OXYGEN SATURATION: 99 % | BODY MASS INDEX: 30.47 KG/M2

## 2017-10-04 DIAGNOSIS — E66.09 CLASS 1 OBESITY DUE TO EXCESS CALORIES WITH SERIOUS COMORBIDITY AND BODY MASS INDEX (BMI) OF 30.0 TO 30.9 IN ADULT: ICD-10-CM

## 2017-10-04 DIAGNOSIS — J20.9 ACUTE BRONCHITIS, UNSPECIFIED ORGANISM: Primary | ICD-10-CM

## 2017-10-04 DIAGNOSIS — E66.811 CLASS 1 OBESITY DUE TO EXCESS CALORIES WITH SERIOUS COMORBIDITY AND BODY MASS INDEX (BMI) OF 30.0 TO 30.9 IN ADULT: ICD-10-CM

## 2017-10-04 DIAGNOSIS — G40.001 PARTIAL IDIOPATHIC EPILEPSY WITH SEIZURES OF LOCALIZED ONSET, NOT INTRACTABLE, WITH STATUS EPILEPTICUS (H): ICD-10-CM

## 2017-10-04 PROCEDURE — 99214 OFFICE O/P EST MOD 30 MIN: CPT | Performed by: FAMILY MEDICINE

## 2017-10-04 PROCEDURE — 99212 OFFICE O/P EST SF 10 MIN: CPT

## 2017-10-04 RX ORDER — CEFPROZIL 250 MG/1
250 TABLET, FILM COATED ORAL 2 TIMES DAILY
Qty: 20 TABLET | Refills: 0 | Status: SHIPPED | OUTPATIENT
Start: 2017-10-04 | End: 2017-10-17

## 2017-10-04 ASSESSMENT — ANXIETY QUESTIONNAIRES
1. FEELING NERVOUS, ANXIOUS, OR ON EDGE: NOT AT ALL
5. BEING SO RESTLESS THAT IT IS HARD TO SIT STILL: NOT AT ALL
7. FEELING AFRAID AS IF SOMETHING AWFUL MIGHT HAPPEN: NOT AT ALL
6. BECOMING EASILY ANNOYED OR IRRITABLE: NOT AT ALL
GAD7 TOTAL SCORE: 0
2. NOT BEING ABLE TO STOP OR CONTROL WORRYING: NOT AT ALL
3. WORRYING TOO MUCH ABOUT DIFFERENT THINGS: NOT AT ALL
4. TROUBLE RELAXING: NOT AT ALL

## 2017-10-04 ASSESSMENT — PAIN SCALES - GENERAL: PAINLEVEL: MILD PAIN (3)

## 2017-10-04 ASSESSMENT — PATIENT HEALTH QUESTIONNAIRE - PHQ9: SUM OF ALL RESPONSES TO PHQ QUESTIONS 1-9: 6

## 2017-10-04 NOTE — PROGRESS NOTES
SUBJECTIVE:  Mary Pemberton, 62 year old, female is seen with recurrent seizure activity.  She developed a 2 hour episode of relative unresponsiveness, mouth twitching, and aphasia. These are similar to her previous episodes where she was transferred to the 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  Mary has a history of stage 4 malignant melanoma.  It was elected to not start antiepileptic medication at that time. Since then she was started on Keppra, however, she was unable to tolerate and this as switched to Lamictal.  Mary notes no further seizure activity.     In addition, she notes upper respiratory congestion and cough.  Present over the last week.  Productive of purulent sputum.    Obesity care package to be completed.    Denies fever, shaking, chills, nausea, vomiting, or diarrhea.  No household contacts are ill.      Denies fever, headache, visual disturbance, dizziness, focal weakness, numbness, tingling, paresthesias, tremor, or gait disturbance.      .    Current Outpatient Prescriptions   Medication Sig Dispense Refill     zolpidem (AMBIEN) 10 MG tablet TAKE ONE TABLET BY MOUTH AT BEDTIME AS NEEDED FOR SLEEP 30 tablet 0     levETIRAcetam (KEPPRA) 500 MG tablet Take 1 tablet (500 mg) by mouth 2 times daily 60 tablet 0     citalopram (CELEXA) 20 MG tablet TAKE ONE TABLET BY MOUTH ONCE DAILY 90 tablet 0     simvastatin (ZOCOR) 40 MG tablet TAKE ONE TABLET BY MOUTH AT BEDTIME 90 tablet 2     donepezil (ARICEPT) 10 MG tablet TAKE ONE TABLET BY MOUTH AT BEDTIME 90 tablet 1     mometasone (ELOCON) 0.1 % cream Apply sparingly to affected area twice daily as needed.  Do not apply to face. 15 g 0     pantoprazole (PROTONIX) 40 MG EC tablet TAKE ONE TABLET BY MOUTH ONCE DAILY 90 tablet 2     LORazepam (ATIVAN) 0.5 MG tablet TAKE ONE TABLET BY MOUTH TWICE DAILY AS NEEDED FOR ANXIETY 60 tablet 0     Omega-3 Fatty Acids (OMEGA-3 FISH OIL PO)  Take 1 capsule by mouth daily        aspirin (ASPIRIN ADULT LOW STRENGTH) 81 MG chewable tablet Take 162 mg by mouth daily  36 tablet      cyanocolbalamin (VITAMIN  B-12) 100 MCG tablet Take 50 mcg by mouth daily.          Allergies   Allergen Reactions     Codeine Nausea and Vomiting     Codeine Phosphate      Quinolones      Pt intolerance to steroids also       Past Medical History:   Diagnosis Date     Atrial fibrillation (H)     with Bradycardia     CAD (coronary atherosclerotic disease) 11/14/2012     CVA (cerebral infarction) 04/28/2015    with TPA     Dementia 10/19/2012     Depressive disorder, not elsewhere classified 4/22/2008     GERD (gastroesophageal reflux disease) 11/14/2012     H/O: stroke 4/28/2015     Localized superficial swelling, mass, or lump 9/9/2004     Lump or mass in breast 1/3/2000     Malignant melanoma of skin of upper limb, including shoulder (H) 9/4/2001     Melanoma of skin (H) 4/16/2002     Problem list name updated by automated process. Provider to review     Melanoma of skin, site unspecified 4/16/2002     Other and unspecified hyperlipidemia 9/12/2002     Other specified episodic mood disorder 8/16/2002     Pre-diabetes 9/13/2011     Rash and other nonspecific skin eruption 11/7/2005     Past Surgical History:   Procedure Laterality Date     arm      LT     brain bx       COLONOSCOPY  [id number]     open heart       Family History   Problem Relation Age of Onset     CANCER Father      lung     Hypertension Mother      CANCER Maternal Grandfather      Social History     Social History     Marital status:      Spouse name: N/A     Number of children: N/A     Years of education: N/A     Occupational History      Retired      Disabled     Social History Main Topics     Smoking status: Former Smoker     Packs/day: 1.00     Years: 20.00     Types: Cigarettes     Quit date: 6/6/2002     Smokeless tobacco: Never Used      Comment: year quit 2001; no passive exposure.     Alcohol use  No     Drug use: No     Sexual activity: Not on file     Other Topics Concern     Blood Transfusions Yes     Caffeine Concern Yes     coffee - 2 cups daily     Exercise Yes     walking daily     Parent/Sibling W/ Cabg, Mi Or Angioplasty Before 65f 55m? No     Social History Narrative         Review Of Systems  Constitutional, HEENT, cardiovascular, pulmonary, gi and gu systems are negative, except as otherwise noted.      OBJECTIVE:/60 (BP Location: Left arm, Patient Position: Sitting, Cuff Size: Adult Large)  Pulse 70  Temp 98.8  F (37.1  C) (Tympanic)  Wt 172 lb (78 kg)  SpO2 99%  BMI 30.47 kg/m2      Exam:  Physical Exam   Constitutional: She is oriented to person, place, and time. She appears well-developed and well-nourished. No distress.   HENT:   Head: Normocephalic.   Right Ear: Hearing, tympanic membrane, external ear and ear canal normal.   Left Ear: Hearing, tympanic membrane, external ear and ear canal normal.   Nose: Nose normal. Right sinus exhibits no maxillary sinus tenderness and no frontal sinus tenderness. Left sinus exhibits no maxillary sinus tenderness and no frontal sinus tenderness.   Mouth/Throat: Uvula is midline and oropharynx is clear and moist. No oropharyngeal exudate or posterior oropharyngeal erythema.   Eyes: Conjunctivae are normal.   Neck: Neck supple.   Pulmonary/Chest: Effort normal and breath sounds normal.   Lymphadenopathy:     She has no cervical adenopathy.   Neurological: She is alert and oriented to person, place, and time. She has normal reflexes. She displays no tremor. No cranial nerve deficit or sensory deficit. She exhibits normal muscle tone. She displays a negative Romberg sign. She displays no seizure activity.   Skin: Skin is warm and dry.   Psychiatric: She has a normal mood and affect.     Other exam not repeated    Labs:  Admission on 04/01/2017, Discharged on 04/01/2017   Component Date Value Ref Range Status     Glucose 04/01/2017 106* 70 - 99  mg/dL Final       ASSESSMENT/PLAN:  Partial idiopathic epilepsy with seizures of localized onset, not intractable, with status epilepticus (H)  Neurology notes reviewed.  Will continue Lamictal as prescribed.      Acute bronchitis, unspecified organism  Cefzil twice daily.  Continue over the counter cough suppressants and expectorants.  Follow up with persistent symptoms.  - cefPROZIL (CEFZIL) 250 MG tablet; Take 1 tablet (250 mg) by mouth 2 times daily    Class 1 obesity due to excess calories with serious comorbidity and body mass index (BMI) of 30.0 to 30.9 in adult  Weight management plan: Discussed healthy diet and exercise guidelines and patient will follow up in 12 months in clinic to re-evaluate.            Sam Tanner MD

## 2017-10-04 NOTE — MR AVS SNAPSHOT
"              After Visit Summary   10/4/2017    Mary Pemberton    MRN: [id number]           Patient Information     Date Of Birth          1955        Visit Information        Provider Department      10/4/2017 10:40 AM Sam Tanner MD [facility]        Today's Diagnoses     Acute bronchitis, unspecified organism    -  1    Partial idiopathic epilepsy with seizures of localized onset, not intractable, with status epilepticus (H)        Class 1 obesity due to excess calories with serious comorbidity and body mass index (BMI) of 30.0 to 30.9 in adult          Care Instructions      Understanding Body Mass Index (BMI)  Body mass index (BMI) is a method of screening for a weight category using the ratio of your height to your weight. The BMI is a measure of overweight that is corrected for height. Knowing your BMI is a way to tell if you are at a healthy weight. The higher your BMI, the greater your risk for weight-related health problems.  What BMI means    BMI below 18.5: Underweight    BMI 18.5 to 24.9: Healthy weight or \"ideal body weight\"     BMI 25 to 29.9: Overweight    BMI 30 and over: Obese    BMI 40 and over: Severe obesity   Online BMI Calculators  Find your BMI with an online BMI calculator tool, such as these from the CDC:    BMI calculator for adults    BMI calculator for children and teens   Using the BMI chart  To figure out your BMI, find your height and weight (or the numbers closest to them) on the table below. Follow each column of numbers to where your height and weight meet on the table. That is your BMI.    Date Last Reviewed: 7/1/2016 2000-2017 The [employer]. [address] [place] [address], [place] [address]. All rights reserved. This information is not intended as a substitute for professional medical care. Always follow your healthcare professional's instructions.        Weight Management: Exercise and Activity    Studies show that people who exercise are the most " likely to lose weight and keep it off. Exercise burns calories. It helps build muscle to make your body stronger. Make exercise an important part of your weight-management plan.  Make activity part of your day  You may not think you have the time to exercise. But you can work activity into your daily life--you just need to be committed. Take 10 minutes out of your lunch hour to take a walk. Walk to the [employer] to get your paper instead of having it delivered. Make it a habit to take the stairs instead of the elevator. Park in a far away parking spot instead of the closest. You ll be surprised at how fast these little changes can make a difference.  Some people really cannot walk very far, and tire out quickly with exercise. Instead of becoming discouraged, resolve to do what you can do, and work to make that a regular frequent habit.   The benefits of exercise  Exercise offers many benefits including:     Exercise increases your metabolism (the speed at which your body burns calories).    Regular exercise can increase the amount of muscle in your body. Muscle burns calories faster than fat. The more muscle you have, the more calories you burn.    Exercise gives you energy and curbs your appetite.    Exercise decreases stress and helps you sleep better.  Make exercise fun  Exercise can be fun. Choose an activity you enjoy. You may even get a friend to do it with you:    Take a resistance-training or aerobics class    Join a team sport    Take a dance class    Walk the dog    Ride a bike  If you have health problems, be sure to ask your healthcare provider before you start an exercise program. Have a  help you develop a plan that s safe for you.   Date Last Reviewed: 2/4/2016 2000-2017 The [employer]. [address] [place] [address], [place] [address]. All rights reserved. This information is not intended as a substitute for professional medical care. Always follow your healthcare  professional's instructions.        Weight Management: Fact and Fiction    Knowing the truth about losing weight can help you separate what works from what doesn t. Don t be taken in by expensive weight-loss fads like pills, herbs, and special foods that promise unbelievable results. There s no magic way to lose weight. If you have questions about weight loss, ask your healthcare provider.  Fiction:  The faster I lose weight, the better.   Fact: Rapid weight loss is usually due to loss of water or muscle mass. What you re trying to get rid of is extra fat. Aim to lose a 1/2 pound to 2 pounds a week. Then you re more likely to lose fat rather than water or muscle.  Fiction:  Skipping meals will help me lose weight.   Fact: When you skip meals, you don t give your body the energy it needs to work. Hunger makes you more likely to overeat later on. It s best to spread your meals throughout the day. Eat at least three meals a day.  Fiction:  I can t start exercising until I lose weight.   Fact: The sooner you start exercising the better. Exercise helps burn more calories, tone your muscles, and keep your appetite in check. People who continue to exercise after they lose weight are more likely to keep the weight off.  Fiction:  The fewer calories I eat, the better.   Fact: This seems like it should be true, but it s not. When you eat too few calories, your body acts as if it s on a desert island. It thinks food is scarce, so it slows down your metabolism (how fast you burn calories) to save energy. By eating too few calories, you make it harder to lose weight.  Fiction:  Once I lose weight, I can go back to living the way I did before.   Fact: Going back to your old eating habits and giving up exercise is a sure way to regain any weight you ve lost. The lifestyle changes that help you lose extra weight can also help keep it off. This is why you need to make realistic changes you can stick with.  Fiction:  Low-fat and  fat-free mean low-calorie.   Fact: All foods, even fat-free ones, have calories. Eat too many calories and you ll gain weight. It s OK to treat yourself to a fat-free cookie or two. Just don t eat the whole box! A dietitian will help you figure this out, and will likely recommend that you eat three meals a day, with protein with each meal.   Date Last Reviewed: 2/4/2016 2000-2017 The [employer]. [address] [place] [address]. All rights reserved. This information is not intended as a substitute for professional medical care. Always follow your healthcare professional's instructions.        Weight Management: Healthy Eating  Food is your body s fuel. You can t live without it. The key is to give your body enough nutrients and energy without eating too much. Reading food labels can help you make healthy choices. Also, learn new eating habits to manage your weight.     All the values on the label are based on one serving. The serving size is the average portion. Remember to multiply the values on the label by the number of servings you eat.   Eat less fat  A gram of fat has almost 2.5 times the calories of a gram of protein or carbohydrates. Try to balance your food choices so that only 20% to 35% of your calories comes from total fat. This means an average of 2  to 3  grams of fat for each 100 calories you eat.  Eat more fiber  High-fiber foods are digested more slowly than low-fiber foods, so you feel full longer. Try to get at least 25 grams of fiber each day for a 2000 calorie diet. Foods high in fiber include:    Vegetables and fruits    Whole-grain or bran breads, pastas, and cereals    Legumes (beans) and peas  As you begin to eat more fiber, be sure to drink plenty of water to keep your digestive system working smoothly.  Tips  Do's and don'ts include:     Don t skip meals. This often leads to overeating later on. It s best to spread your eating throughout the day.    Eat a variety of  foods, not just a few favorites.    If you find yourself eating when you re not hungry, ask yourself why. Many of us eat when we re bored, stressed, or just to be polite. Listen to your body. If you re not hungry, get busy doing something else instead of eating.    Eat slower, shooting for 20 to 30 minutes for each meal. It takes 20 minutes for your stomach to tell your brain that it s full. Slow eaters tend to eat less and are still satisfied, while fast eaters may tend to be overeaters.     Pay attention to what you eat. Don t read or watch TV during your meal.  Date Last Reviewed: 1/31/2016 2000-2017 [employer]. [address] [place] [address], [place] [address]. All rights reserved. This information is not intended as a substitute for professional medical care. Always follow your healthcare professional's instructions.                Follow-ups after your visit        Follow-up notes from your care team     Return in about 3 months (around 1/4/2018) for Follow Up Visit.      Who to contact     If you have questions or need follow up information about today's clinic visit or your schedule please contact [facility] directly at [phone].  Normal or non-critical lab and imaging results will be communicated to you by MyChart, letter or phone within 4 business days after the clinic has received the results. If you do not hear from us within 7 days, please contact the clinic through [employer] or phone. If you have a critical or abnormal lab result, we will notify you by phone as soon as possible.  Submit refill requests through [employer] or call your pharmacy and they will forward the refill request to us. Please allow 3 business days for your refill to be completed.          Additional Information About Your Visit        [employer] Information     [employer] lets you send messages to your doctor, view your test results, renew your prescriptions, schedule appointments and more. To sign up, go to  [email or web address] . Click on \"Log in\" on the left side of the screen, which will take you to the Welcome page. Then click on \"Sign up Now\" on the right side of the page.     You will be asked to enter the access code listed below, as well as some personal information. Please follow the directions to create your username and password.     Your access code is: [email or web address]  Expires: 10/12/2017 11:20 AM     Your access code will  in 90 days. If you need help or a new code, please call your [facility] clinic or [phone].        Care EveryWhere ID     This is your Care EveryWhere ID. This could be used by other organizations to access your [facility] medical records  [id number]        Your Vitals Were     Pulse Temperature Pulse Oximetry BMI (Body Mass Index)          70 98.8  F (37.1  C) (Tympanic) 99% 30.47 kg/m2         Blood Pressure from Last 3 Encounters:   10/04/17 100/60   17 106/56   17 100/62    Weight from Last 3 Encounters:   10/04/17 172 lb (78 kg)   17 170 lb (77.1 kg)   17 165 lb (74.8 kg)              Today, you had the following     No orders found for display         Today's Medication Changes          These changes are accurate as of: 10/4/17 11:59 PM.  If you have any questions, ask your nurse or doctor.               Start taking these medicines.        Dose/Directions    cefPROZIL 250 MG tablet   Commonly known as:  CEFZIL   Used for:  Acute bronchitis, unspecified organism   Started by:  Sam Tanner MD        Dose:  250 mg   Take 1 tablet (250 mg) by mouth 2 times daily   Quantity:  20 tablet   Refills:  0         Stop taking these medicines if you haven't already. Please contact your care team if you have questions.     levETIRAcetam 500 MG tablet   Commonly known as:  KEPPRA   Stopped by:  Sam Tanner MD                Where to get your medicines      These medications were sent to [facility] - [place], [place] - [address] [place] [address]  [place] [place] [address]     Phone:  [phone]     cefPROZIL 250 MG tablet                Primary Care Provider Office Phone # Fax #    Sam Tanner [phone]       [facility] [address] [place] [address]        Equal Access to Services     [facility] : Hadii aad ku hadasho Soomaali, waaxda luqadaha, qaybta kaalmada adeegyada, waxay idiin hayaan adeeg khclarykeenan laanthony . So [facility] [phone].    ATENCIÓN: Si habla español, tiene a mckinnon disposición servicios gratuitos de asistencia lingüística. [facility] [phone].    We comply with applicable federal civil rights laws and [place] laws. We do not discriminate on the basis of race, color, national origin, age, disability, sex, sexual orientation, or gender identity.            Thank you!     Thank you for choosing [facility]  for your care. Our goal is always to provide you with excellent care. Hearing back from our patients is one way we can continue to improve our services. Please take a few minutes to complete the written survey that you may receive in the mail after your visit with us. Thank you!             Your Updated Medication List - Protect others around you: Learn how to safely use, store and throw away your medicines at www.disposemymeds.org.          This list is accurate as of: 10/4/17 11:59 PM.  Always use your most recent med list.                   Brand Name Dispense Instructions for use Diagnosis    ASPIRIN ADULT LOW STRENGTH 81 MG chewable tablet   Generic drug:  aspirin     36 tablet    Take 162 mg by mouth daily    CVA (cerebral infarction)       cefPROZIL 250 MG tablet    CEFZIL    20 tablet    Take 1 tablet (250 mg) by mouth 2 times daily    Acute bronchitis, unspecified organism       citalopram 20 MG tablet    celeXA    90 tablet    TAKE ONE TABLET BY MOUTH ONCE DAILY    Depression       cyanocolbalamin 100 MCG tablet    vitamin  B-12     Take 50 mcg by mouth daily.        donepezil 10  MG tablet    ARICEPT    90 tablet    TAKE ONE TABLET BY MOUTH AT BEDTIME    Senile dementia       LORazepam 0.5 MG tablet    ATIVAN    60 tablet    TAKE ONE TABLET BY MOUTH TWICE DAILY AS NEEDED FOR ANXIETY    Anxiety       mometasone 0.1 % cream    ELOCON    15 g    Apply sparingly to affected area twice daily as needed.  Do not apply to face.    Intrinsic eczema       OMEGA-3 FISH OIL PO      Take 1 capsule by mouth daily        pantoprazole 40 MG EC tablet    PROTONIX    90 tablet    TAKE ONE TABLET BY MOUTH ONCE DAILY    Esophageal reflux       simvastatin 40 MG tablet    ZOCOR    90 tablet    TAKE ONE TABLET BY MOUTH AT BEDTIME    Hyperlipidemia LDL goal <160       zolpidem 10 MG tablet    AMBIEN    30 tablet    TAKE ONE TABLET BY MOUTH AT BEDTIME AS NEEDED FOR SLEEP    Persistent insomnia

## 2017-10-05 ASSESSMENT — ANXIETY QUESTIONNAIRES: GAD7 TOTAL SCORE: 0

## 2017-10-10 PROBLEM — E66.9 OBESITY: Status: ACTIVE | Noted: 2017-10-10

## 2017-10-10 NOTE — PATIENT INSTRUCTIONS
"  Understanding Body Mass Index (BMI)  Body mass index (BMI) is a method of screening for a weight category using the ratio of your height to your weight. The BMI is a measure of overweight that is corrected for height. Knowing your BMI is a way to tell if you are at a healthy weight. The higher your BMI, the greater your risk for weight-related health problems.  What BMI means    BMI below 18.5: Underweight    BMI 18.5 to 24.9: Healthy weight or \"ideal body weight\"     BMI 25 to 29.9: Overweight    BMI 30 and over: Obese    BMI 40 and over: Severe obesity   Online BMI Calculators  Find your BMI with an online BMI calculator tool, such as these from the CDC:    BMI calculator for adults    BMI calculator for children and teens   Using the BMI chart  To figure out your BMI, find your height and weight (or the numbers closest to them) on the table below. Follow each column of numbers to where your height and weight meet on the table. That is your BMI.    Date Last Reviewed: 7/1/2016 2000-2017 [employer]. [address] [place] [address]. All rights reserved. This information is not intended as a substitute for professional medical care. Always follow your healthcare professional's instructions.        Weight Management: Exercise and Activity    Studies show that people who exercise are the most likely to lose weight and keep it off. Exercise burns calories. It helps build muscle to make your body stronger. Make exercise an important part of your weight-management plan.  Make activity part of your day  You may not think you have the time to exercise. But you can work activity into your daily life you just need to be committed. Take 10 minutes out of your lunch hour to take a walk. Walk to the [employer] to get your paper instead of having it delivered. Make it a habit to take the stairs instead of the elevator. Park in a far away parking spot instead of the closest. You ll be surprised at how " fast these little changes can make a difference.  Some people really cannot walk very far, and tire out quickly with exercise. Instead of becoming discouraged, resolve to do what you can do, and work to make that a regular frequent habit.   The benefits of exercise  Exercise offers many benefits including:     Exercise increases your metabolism (the speed at which your body burns calories).    Regular exercise can increase the amount of muscle in your body. Muscle burns calories faster than fat. The more muscle you have, the more calories you burn.    Exercise gives you energy and curbs your appetite.    Exercise decreases stress and helps you sleep better.  Make exercise fun  Exercise can be fun. Choose an activity you enjoy. You may even get a friend to do it with you:    Take a resistance-training or aerobics class    Join a team sport    Take a dance class    Walk the dog    Ride a bike  If you have health problems, be sure to ask your healthcare provider before you start an exercise program. Have a  help you develop a plan that s safe for you.   Date Last Reviewed: 2/4/2016 2000-2017 The [employer]. [address] [place] [address]. All rights reserved. This information is not intended as a substitute for professional medical care. Always follow your healthcare professional's instructions.        Weight Management: Fact and Fiction    Knowing the truth about losing weight can help you separate what works from what doesn t. Don t be taken in by expensive weight-loss fads like pills, herbs, and special foods that promise unbelievable results. There s no magic way to lose weight. If you have questions about weight loss, ask your healthcare provider.  Fiction:  The faster I lose weight, the better.   Fact: Rapid weight loss is usually due to loss of water or muscle mass. What you re trying to get rid of is extra fat. Aim to lose a 1/2 pound to 2 pounds a week. Then you re  more likely to lose fat rather than water or muscle.  Fiction:  Skipping meals will help me lose weight.   Fact: When you skip meals, you don t give your body the energy it needs to work. Hunger makes you more likely to overeat later on. It s best to spread your meals throughout the day. Eat at least three meals a day.  Fiction:  I can t start exercising until I lose weight.   Fact: The sooner you start exercising the better. Exercise helps burn more calories, tone your muscles, and keep your appetite in check. People who continue to exercise after they lose weight are more likely to keep the weight off.  Fiction:  The fewer calories I eat, the better.   Fact: This seems like it should be true, but it s not. When you eat too few calories, your body acts as if it s on a desert island. It thinks food is scarce, so it slows down your metabolism (how fast you burn calories) to save energy. By eating too few calories, you make it harder to lose weight.  Fiction:  Once I lose weight, I can go back to living the way I did before.   Fact: Going back to your old eating habits and giving up exercise is a sure way to regain any weight you ve lost. The lifestyle changes that help you lose extra weight can also help keep it off. This is why you need to make realistic changes you can stick with.  Fiction:  Low-fat and fat-free mean low-calorie.   Fact: All foods, even fat-free ones, have calories. Eat too many calories and you ll gain weight. It s OK to treat yourself to a fat-free cookie or two. Just don t eat the whole box! A dietitian will help you figure this out, and will likely recommend that you eat three meals a day, with protein with each meal.   Date Last Reviewed: 2/4/2016 2000-2017 The [employer]. [address] [place] [address], [place] [address]. All rights reserved. This information is not intended as a substitute for professional medical care. Always follow your healthcare professional's instructions.         Weight Management: Healthy Eating  Food is your body s fuel. You can t live without it. The key is to give your body enough nutrients and energy without eating too much. Reading food labels can help you make healthy choices. Also, learn new eating habits to manage your weight.     All the values on the label are based on one serving. The serving size is the average portion. Remember to multiply the values on the label by the number of servings you eat.   Eat less fat  A gram of fat has almost 2.5 times the calories of a gram of protein or carbohydrates. Try to balance your food choices so that only 20% to 35% of your calories comes from total fat. This means an average of 2  to 3  grams of fat for each 100 calories you eat.  Eat more fiber  High-fiber foods are digested more slowly than low-fiber foods, so you feel full longer. Try to get at least 25 grams of fiber each day for a 2000 calorie diet. Foods high in fiber include:    Vegetables and fruits    Whole-grain or bran breads, pastas, and cereals    Legumes (beans) and peas  As you begin to eat more fiber, be sure to drink plenty of water to keep your digestive system working smoothly.  Tips  Do's and don'ts include:     Don t skip meals. This often leads to overeating later on. It s best to spread your eating throughout the day.    Eat a variety of foods, not just a few favorites.    If you find yourself eating when you re not hungry, ask yourself why. Many of us eat when we re bored, stressed, or just to be polite. Listen to your body. If you re not hungry, get busy doing something else instead of eating.    Eat slower, shooting for 20 to 30 minutes for each meal. It takes 20 minutes for your stomach to tell your brain that it s full. Slow eaters tend to eat less and are still satisfied, while fast eaters may tend to be overeaters.     Pay attention to what you eat. Don t read or watch TV during your meal.  Date Last Reviewed: 1/31/2016 2000-2017 The  [employer]. [address] [place] [address], [place] [address]. All rights reserved. This information is not intended as a substitute for professional medical care. Always follow your healthcare professional's instructions.

## 2017-10-11 DIAGNOSIS — K21.9 ESOPHAGEAL REFLUX: ICD-10-CM

## 2017-10-11 NOTE — TELEPHONE ENCOUNTER
protonix 40mg      Last Written Prescription Date: 1/4/17  Last Fill Quantity: 90 ,  # refills: 2   Last Office Visit with [facility], [facility] or [facility] prescribing provider: 10/4/17

## 2017-10-13 RX ORDER — PANTOPRAZOLE SODIUM 40 MG/1
TABLET, DELAYED RELEASE ORAL
Qty: 90 TABLET | Refills: 3 | Status: SHIPPED | OUTPATIENT
Start: 2017-10-13 | End: 2018-02-15

## 2017-10-17 ENCOUNTER — TELEPHONE (OUTPATIENT)
Dept: FAMILY MEDICINE | Facility: OTHER | Age: 62
End: 2017-10-17

## 2017-10-17 DIAGNOSIS — J20.9 ACUTE BRONCHITIS, UNSPECIFIED ORGANISM: ICD-10-CM

## 2017-10-17 RX ORDER — CEFPROZIL 250 MG/1
250 TABLET, FILM COATED ORAL 2 TIMES DAILY
Qty: 20 TABLET | Refills: 0 | Status: SHIPPED | OUTPATIENT
Start: 2017-10-17 | End: 2018-02-15

## 2017-10-17 NOTE — TELEPHONE ENCOUNTER
1:24 PM    Reason for Call: Phone Call    Description: Pt called and states that she had gotten cefPROZIL (CEFZIL) 250 MG tablet for her bronchitis and states that she is still sick and would like to know if she can get a refill on this medication.    Was an appointment offered for this call? No  If yes : Appointment type              Date    Preferred method for responding to this message: Telephone Call  What is your phone number ?    If we cannot reach you directly, may we leave a detailed response at the number you provided? Yes    Can this message wait until your PCP/provider returns, if available today? Not applicable,     Kellie Benjamin

## 2017-10-28 DIAGNOSIS — F03.90 SENILE DEMENTIA (H): ICD-10-CM

## 2017-10-30 DIAGNOSIS — G47.00 PERSISTENT INSOMNIA: ICD-10-CM

## 2017-10-30 NOTE — TELEPHONE ENCOUNTER
Ambien      Last Written Prescription Date:  09/11/17  Last Fill Quantity: 30,   # refills: 0  Future Office visit:        Routing refill request to provider for review/approval because:  Drug not on the [facility], [facility] or [facility] refill protocol or controlled substance

## 2017-10-31 RX ORDER — ZOLPIDEM TARTRATE 10 MG/1
TABLET ORAL
Qty: 30 TABLET | Refills: 0 | Status: SHIPPED | OUTPATIENT
Start: 2017-10-31 | End: 2017-11-30

## 2017-11-01 RX ORDER — DONEPEZIL HYDROCHLORIDE 10 MG/1
TABLET, FILM COATED ORAL
Qty: 90 TABLET | Refills: 2 | Status: SHIPPED | OUTPATIENT
Start: 2017-11-01 | End: 2018-02-15

## 2017-11-13 DIAGNOSIS — F32.A DEPRESSION: ICD-10-CM

## 2017-11-14 NOTE — TELEPHONE ENCOUNTER
Celexa     Last Written Prescription Date: 8/7/17  Last Fill Quantity: 90, # refills: 0  Last Office Visit with [facility] primary care provider:  10/4/17        Last PHQ-9 score on record=   PHQ-9 SCORE 10/4/2017   Total Score -   Total Score 6

## 2017-11-16 RX ORDER — CITALOPRAM HYDROBROMIDE 20 MG/1
TABLET ORAL
Qty: 90 TABLET | Refills: 1 | Status: SHIPPED | OUTPATIENT
Start: 2017-11-16 | End: 2018-02-15

## 2017-11-30 DIAGNOSIS — G47.00 PERSISTENT INSOMNIA: ICD-10-CM

## 2017-11-30 NOTE — TELEPHONE ENCOUNTER
Ambien      Last Written Prescription Date: 10/31/17  Last Fill Quantity: 30,  # refills: 0   Last Office Visit with [facility], [facility] or [facility] prescribing provider: 10/4/17

## 2017-12-01 ENCOUNTER — TRANSFERRED RECORDS (OUTPATIENT)
Dept: HEALTH INFORMATION MANAGEMENT | Facility: HOSPITAL | Age: 62
End: 2017-12-01

## 2017-12-01 RX ORDER — ZOLPIDEM TARTRATE 10 MG/1
TABLET ORAL
Qty: 30 TABLET | Refills: 0 | Status: SHIPPED | OUTPATIENT
Start: 2017-12-01 | End: 2018-01-29

## 2018-01-01 NOTE — PHARMACY-ADMISSION MEDICATION HISTORY
Admission medication history interview status for the 3/17/2017 admission is complete. See Epic admission navigator for allergy information, pharmacy, prior to admission medications and immunization status.     Medication history interview sources:  patient interview and chart review    Changes made to PTA medication list (reason)  Added: None  Deleted: oxybutynin chloride 5 mg PO QHS - patient no longer taking because of cost  Changed:   -Omega-3 Fatty Acids --> 1 capsule PO daily    Additional medication history information (including reliability of information, actions taken by pharmacist):  -She was a fairly good historian knowing her medication names and when she last took her doses  -Endorsed not taking lorazepam 0.5 mg PO PRN recently      Prior to Admission medications    Medication Sig Last Dose Taking? Auth Provider   zolpidem (AMBIEN) 10 MG tablet TAKE ONE TABLET BY MOUTH AT BEDTIME AS NEEDED FOR SLEEP 3/16/2017 at Unknown time Yes Sam Tanner MD   simvastatin (ZOCOR) 40 MG tablet TAKE ONE TABLET BY MOUTH AT BEDTIME 3/16/2017 at Unknown time Yes Sam Tanner MD   citalopram (CELEXA) 20 MG tablet Take 1 tablet (20 mg) by mouth daily 3/16/2017 at AM Yes Sam Tanner MD   pantoprazole (PROTONIX) 40 MG EC tablet TAKE ONE TABLET BY MOUTH ONCE DAILY 3/16/2017 at Unknown time Yes Sam Tanner MD   donepezil (ARICEPT) 10 MG tablet TAKE ONE TABLET BY MOUTH AT BEDTIME 3/16/2017 at Unknown time Yes Sam Tanner MD   Omega-3 Fatty Acids (OMEGA-3 FISH OIL PO) Take 1 capsule by mouth daily  3/16/2017 at Unknown time Yes Reported, Patient   aspirin (ASPIRIN ADULT LOW STRENGTH) 81 MG chewable tablet Take 162 mg by mouth daily  3/16/2017 at AM Yes Jose M Dickson MD   cyanocolbalamin (VITAMIN  B-12) 100 MCG tablet Take 50 mcg by mouth daily. 3/16/2017 at Unknown time Yes Reported, Patient   mometasone (ELOCON) 0.1 % cream Apply sparingly to affected area twice daily as needed.  Do not apply to  __________________________________________________





Physical Exam - Admission


Physical Exam:  General Appearance: AGA, Hips: Stable, No Jaundice


Normal: Skin (4 tiny inclusion cysts tip of penis), Head (head molding), Equal 

Eyes Red Reflex, E.N.T. (Zach's pearls soft palate), Thorax, Equal Breath 

Sounds Lungs, Heart, Equal Peripheral Pulses, Abdomen, Genitals (bilateral 

hydrocele), Trunk and Spine, Extremities, Clavicles, Anus


Impression:


41 weeks gestation, Apgar 9/9, stable condition.  Physical exam benign


Respiratory: stable, no distress


FEN: encourage breast/formula as tolerated, monitor I&Os


ID: stable, no risk for sepsis; if symptomatic get CBC, CRP, and blood cultures


Social: infant's condition and plans as above reviewed and discussed with 

parents who agreed with the plans and voiced understanding


Admission Exam:  2018


Examined by:


Patient was examined 


Case reviewed and discussed with the resident team i.e. Dr. Trevin Park, Dr. Precious Roland and Dr. Vinicio Mahmood.


I was present for the entire history, physical, and medical decision making.





Maternal/Delivery/Infant Info


Maternal Information


Weeks Gestation:  40


Antepartum Risk Factors:  Labor Induction


Maternal Hepatitis B:  Negative


Maternal VDRL:  Negative


Maternal Gonorrhea:  Negative


Maternal Chlamydia:  Negative


Maternal Group B Strep:  Negative


Maternal HIV:  Negative





Delivery Information


Delivery Provider:  [facility]


Maternal Blood Type:  A


Maternal Rh Type:  Positive


Birth Complications:  None


Delivery Type:  Induced


Medications Given During Labor:  


fentanyl zofran pitocin


ROM Date:  2018


ROM Time:  005





Infant Information


Delivery Date:  2018


Delivery Time:  1611


Gestational Size:  AGA


Weight (Kilograms):  3.715


Height (Centimeters):  53.2


 Head Circumference:  34.5


 Chest Circumference:  34.00


Planned Feeding:  Breast Milk


Pediatrician:  tr[facility]





Administered Medications








 Medications  Dose


 Ordered  Sig/Piter  Start Time


 Stop Time Status Last Admin


 


 Phytonadione  1 mg  ONCE  ONCE  18 18:00


 18 18:26 DC 18 17:27


 


 


 Erythromycin  1 gm  ONCE  ONCE  18 18:00


 18 18:26 DC 18 17:27


 

















Vernon Bolivar MD 2018 07:27 face. Unknown at Unknown time  Sam Tanner MD   LORazepam (ATIVAN) 0.5 MG tablet TAKE ONE TABLET BY MOUTH TWICE DAILY AS NEEDED FOR ANXIETY More than a month at Unknown time  Magaly Ruffin PA         Medication history completed by:     Lamar Castellon, PharmD  PGY-2 Infectious Diseases Pharmacy Resident  Office Ph: [phone]  Pager: [phone]

## 2018-01-18 NOTE — ED PROVIDER NOTES
"  History     Chief Complaint   Patient presents with     Slurred Speech     The history is provided by the patient.     Mary Pemberton is a 61 year old female who brought for slurred speech that last about 40 min, symptoms already resolved by the time she got into ER. Hx similar episode     I have reviewed the Medications, Allergies, Past Medical and Surgical History, and Social History in the Epic system.    Review of Systems   Constitutional: Negative for chills, diaphoresis and fever.   HENT: Negative for voice change.    Eyes: Negative for visual disturbance.   Respiratory: Negative for cough, chest tightness, shortness of breath and wheezing.    Cardiovascular: Negative for chest pain, palpitations and leg swelling.   Gastrointestinal: Negative for abdominal distention, abdominal pain, anal bleeding, blood in stool, nausea and vomiting.   Genitourinary: Negative for decreased urine volume, dysuria, flank pain and hematuria.   Musculoskeletal: Negative for arthralgias, back pain, gait problem, myalgias, neck pain and neck stiffness.   Skin: Negative for color change, pallor, rash and wound.   Neurological: Negative for dizziness, syncope, light-headedness, numbness and headaches.   Psychiatric/Behavioral: Negative for confusion and suicidal ideas.       Physical Exam   BP: 124/74  Heart Rate: 52  Temp: 97.7  F (36.5  C)  Resp: 16  Height: 162.6 cm (5' 4\")  Weight: 77.1 kg (170 lb)  SpO2: 95 %  Physical Exam   Constitutional: She is oriented to person, place, and time. She appears well-developed and well-nourished.   HENT:   Head: Normocephalic and atraumatic.   Mouth/Throat: No oropharyngeal exudate.   Eyes: Conjunctivae are normal. Pupils are equal, round, and reactive to light.   Neck: Normal range of motion. Neck supple. No JVD present. No tracheal deviation present. No thyromegaly present.   Cardiovascular: Normal rate, regular rhythm, normal heart sounds and intact distal pulses.  Exam reveals no gallop and " no friction rub.    No murmur heard.  Pulmonary/Chest: Effort normal and breath sounds normal. No stridor. No respiratory distress. She has no wheezes. She has no rales. She exhibits no tenderness.   Abdominal: Soft. Bowel sounds are normal. She exhibits no distension and no mass. There is no tenderness. There is no rebound and no guarding.   Musculoskeletal: Normal range of motion. She exhibits no edema or tenderness.   Lymphadenopathy:     She has no cervical adenopathy.   Neurological: She is alert and oriented to person, place, and time.   Skin: Skin is warm and dry. No rash noted. No erythema. No pallor.   Psychiatric: Her behavior is normal.   Nursing note and vitals reviewed.      ED Course     ED Course     Procedures               Labs Ordered and Resulted from Time of ED Arrival Up to the Time of Departure from the ED   GLUCOSE BY METER - Abnormal; Notable for the following:        Result Value    Glucose 106 (*)     All other components within normal limits       Assessments & Plan (with Medical Decision Making)   Episode of slurred speech already resolved  Hx of multiple similar episode with extensive workup  No need for another set of workup  [facility], fu with her neurologist  I have reviewed the nursing notes.    I have reviewed the findings, diagnosis, plan and need for follow up with the patient.    Discharge Medication List as of 4/1/2017  8:36 PM          Final diagnoses:   Superficial siderosis of central nervous system       4/1/2017   [facility]     Yinka Pickard MD  04/03/17 0766     Patient/Caregiver provided printed discharge information.

## 2018-01-29 DIAGNOSIS — G47.00 PERSISTENT INSOMNIA: ICD-10-CM

## 2018-01-29 RX ORDER — ZOLPIDEM TARTRATE 10 MG/1
TABLET ORAL
Qty: 30 TABLET | Refills: 0 | Status: SHIPPED | OUTPATIENT
Start: 2018-01-29 | End: 2018-03-13

## 2018-01-29 NOTE — TELEPHONE ENCOUNTER
Ambien      Last Written Prescription Date:  12/1/17  Last Fill Quantity: 30,   # refills: 0  Last Office Visit: 10/4/17  Future Office visit:       Routing refill request to provider for review/approval because:  Drug not on the [facility], [facility] or [facility] refill protocol or controlled substance

## 2018-02-15 ENCOUNTER — HOSPITAL ENCOUNTER (EMERGENCY)
Facility: HOSPITAL | Age: 63
Discharge: HOME | End: 2018-02-15
Attending: PHYSICIAN ASSISTANT | Admitting: PHYSICIAN ASSISTANT
Payer: MEDICARE

## 2018-02-15 VITALS
RESPIRATION RATE: 18 BRPM | OXYGEN SATURATION: 96 % | TEMPERATURE: 97.3 F | DIASTOLIC BLOOD PRESSURE: 54 MMHG | SYSTOLIC BLOOD PRESSURE: 115 MMHG

## 2018-02-15 DIAGNOSIS — J01.00 ACUTE MAXILLARY SINUSITIS, RECURRENCE NOT SPECIFIED: ICD-10-CM

## 2018-02-15 DIAGNOSIS — Z20.828 EXPOSURE TO INFLUENZA: ICD-10-CM

## 2018-02-15 LAB
FLUAV+FLUBV AG SPEC QL: NEGATIVE
FLUAV+FLUBV AG SPEC QL: NEGATIVE
SPECIMEN SOURCE: NORMAL

## 2018-02-15 PROCEDURE — G0463 HOSPITAL OUTPT CLINIC VISIT: HCPCS

## 2018-02-15 PROCEDURE — 87804 INFLUENZA ASSAY W/OPTIC: CPT | Performed by: FAMILY MEDICINE

## 2018-02-15 PROCEDURE — 99213 OFFICE O/P EST LOW 20 MIN: CPT | Performed by: PHYSICIAN ASSISTANT

## 2018-02-15 RX ORDER — OSELTAMIVIR PHOSPHATE 75 MG/1
75 CAPSULE ORAL DAILY
Qty: 10 CAPSULE | Refills: 0 | Status: SHIPPED | OUTPATIENT
Start: 2018-02-15 | End: 2018-02-21

## 2018-02-15 RX ORDER — AMOXICILLIN 500 MG/1
500 CAPSULE ORAL 3 TIMES DAILY
Qty: 30 CAPSULE | Refills: 0 | Status: SHIPPED | OUTPATIENT
Start: 2018-02-15 | End: 2019-02-05

## 2018-02-15 ASSESSMENT — ENCOUNTER SYMPTOMS
HEADACHES: 0
ABDOMINAL PAIN: 0
APPETITE CHANGE: 0
COUGH: 1
FEVER: 0
SINUS PRESSURE: 1
VOICE CHANGE: 0
SINUS PAIN: 1
FATIGUE: 1
EYE REDNESS: 0
DIARRHEA: 0
EYE DISCHARGE: 0
NECK STIFFNESS: 0
TROUBLE SWALLOWING: 0
SORE THROAT: 0
LIGHT-HEADEDNESS: 0
DIZZINESS: 0
CARDIOVASCULAR NEGATIVE: 1
NECK PAIN: 0
VOMITING: 0
PSYCHIATRIC NEGATIVE: 1
NAUSEA: 0

## 2018-02-15 NOTE — ED NOTES
Pt presents today alone for c/o flu like s/s for the last week or 2. Pt was swabbed for influenza in triage.

## 2018-02-15 NOTE — ED AVS SNAPSHOT
[facility]    [address] [address]    Phone:  [phone]                                       Mary Pemberton   MRN: [id number]    Department:  [facility]   Date of Visit:  2/15/2018           Patient Information     Date Of Birth          1955        Your diagnoses for this visit were:     Acute maxillary sinusitis, recurrence not specified     Exposure to influenza        You were seen by Yessy Salazar PA.      Follow-up Information     Follow up with Sam Tanner MD.    Specialty:  Family Practice    Why:  If symptoms worsen    Contact information:    [address]  [facility] [address] [phone]          Follow up with [facility].    Specialty:  EMERGENCY MEDICINE    Why:  if further concerns develop    Contact information:    [address] [address] [phone]    Additional information:    From [facility]: Take [address]. Turn left at [address]. Turn left at the first stoplight on [address] [place] [address]. At the first stop sign, take a right onto [address]. Take a left into the parking lot and continue through until you reach the North enterance of the building.       From [place]: Take [address]. Take the [address] ramp towards [place]. Turn left onto [address]. Take a slight right onto [address]. Turn left at the first stoplight on [address]. At the first stop sign, take a right onto [address]. Take a left into the parking lot and continue through until you reach the North enterance of the building.       From [place]: Take [address]. Take a right at [address]. At the first stop sign, take a right onto [address]. Take a left into the parking lot and continue through until you reach the North enterance of the building.       Discharge References/Attachments     SINUSITIS (ANTIBIOTIC TREATMENT) (ENGLISH)          Review of your medicines      START taking        Dose / Directions Last dose taken    amoxicillin 500 MG capsule   Commonly known as:  AMOXIL   Dose:  500 mg   Quantity:  30 capsule        Take 1 capsule (500 mg) by mouth 3 times daily for 10 days   Refills:  0        oseltamivir 75 MG capsule   Commonly known as:  TAMIFLU   Dose:  75 mg   Quantity:  10 capsule        Take 1 capsule (75 mg) by mouth daily for 10 days   Refills:  0          Our records show that you are taking the medicines listed below. If these are incorrect, please call your family doctor or clinic.        Dose / Directions Last dose taken    ASPIRIN ADULT LOW STRENGTH 81 MG chewable tablet   Dose:  162 mg   Quantity:  36 tablet   Generic drug:  aspirin        Take 162 mg by mouth daily   Refills:  0        CELEXA PO   Dose:  20 mg        Take 20 mg by mouth   Refills:  0        cyanocolbalamin 100 MCG tablet   Commonly known as:  vitamin  B-12   Dose:  50 mcg        Take 50 mcg by mouth daily.   Refills:  0        OMEGA-3 FISH OIL PO   Dose:  1 capsule        Take 1 capsule by mouth daily   Refills:  0        PROTONIX PO   Dose:  40 mg        Take 40 mg by mouth   Refills:  0        simvastatin 40 MG tablet   Commonly known as:  ZOCOR   Quantity:  90 tablet        TAKE ONE TABLET BY MOUTH AT BEDTIME   Refills:  2        VITAMIN C PO        Refills:  0        zolpidem 10 MG tablet   Commonly known as:  AMBIEN   Quantity:  30 tablet        TAKE ONE TABLET BY MOUTH AT BEDTIME AS NEEDED FOR SLEEP   Refills:  0                Prescriptions were sent or printed at these locations (2 Prescriptions)                   [id number]  [address] - [address]   [address] [address], [place] [address]    Telephone:  [phone]   Fax:  [phone]   Hours:                  E-Prescribed (2 of 2)         oseltamivir (TAMIFLU) 75 MG capsule               amoxicillin (AMOXIL) 500 MG capsule                Procedures and tests performed during your visit  "    Influenza A/B antigen      Orders Needing Specimen Collection     None      Pending Results     No orders found from 2018 to 2018.            Pending Culture Results     No orders found from 2018 to 2018.            Thank you for choosing [facility]       Thank you for choosing [facility] for your care. Our goal is always to provide you with excellent care. Hearing back from our patients is one way we can continue to improve our services. Please take a few minutes to complete the written survey that you may receive in the mail after you visit with us. Thank you!        [facility] Information     [facility] lets you send messages to your doctor, view your test results, renew your prescriptions, schedule appointments and more. To sign up, go to [email or web address] . Click on \"Log in\" on the left side of the screen, which will take you to the Welcome page. Then click on \"Sign up Now\" on the right side of the page.     You will be asked to enter the access code listed below, as well as some personal information. Please follow the directions to create your username and password.     Your access code is: [email or web address]  Expires: 2018  1:18 PM     Your access code will  in 90 days. If you need help or a new code, please call your [facility] clinic or [phone].        Care EveryWhere ID     This is your Care EveryWhere ID. This could be used by other organizations to access your [facility] medical records  [id number]        Equal Access to Services     DELVIN VILLEGAS AH: Hadii emilee Kelly, waaxda lufabadaha, qaybta kaalmada taiwo, kelly bridges. So [facility] [phone].    ATENCIÓN: Si habla español, tiene a mckinnon disposición servicios gratuitos de asistencia lingüística. Llame al [phone].    We comply with applicable federal civil rights laws and [place] laws. We do not discriminate on the basis of race, color, national origin, age, disability, sex, sexual " orientation, or gender identity.            After Visit Summary       This is your record. Keep this with you and show to your community pharmacist(s) and doctor(s) at your next visit.

## 2018-02-15 NOTE — ED PROVIDER NOTES
History     Chief Complaint   Patient presents with     Flu Symptoms     c/o couch, sinus and body aches     The history is provided by the patient. No  was used.     Mary Pemberton is a 62 year old female who has 1+ weeks of congestion and maxillary sinus pain/pressure. Has decreased energy. No n/v/d/f/c. No ear pain. No sore throat. No change in b/b habits. No rash.  is being treated for influenza.      Problem List:    Patient Active Problem List    Diagnosis Date Noted     Obesity 10/10/2017     Priority: Medium     Partial idiopathic epilepsy with seizures of localized onset, not intractable, with status epilepticus (H) 10/10/2017     Priority: Medium     Superficial siderosis of central nervous system 03/28/2017     Priority: Medium     TIA (transient ischemic attack) 03/17/2017     Priority: Medium     ACP (advance care planning) 09/16/2016     Priority: Medium     Advance Care Planning 9/16/2016: ACP Review of Chart / Resources Provided:  Reviewed chart for advance care plan.  Mary Pemberton has no plan or code status on file. Discussed available resources and provided with information. Confirmed code status reflects current choices pending further ACP discussions.  Confirmed/documented legally designated decision makers.  Added by Jennie Bazzi             OAB (overactive bladder) 06/03/2016     Priority: Medium     Cognitive deficit following cerebrovascular accident (CVA) 06/03/2016     Priority: Medium     Prediabetes 01/21/2016     Priority: Medium     Nonspecific abnormal electrocardiogram (ECG) (EKG) 05/06/2015     Priority: Medium     H/O TIA (transient ischemic attack) 04/28/2015     Priority: Medium     04/2015 MRI negative  Carotid ultrasound without significant plaque       Dyslipidemia 09/08/2014     Priority: Medium     Major depressive disorder, recurrent episode, mild (H) 09/08/2014     Priority: Medium     GERD (gastroesophageal reflux disease) 09/08/2014      Priority: Medium     Malignant melanoma, metastatic (H) 07/03/2013     Priority: Medium        Past Medical History:    Past Medical History:   Diagnosis Date     Atrial fibrillation (H)      CAD (coronary atherosclerotic disease) 11/14/2012     CVA (cerebral infarction) 04/28/2015     Dementia 10/19/2012     Depressive disorder, not elsewhere classified 4/22/2008     GERD (gastroesophageal reflux disease) 11/14/2012     H/O: stroke 4/28/2015     Localized superficial swelling, mass, or lump 9/9/2004     Lump or mass in breast 1/3/2000     Malignant melanoma of skin of upper limb, including shoulder (H) 9/4/2001     Melanoma of skin (H) 4/16/2002     Melanoma of skin, site unspecified 4/16/2002     Other and unspecified hyperlipidemia 9/12/2002     Other specified episodic mood disorder 8/16/2002     Pre-diabetes 9/13/2011     Rash and other nonspecific skin eruption 11/7/2005       Past Surgical History:    Past Surgical History:   Procedure Laterality Date     arm      LT     brain bx       COLONOSCOPY  [id number]     open heart         Family History:    Family History   Problem Relation Age of Onset     CANCER Father      lung     Hypertension Mother      CANCER Maternal Grandfather        Social History:  Marital Status:   [2]  Social History   Substance Use Topics     Smoking status: Former Smoker     Packs/day: 1.00     Years: 20.00     Types: Cigarettes     Quit date: 6/6/2002     Smokeless tobacco: Never Used      Comment: year quit 2001; no passive exposure.     Alcohol use No        Medications:      Ascorbic Acid (VITAMIN C PO)   Citalopram Hydrobromide (CELEXA PO)   Pantoprazole Sodium (PROTONIX PO)   oseltamivir (TAMIFLU) 75 MG capsule   amoxicillin (AMOXIL) 500 MG capsule   zolpidem (AMBIEN) 10 MG tablet   simvastatin (ZOCOR) 40 MG tablet   aspirin (ASPIRIN ADULT LOW STRENGTH) 81 MG chewable tablet   cyanocolbalamin (VITAMIN  B-12) 100 MCG tablet   Omega-3 Fatty Acids (OMEGA-3 FISH OIL PO)          Review of Systems   Constitutional: Positive for fatigue. Negative for appetite change and fever.   HENT: Positive for congestion, sinus pain and sinus pressure. Negative for ear pain, sore throat, trouble swallowing and voice change.    Eyes: Negative for discharge and redness.   Respiratory: Positive for cough.    Cardiovascular: Negative.    Gastrointestinal: Negative for abdominal pain, diarrhea, nausea and vomiting.   Genitourinary: Negative.    Musculoskeletal: Negative for neck pain and neck stiffness.   Skin: Negative for rash.   Neurological: Negative for dizziness, light-headedness and headaches.   Psychiatric/Behavioral: Negative.        Physical Exam   BP: 115/54  Heart Rate: 71  Temp: 97.3  F (36.3  C)  Resp: 18  SpO2: 96 %      Physical Exam   Constitutional: She is oriented to person, place, and time. She appears well-developed and well-nourished. No distress.   HENT:   Head: Normocephalic and atraumatic.   Right Ear: External ear normal.   Left Ear: External ear normal.   Mouth/Throat: Oropharynx is clear and moist.   Bilateral TMs/canals clear/wnl  Bilateral maxillary sinus TTP     Eyes: Conjunctivae and EOM are normal. Right eye exhibits no discharge. Left eye exhibits no discharge.   Neck: Normal range of motion. Neck supple.   Cardiovascular: Normal rate, regular rhythm and normal heart sounds.    Pulmonary/Chest: Effort normal and breath sounds normal. No respiratory distress.   Abdominal: Soft. Bowel sounds are normal. She exhibits no distension. There is no tenderness.   Neurological: She is alert and oriented to person, place, and time.   Skin: Skin is warm and dry. No rash noted. She is not diaphoretic.   Psychiatric: She has a normal mood and affect.   Nursing note and vitals reviewed.      ED Course     ED Course     Procedures            Labs Ordered and Resulted from Time of ED Arrival Up to the Time of Departure from the ED   INFLUENZA A/B ANTIGEN       Assessments & Plan (with  Medical Decision Making)     I have reviewed the nursing notes.    I have reviewed the findings, diagnosis, plan and need for follow up with the patient.      Discharge Medication List as of 2/15/2018  1:19 PM      START taking these medications    Details   oseltamivir (TAMIFLU) 75 MG capsule Take 1 capsule (75 mg) by mouth daily for 10 days, Disp-10 capsule, R-0, E-Prescribe      amoxicillin (AMOXIL) 500 MG capsule Take 1 capsule (500 mg) by mouth 3 times daily for 10 days, Disp-30 capsule, R-0, E-Prescribe             Final diagnoses:   Acute maxillary sinusitis, recurrence not specified   Exposure to influenza         Patient verbally educated and given appropriate education sheets for each of the diagnoses and has no questions.  Take OTC motrin or tylenol as directed on the bottle as needed.  Take prescription medications as directed.  Increase fluids, wash hands often.  Sleep in a recliner or with multiple pillows until this has resolved.  Follow up with your provider if symptoms increase or if concerns develop, return to the ER.  Yessy Salazar Certified   Physician Assistant  2/15/2018  1:21 PM  URGENT CARE CLINIC    2/15/2018   [facility]     Yessy Salazar PA  02/15/18 3037

## 2018-02-15 NOTE — ED AVS SNAPSHOT
[facility]    [address] [place] [address]    Phone:  [phone]                                       Mary Pemberton   MRN: [id number]    Department:  [facility]   Date of Visit:  2/15/2018           After Visit Summary Signature Page     I have received my discharge instructions, and my questions have been answered. I have discussed any challenges I see with this plan with the nurse or doctor.    ..........................................................................................................................................  Patient/Patient Representative Signature      ..........................................................................................................................................  Patient Representative Print Name and Relationship to Patient    ..................................................               ................................................  Date                                            Time    ..........................................................................................................................................  Reviewed by Signature/Title    ...................................................              ..............................................  Date                                                            Time

## 2018-02-21 ENCOUNTER — OFFICE VISIT (OUTPATIENT)
Dept: FAMILY MEDICINE | Facility: OTHER | Age: 63
End: 2018-02-21
Attending: FAMILY MEDICINE
Payer: MEDICARE

## 2018-02-21 VITALS
TEMPERATURE: 97.6 F | SYSTOLIC BLOOD PRESSURE: 106 MMHG | HEART RATE: 64 BPM | BODY MASS INDEX: 31.53 KG/M2 | OXYGEN SATURATION: 98 % | DIASTOLIC BLOOD PRESSURE: 62 MMHG | WEIGHT: 178 LBS

## 2018-02-21 DIAGNOSIS — G40.909 SEIZURE DISORDER (H): ICD-10-CM

## 2018-02-21 DIAGNOSIS — J01.01 ACUTE RECURRENT MAXILLARY SINUSITIS: Primary | ICD-10-CM

## 2018-02-21 PROCEDURE — G0463 HOSPITAL OUTPT CLINIC VISIT: HCPCS

## 2018-02-21 PROCEDURE — 99214 OFFICE O/P EST MOD 30 MIN: CPT | Performed by: FAMILY MEDICINE

## 2018-02-21 RX ORDER — CEFPROZIL 250 MG/1
250 TABLET, FILM COATED ORAL 2 TIMES DAILY
Qty: 20 TABLET | Refills: 0 | Status: SHIPPED | OUTPATIENT
Start: 2018-02-21 | End: 2018-04-04

## 2018-02-21 ASSESSMENT — ANXIETY QUESTIONNAIRES
4. TROUBLE RELAXING: NOT AT ALL
5. BEING SO RESTLESS THAT IT IS HARD TO SIT STILL: NOT AT ALL
1. FEELING NERVOUS, ANXIOUS, OR ON EDGE: NOT AT ALL
6. BECOMING EASILY ANNOYED OR IRRITABLE: NOT AT ALL
GAD7 TOTAL SCORE: 0
7. FEELING AFRAID AS IF SOMETHING AWFUL MIGHT HAPPEN: NOT AT ALL
2. NOT BEING ABLE TO STOP OR CONTROL WORRYING: NOT AT ALL
3. WORRYING TOO MUCH ABOUT DIFFERENT THINGS: NOT AT ALL

## 2018-02-21 ASSESSMENT — PAIN SCALES - GENERAL: PAINLEVEL: NO PAIN (0)

## 2018-02-21 NOTE — PROGRESS NOTES
"Chief Complaint   Patient presents with     URI     Follow up from the ER seen 2/15/2018 dx sinus infection, given amoxicillin and tamiflu to exposure to flu. still having symptoms- coughing, nasal congestion, runny nose.      Seizures     Follow up medication recheck gabapentin given by Dr. Pagan. Last seizure- over 1 month ago. short seizure- states medication is helping symptoms.        Initial /62 (BP Location: Right arm, Patient Position: Chair, Cuff Size: Adult Regular)  Pulse 64  Temp 97.6  F (36.4  C) (Tympanic)  Wt 178 lb (80.7 kg)  SpO2 98%  BMI 31.53 kg/m2 Estimated body mass index is 31.53 kg/(m^2) as calculated from the following:    Height as of 8/8/17: 5' 3\" (1.6 m).    Weight as of this encounter: 178 lb (80.7 kg).  Medication Reconciliation: completecomplete      BETSY MARQUES      "

## 2018-02-21 NOTE — MR AVS SNAPSHOT
"              After Visit Summary   2018    Mary Pemberton    MRN: [id number]           Patient Information     Date Of Birth          1955        Visit Information        Provider Department      2018 11:20 AM Sam Tanner MD [facility]        Today's Diagnoses     Acute recurrent maxillary sinusitis    -  1    Seizure disorder (H)          Care Instructions    Take Cefzil twice daily          Follow-ups after your visit        Follow-up notes from your care team     Return if symptoms worsen or fail to improve.      Who to contact     If you have questions or need follow up information about today's clinic visit or your schedule please contact [facility] directly at [phone].  Normal or non-critical lab and imaging results will be communicated to you by MyChart, letter or phone within 4 business days after the clinic has received the results. If you do not hear from us within 7 days, please contact the clinic through MyChart or phone. If you have a critical or abnormal lab result, we will notify you by phone as soon as possible.  Submit refill requests through Tujia or call your pharmacy and they will forward the refill request to us. Please allow 3 business days for your refill to be completed.          Additional Information About Your Visit        MyChart Information     Tujia lets you send messages to your doctor, view your test results, renew your prescriptions, schedule appointments and more. To sign up, go to [email or web address] . Click on \"Log in\" on the left side of the screen, which will take you to the Welcome page. Then click on \"Sign up Now\" on the right side of the page.     You will be asked to enter the access code listed below, as well as some personal information. Please follow the directions to create your username and password.     Your access code is: [email or web address]  Expires: 2018  1:18 PM     Your access code will  in 90 days. " If you need help or a new code, please call your [place] clinic or [phone].        Care EveryWhere ID     This is your Care EveryWhere ID. This could be used by other organizations to access your [place] medical records  [id number]        Your Vitals Were     Pulse Temperature Pulse Oximetry BMI (Body Mass Index)          64 97.6  F (36.4  C) (Tympanic) 98% 31.53 kg/m2         Blood Pressure from Last 3 Encounters:   02/21/18 106/62   02/15/18 115/54   10/04/17 100/60    Weight from Last 3 Encounters:   02/21/18 178 lb (80.7 kg)   10/04/17 172 lb (78 kg)   08/08/17 170 lb (77.1 kg)              Today, you had the following     No orders found for display         Today's Medication Changes          These changes are accurate as of 2/21/18 11:59 PM.  If you have any questions, ask your nurse or doctor.               Start taking these medicines.        Dose/Directions    cefPROZIL 250 MG tablet   Commonly known as:  CEFZIL   Used for:  Acute recurrent maxillary sinusitis   Started by:  Sam Tanner MD        Dose:  250 mg   Take 1 tablet (250 mg) by mouth 2 times daily   Quantity:  20 tablet   Refills:  0         Stop taking these medicines if you haven't already. Please contact your care team if you have questions.     oseltamivir 75 MG capsule   Commonly known as:  TAMIFLU   Stopped by:  Sam Tanner MD                Where to get your medicines      These medications were sent to [id number] [place] [address] [id number]   [id number] , [place] [address]     Phone:  [phone]     cefPROZIL 250 MG tablet                Primary Care Provider Office Phone # Fax #    Sam Tanner [phone]       [address] [place] [address]  [place] [address]        Equal Access to Services     DELVIN VILLEGAS : Benny Kelly, waibisda ludiamond, qaybta kaalmajolene maravilla, kelly bridges. So [facility] [phone].    ATENCIÓN: Si andrade mcgovern mckinnon  disposición servicios gratuitos de asistencia lingüística. Linden pacheco [phone].    We comply with applicable federal civil rights laws and [place] laws. We do not discriminate on the basis of race, color, national origin, age, disability, sex, sexual orientation, or gender identity.            Thank you!     Thank you for choosing [facility]  for your care. Our goal is always to provide you with excellent care. Hearing back from our patients is one way we can continue to improve our services. Please take a few minutes to complete the written survey that you may receive in the mail after your visit with us. Thank you!             Your Updated Medication List - Protect others around you: Learn how to safely use, store and throw away your medicines at www.disposemymeds.org.          This list is accurate as of 2/21/18 11:59 PM.  Always use your most recent med list.                   Brand Name Dispense Instructions for use Diagnosis    amoxicillin 500 MG capsule    AMOXIL    30 capsule    Take 1 capsule (500 mg) by mouth 3 times daily for 10 days        ASPIRIN ADULT LOW STRENGTH 81 MG chewable tablet   Generic drug:  aspirin     36 tablet    Take 162 mg by mouth daily    CVA (cerebral infarction)       cefPROZIL 250 MG tablet    CEFZIL    20 tablet    Take 1 tablet (250 mg) by mouth 2 times daily    Acute recurrent maxillary sinusitis       CELEXA PO      Take 20 mg by mouth        cyanocolbalamin 100 MCG tablet    vitamin  B-12     Take 50 mcg by mouth daily.        GABAPENTIN PO      Take 600 mg by mouth 3 times daily        OMEGA-3 FISH OIL PO      Take 1 capsule by mouth daily        PROTONIX PO      Take 40 mg by mouth        simvastatin 40 MG tablet    ZOCOR    90 tablet    TAKE ONE TABLET BY MOUTH AT BEDTIME    Hyperlipidemia LDL goal <160       VITAMIN C PO           zolpidem 10 MG tablet    AMBIEN    30 tablet    TAKE ONE TABLET BY MOUTH AT BEDTIME AS NEEDED FOR SLEEP    Persistent insomnia

## 2018-02-21 NOTE — PROGRESS NOTES
SUBJECTIVE:  Mary Pemberton, 62 year old, female is seen with the following medical problems.    URITurner Hernandez has persistent nasal congestion and cough. This is associated with maxillary pain and pressure.  She was seen in the ER where she was placed on Tamiflu and Amoxicillin.  Her symptoms continue.    Seizure disorder.  In addition, she has a history of seizures and is followed by Neurology.  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  She was placed on gabapentin and her seizures are controlled.  No new symptoms.    Denies fever, shaking, chills, nausea, vomiting, or diarrhea.  No household contacts are ill.    Current Outpatient Prescriptions   Medication Sig Dispense Refill     GABAPENTIN PO Take 600 mg by mouth 3 times daily       Ascorbic Acid (VITAMIN C PO)        Citalopram Hydrobromide (CELEXA PO) Take 20 mg by mouth       Pantoprazole Sodium (PROTONIX PO) Take 40 mg by mouth       amoxicillin (AMOXIL) 500 MG capsule Take 1 capsule (500 mg) by mouth 3 times daily for 10 days 30 capsule 0     zolpidem (AMBIEN) 10 MG tablet TAKE ONE TABLET BY MOUTH AT BEDTIME AS NEEDED FOR SLEEP 30 tablet 0     simvastatin (ZOCOR) 40 MG tablet TAKE ONE TABLET BY MOUTH AT BEDTIME 90 tablet 2     Omega-3 Fatty Acids (OMEGA-3 FISH OIL PO) Take 1 capsule by mouth daily        aspirin (ASPIRIN ADULT LOW STRENGTH) 81 MG chewable tablet Take 162 mg by mouth daily  36 tablet      cyanocolbalamin (VITAMIN  B-12) 100 MCG tablet Take 50 mcg by mouth daily.          Allergies   Allergen Reactions     Codeine Nausea and Vomiting     Codeine Phosphate      Quinolones      Pt intolerance to steroids also     Tamiflu [Oseltamivir] Other (See Comments) and GI Disturbance     Stomach ache and headache        Past Medical History:   Diagnosis Date     Atrial fibrillation (H)     with Bradycardia     CAD (coronary atherosclerotic disease) 11/14/2012     CVA (cerebral infarction) 04/28/2015    with TPA     Dementia 10/19/2012      Depressive disorder, not elsewhere classified 4/22/2008     GERD (gastroesophageal reflux disease) 11/14/2012     H/O: stroke 4/28/2015     Localized superficial swelling, mass, or lump 9/9/2004     Lump or mass in breast 1/3/2000     Malignant melanoma of skin of upper limb, including shoulder (H) 9/4/2001     Melanoma of skin (H) 4/16/2002     Problem list name updated by automated process. Provider to review     Melanoma of skin, site unspecified 4/16/2002     Other and unspecified hyperlipidemia 9/12/2002     Other specified episodic mood disorder 8/16/2002     Pre-diabetes 9/13/2011     Rash and other nonspecific skin eruption 11/7/2005     Past Surgical History:   Procedure Laterality Date     arm      LT     brain bx       COLONOSCOPY  [id number]     open heart       Family History   Problem Relation Age of Onset     CANCER Father      lung     Hypertension Mother      CANCER Maternal Grandfather      Social History     Social History     Marital status:      Spouse name: N/A     Number of children: N/A     Years of education: N/A     Occupational History      Retired      Disabled     Social History Main Topics     Smoking status: Former Smoker     Packs/day: 1.00     Years: 20.00     Types: Cigarettes     Quit date: 6/6/2002     Smokeless tobacco: Never Used      Comment: year quit 2001; no passive exposure.     Alcohol use No     Drug use: No     Sexual activity: Not on file     Other Topics Concern     Blood Transfusions Yes     Caffeine Concern Yes     coffee - 2 cups daily     Exercise Yes     walking daily     Parent/Sibling W/ Cabg, Mi Or Angioplasty Before 65f 55m? No     Social History Narrative         Review Of Systems  Constitutional, HEENT, cardiovascular, pulmonary, gi and gu systems are negative, except as otherwise noted.    OBJECTIVE:  /62 (BP Location: Right arm, Patient Position: Chair, Cuff Size: Adult Regular)  Pulse 64  Temp 97.6  F (36.4  C) (Tympanic)  Wt 178 lb  (80.7 kg)  SpO2 98%  BMI 31.53 kg/m2      Exam:  Physical Exam   Constitutional: She is oriented to person, place, and time. She appears well-developed and well-nourished. No distress.   HENT:   Head: Normocephalic.   Right Ear: Hearing, tympanic membrane, external ear and ear canal normal.   Left Ear: Hearing, tympanic membrane, external ear and ear canal normal.   Nose: Right sinus exhibits maxillary sinus tenderness. Right sinus exhibits no frontal sinus tenderness. Left sinus exhibits maxillary sinus tenderness. Left sinus exhibits no frontal sinus tenderness.   Mouth/Throat: Uvula is midline and oropharynx is clear and moist. No oropharyngeal exudate or posterior oropharyngeal erythema.   Eyes: Conjunctivae are normal.   Neck: Neck supple.   Pulmonary/Chest: Effort normal and breath sounds normal.   Lymphadenopathy:     She has no cervical adenopathy.   Neurological: She is alert and oriented to person, place, and time.   Skin: Skin is warm and dry.   Psychiatric: She has a normal mood and affect.     Other exam not repeated    Labs:  Admission on 02/15/2018, Discharged on 02/15/2018   Component Date Value Ref Range Status     Influenza A/B Agn Specimen 02/15/2018 Nares   Final     Influenza A 02/15/2018 Negative  NEG^Negative Final     Influenza B 02/15/2018 Negative  NEG^Negative Final    Comment: Test results must be correlated with clinical data. If necessary, results   should be confirmed by a molecular assay or viral culture.         ASSESSMENT/PLAN:  Acute recurrent maxillary sinusitis  Switch to cefzil as written.  Continue symptomatic therapy.  - cefPROZIL (CEFZIL) 250 MG tablet; Take 1 tablet (250 mg) by mouth 2 times daily    Seizure disorder (H)  Improved.  Reviewed notes.  She will continue on gabapentin at current dose.  Follow up with Neurology.            Sam Tanner MD

## 2018-02-22 ASSESSMENT — ANXIETY QUESTIONNAIRES: GAD7 TOTAL SCORE: 0

## 2018-02-22 ASSESSMENT — PATIENT HEALTH QUESTIONNAIRE - PHQ9: SUM OF ALL RESPONSES TO PHQ QUESTIONS 1-9: 3

## 2018-02-25 PROBLEM — G45.9 TIA (TRANSIENT ISCHEMIC ATTACK): Status: ACTIVE | Noted: 2017-03-17

## 2018-02-25 PROBLEM — G96.89 SUPERFICIAL SIDEROSIS OF CENTRAL NERVOUS SYSTEM: Status: ACTIVE | Noted: 2017-03-28

## 2018-02-25 PROBLEM — G40.909 SEIZURE DISORDER (H): Status: ACTIVE | Noted: 2018-02-25

## 2018-02-25 PROBLEM — G40.001: Status: ACTIVE | Noted: 2017-10-10

## 2018-03-13 DIAGNOSIS — G47.00 PERSISTENT INSOMNIA: ICD-10-CM

## 2018-03-13 RX ORDER — ZOLPIDEM TARTRATE 10 MG/1
TABLET ORAL
Qty: 30 TABLET | Refills: 0 | Status: SHIPPED | OUTPATIENT
Start: 2018-03-13 | End: 2018-05-03

## 2018-03-13 NOTE — TELEPHONE ENCOUNTER
Ambien      Last Written Prescription Date:  1/29/18  Last Fill Quantity: 30,   # refills: 0  Last Office Visit: 2/21/18 with Dr. Tanner  Future Office visit:  none    Routing refill request to provider for review/approval because:  Drug not on the [facility], [facility] or [facility] refill protocol or controlled substance  Thank you, Dr. Misti Lester for addressing this refill for Dr. Tanner who is out of the office today.

## 2018-03-25 DIAGNOSIS — E78.5 HYPERLIPIDEMIA LDL GOAL <160: ICD-10-CM

## 2018-03-25 NOTE — LETTER
[facility] [place]  [address] [place] [address]  Phone: [phone]    March 27, 2018       Mary Pemberton  [address]  [facility] [place] [facility] [place] [address]            Dear Mary:    We are concerned about your health care.  We recently provided you with medication refills.  Lab tests are required every year in order to continue refilling your Zocor.  Orders have been placed in our computer and should be accessible at most [facility] labs. Please complete this lab work as soon as possible.        Thank You,      Sam Tanner MD  [facility]

## 2018-03-26 NOTE — TELEPHONE ENCOUNTER
zocor      Last Written Prescription Date:  4/28/17  Last Fill Quantity: 90,   # refills: 2  Last Office Visit: 2/21/18  Future Office visit:  none

## 2018-03-27 RX ORDER — SIMVASTATIN 40 MG
TABLET ORAL
Qty: 90 TABLET | Refills: 0 | Status: SHIPPED | OUTPATIENT
Start: 2018-03-27 | End: 2018-07-06

## 2018-04-04 ENCOUNTER — APPOINTMENT (OUTPATIENT)
Dept: LAB | Facility: OTHER | Age: 63
End: 2018-04-04
Attending: FAMILY MEDICINE
Payer: MEDICARE

## 2018-04-04 ENCOUNTER — OFFICE VISIT (OUTPATIENT)
Dept: FAMILY MEDICINE | Facility: OTHER | Age: 63
End: 2018-04-04
Attending: FAMILY MEDICINE
Payer: MEDICARE

## 2018-04-04 VITALS
BODY MASS INDEX: 31.53 KG/M2 | RESPIRATION RATE: 19 BRPM | OXYGEN SATURATION: 98 % | WEIGHT: 178 LBS | SYSTOLIC BLOOD PRESSURE: 110 MMHG | DIASTOLIC BLOOD PRESSURE: 64 MMHG | HEART RATE: 62 BPM

## 2018-04-04 DIAGNOSIS — E78.5 HYPERLIPIDEMIA LDL GOAL <160: ICD-10-CM

## 2018-04-04 LAB
ALBUMIN SERPL-MCNC: 4 G/DL (ref 3.4–5)
ALP SERPL-CCNC: 84 U/L (ref 40–150)
ALT SERPL W P-5'-P-CCNC: 31 U/L (ref 0–50)
ANION GAP SERPL CALCULATED.3IONS-SCNC: 7 MMOL/L (ref 3–14)
AST SERPL W P-5'-P-CCNC: 19 U/L (ref 0–45)
BILIRUB SERPL-MCNC: 0.4 MG/DL (ref 0.2–1.3)
BUN SERPL-MCNC: 12 MG/DL (ref 7–30)
CALCIUM SERPL-MCNC: 8.9 MG/DL (ref 8.5–10.1)
CHLORIDE SERPL-SCNC: 108 MMOL/L (ref 94–109)
CHOLEST SERPL-MCNC: 211 MG/DL
CO2 SERPL-SCNC: 25 MMOL/L (ref 20–32)
CREAT SERPL-MCNC: 0.76 MG/DL (ref 0.52–1.04)
GFR SERPL CREATININE-BSD FRML MDRD: 77 ML/MIN/1.7M2
GLUCOSE SERPL-MCNC: 111 MG/DL (ref 70–99)
HDLC SERPL-MCNC: 48 MG/DL
LDLC SERPL CALC-MCNC: 104 MG/DL
NONHDLC SERPL-MCNC: 163 MG/DL
POTASSIUM SERPL-SCNC: 4.3 MMOL/L (ref 3.4–5.3)
PROT SERPL-MCNC: 7.4 G/DL (ref 6.8–8.8)
SODIUM SERPL-SCNC: 140 MMOL/L (ref 133–144)
TRIGL SERPL-MCNC: 295 MG/DL

## 2018-04-04 PROCEDURE — G0463 HOSPITAL OUTPT CLINIC VISIT: HCPCS

## 2018-04-04 PROCEDURE — 99213 OFFICE O/P EST LOW 20 MIN: CPT | Performed by: FAMILY MEDICINE

## 2018-04-04 PROCEDURE — 80053 COMPREHEN METABOLIC PANEL: CPT | Mod: ZL | Performed by: FAMILY MEDICINE

## 2018-04-04 PROCEDURE — 80061 LIPID PANEL: CPT | Mod: ZL | Performed by: FAMILY MEDICINE

## 2018-04-04 PROCEDURE — 36415 COLL VENOUS BLD VENIPUNCTURE: CPT | Mod: ZL | Performed by: FAMILY MEDICINE

## 2018-04-04 ASSESSMENT — ANXIETY QUESTIONNAIRES
1. FEELING NERVOUS, ANXIOUS, OR ON EDGE: NOT AT ALL
GAD7 TOTAL SCORE: 1
4. TROUBLE RELAXING: NOT AT ALL
3. WORRYING TOO MUCH ABOUT DIFFERENT THINGS: NOT AT ALL
5. BEING SO RESTLESS THAT IT IS HARD TO SIT STILL: NOT AT ALL
7. FEELING AFRAID AS IF SOMETHING AWFUL MIGHT HAPPEN: NOT AT ALL
6. BECOMING EASILY ANNOYED OR IRRITABLE: NOT AT ALL
2. NOT BEING ABLE TO STOP OR CONTROL WORRYING: SEVERAL DAYS
IF YOU CHECKED OFF ANY PROBLEMS ON THIS QUESTIONNAIRE, HOW DIFFICULT HAVE THESE PROBLEMS MADE IT FOR YOU TO DO YOUR WORK, TAKE CARE OF THINGS AT HOME, OR GET ALONG WITH OTHER PEOPLE: NOT DIFFICULT AT ALL

## 2018-04-04 ASSESSMENT — PAIN SCALES - GENERAL: PAINLEVEL: NO PAIN (0)

## 2018-04-04 NOTE — MR AVS SNAPSHOT
"              After Visit Summary   4/4/2018    Mary Pemberton    MRN: [id number]           Patient Information     Date Of Birth          1955        Visit Information        Provider Department      4/4/2018 9:40 AM Sam Tanner MD [facility]        Today's Diagnoses     Dyslipidemia          Care Instructions    Continue same medications.            Follow-ups after your visit        Follow-up notes from your care team     Return in about 6 months (around 10/4/2018) for Follow up visit seizure.      Your next 10 appointments already scheduled     Oct 05, 2018 10:40 AM CDT   (Arrive by 10:25 AM)   SHORT with Sam Tanner MD   [facility] [place] ([facility] )    [address]  [place] [address]   [phone]              Who to contact     If you have questions or need follow up information about today's clinic visit or your schedule please contact [facility] [place] directly at [phone].  Normal or non-critical lab and imaging results will be communicated to you by MyChart, letter or phone within 4 business days after the clinic has received the results. If you do not hear from us within 7 days, please contact the clinic through Entrecardhart or phone. If you have a critical or abnormal lab result, we will notify you by phone as soon as possible.  Submit refill requests through BBspace or call your pharmacy and they will forward the refill request to us. Please allow 3 business days for your refill to be completed.          Additional Information About Your Visit        MyChart Information     BBspace lets you send messages to your doctor, view your test results, renew your prescriptions, schedule appointments and more. To sign up, go to [email or web address] . Click on \"Log in\" on the left side of the screen, which will take you to the Welcome page. Then click on \"Sign up Now\" on the right side of the page.     You will be asked to enter the access " code listed below, as well as some personal information. Please follow the directions to create your username and password.     Your access code is: [email or web address]  Expires: 2018  2:18 PM     Your access code will  in 90 days. If you need help or a new code, please call your [facility] clinic or [phone].        Care EveryWhere ID     This is your Care EveryWhere ID. This could be used by other organizations to access your [facility] medical records  [id number]        Your Vitals Were     Pulse Respirations Pulse Oximetry BMI (Body Mass Index)          62 19 98% 31.53 kg/m2         Blood Pressure from Last 3 Encounters:   18 110/64   18 106/62   02/15/18 115/54    Weight from Last 3 Encounters:   18 178 lb (80.7 kg)   18 178 lb (80.7 kg)   10/04/17 172 lb (78 kg)              We Performed the Following     Comprehensive metabolic panel     Lipid Profile (Chol, Trig, HDL, LDL calc)        Primary Care Provider Office Phone # Fax #    Sam Tanner [phone]       [facility] [place] [id number]        Equal Access to Services     DELVIN VILLEGAS : Hadii emilee stewardo Soneydaali, waaxda luqadaha, qaybta kaalmada adeegyada, kelly sutherland . So [facility] [phone].    ATENCIÓN: Si habla español, tiene a mckinnon disposición servicios gratuitos de asistencia lingüística. ame al [phone].    We comply with applicable federal civil rights laws and [place] laws. We do not discriminate on the basis of race, color, national origin, age, disability, sex, sexual orientation, or gender identity.            Thank you!     Thank you for choosing [facility]  for your care. Our goal is always to provide you with excellent care. Hearing back from our patients is one way we can continue to improve our services. Please take a few minutes to complete the written survey that you may receive in the mail after your visit with us. Thank  you!             Your Updated Medication List - Protect others around you: Learn how to safely use, store and throw away your medicines at www.disposemymeds.org.          This list is accurate as of 4/4/18 10:12 AM.  Always use your most recent med list.                   Brand Name Dispense Instructions for use Diagnosis    ASPIRIN ADULT LOW STRENGTH 81 MG chewable tablet   Generic drug:  aspirin     36 tablet    Take 162 mg by mouth daily    CVA (cerebral infarction)       CELEXA PO      Take 20 mg by mouth        cyanocolbalamin 100 MCG tablet    vitamin  B-12     Take 50 mcg by mouth daily.        GABAPENTIN PO      Take 600 mg by mouth 3 times daily        OMEGA-3 FISH OIL PO      Take 1 capsule by mouth daily        PROTONIX PO      Take 40 mg by mouth        simvastatin 40 MG tablet    ZOCOR    90 tablet    TAKE ONE TABLET BY MOUTH AT BEDTIME    Hyperlipidemia LDL goal <160       VITAMIN C PO           zolpidem 10 MG tablet    AMBIEN    30 tablet    TAKE ONE TABLET BY MOUTH AT BEDTIME AS NEEDED FOR SLEEP    Persistent insomnia

## 2018-04-04 NOTE — NURSING NOTE
"Chief Complaint   Patient presents with     Lipids       Initial /64  Pulse 62  Resp 19  Wt 178 lb (80.7 kg)  SpO2 98%  BMI 31.53 kg/m2 Estimated body mass index is 31.53 kg/(m^2) as calculated from the following:    Height as of 8/8/17: 5' 3\" (1.6 m).    Weight as of this encounter: 178 lb (80.7 kg).  Medication Reconciliation: complete   Alla Dorsey      "

## 2018-04-04 NOTE — LETTER
April 6, 2018      Mary [place]  [address] [place] [address]        Dear ,    We are writing to inform you of your test results.        Resulted Orders   Lipid Profile (Chol, Trig, HDL, LDL calc)   Result Value Ref Range    Cholesterol 211 (H) <200 mg/dL    Triglycerides 295 (H) <150 mg/dL      Comment:      Fasting specimen    HDL Cholesterol 48 (L) >49 mg/dL    LDL Cholesterol Calculated 104 (H) <100 mg/dL      Comment:      Above desirable:  100-129 mg/dl  Borderline High:  130-159 mg/dL  High:             160-189 mg/dL  Very high:       >189 mg/dl      Non HDL Cholesterol 163 (H) <130 mg/dL      Comment:      Above Desirable:  130-159 mg/dl  Borderline high:  160-189 mg/dl  High:             190-219 mg/dl  Very high:       >219 mg/dl     Comprehensive metabolic panel   Result Value Ref Range    Sodium 140 133 - 144 mmol/L    Potassium 4.3 3.4 - 5.3 mmol/L    Chloride 108 94 - 109 mmol/L    Carbon Dioxide 25 20 - 32 mmol/L    Anion Gap 7 3 - 14 mmol/L    Glucose 111 (H) 70 - 99 mg/dL    Urea Nitrogen 12 7 - 30 mg/dL    Creatinine 0.76 0.52 - 1.04 mg/dL    GFR Estimate 77 >60 mL/min/1.7m2      Comment:      Non  GFR Calc    GFR Estimate If Black >90 >60 mL/min/1.7m2      Comment:       GFR Calc    Calcium 8.9 8.5 - 10.1 mg/dL    Bilirubin Total 0.4 0.2 - 1.3 mg/dL    Albumin 4.0 3.4 - 5.0 g/dL    Protein Total 7.4 6.8 - 8.8 g/dL    Alkaline Phosphatase 84 40 - 150 U/L    ALT 31 0 - 50 U/L    AST 19 0 - 45 U/L       If you have any questions or concerns, please call the clinic at the number listed above.       Sincerely,        Sam Tanner MD

## 2018-04-04 NOTE — PROGRESS NOTES
SUBJECTIVE:   Mary Pemberton is a 62 year old female who presents to clinic today for the following health issues:      Hyperlipidemia Follow-Up      Rate your low fat/cholesterol diet?: good    Taking statin?  Yes, no muscle aches from statin    Other lipid medications/supplements?:  none      Amount of exercise or physical activity: None    Problems taking medications regularly: No    Medication side effects: none    Diet: regular (no restrictions)    Problem list and histories reviewed & adjusted, as indicated.  Additional history: as documented    Patient Active Problem List   Diagnosis     Malignant melanoma, metastatic (H)     Dyslipidemia     Major depressive disorder, recurrent episode, mild (H)     GERD (gastroesophageal reflux disease)     Nonspecific abnormal electrocardiogram (ECG) (EKG)     Prediabetes     OAB (overactive bladder)     Cognitive deficit following cerebrovascular accident (CVA)     ACP (advance care planning)     TIA (transient ischemic attack)     Superficial siderosis of central nervous system     Obesity     Partial idiopathic epilepsy with seizures of localized onset, not intractable, with status epilepticus (H)     Seizure disorder (H)     Past Surgical History:   Procedure Laterality Date     arm      LT     brain bx       COLONOSCOPY  [id number]     open heart         Social History   Substance Use Topics     Smoking status: Former Smoker     Packs/day: 1.00     Years: 20.00     Types: Cigarettes     Quit date: 6/6/2002     Smokeless tobacco: Never Used      Comment: year quit 2001; no passive exposure.     Alcohol use No     Family History   Problem Relation Age of Onset     CANCER Father      lung     Hypertension Mother      CANCER Maternal Grandfather          Current Outpatient Prescriptions   Medication Sig Dispense Refill     simvastatin (ZOCOR) 40 MG tablet TAKE ONE TABLET BY MOUTH AT BEDTIME 90 tablet 0     zolpidem (AMBIEN) 10 MG tablet TAKE ONE TABLET BY MOUTH AT BEDTIME  AS NEEDED FOR SLEEP 30 tablet 0     GABAPENTIN PO Take 600 mg by mouth 3 times daily       Ascorbic Acid (VITAMIN C PO)        Citalopram Hydrobromide (CELEXA PO) Take 20 mg by mouth       Pantoprazole Sodium (PROTONIX PO) Take 40 mg by mouth       Omega-3 Fatty Acids (OMEGA-3 FISH OIL PO) Take 1 capsule by mouth daily        aspirin (ASPIRIN ADULT LOW STRENGTH) 81 MG chewable tablet Take 162 mg by mouth daily  36 tablet      cyanocolbalamin (VITAMIN  B-12) 100 MCG tablet Take 50 mcg by mouth daily.       Allergies   Allergen Reactions     Codeine Nausea and Vomiting     Codeine Phosphate      Quinolones      Pt intolerance to steroids also     Tamiflu [Oseltamivir] Other (See Comments) and GI Disturbance     Stomach ache and headache        Reviewed and updated as needed this visit by clinical staff  Tobacco  Allergies  Meds  Med Hx  Surg Hx  Fam Hx  Soc Hx      Reviewed and updated as needed this visit by Provider         ROS:  Constitutional, HEENT, cardiovascular, pulmonary, gi and gu systems are negative, except as otherwise noted.    OBJECTIVE:     /64  Pulse 62  Resp 19  Wt 178 lb (80.7 kg)  SpO2 98%  BMI 31.53 kg/m2  Body mass index is 31.53 kg/(m^2).  Physical Exam   Constitutional: She is oriented to person, place, and time. She appears well-developed and well-nourished. No distress.   Neurological: She is alert and oriented to person, place, and time.   Psychiatric: She has a normal mood and affect.     Other exam not repeated  Diagnostic Test Results:  none     ASSESSMENT/PLAN:     Dyslipidemia  Fasting labs reviewed.  Goals discussed.  Discussed the importance of diet, exercise, and weight reduction.  Follow up 12 months.    - Lipid Profile (Chol, Trig, HDL, LDL calc)  - Comprehensive metabolic panel      Sam Tanner MD  [facility]

## 2018-04-05 ASSESSMENT — PATIENT HEALTH QUESTIONNAIRE - PHQ9: SUM OF ALL RESPONSES TO PHQ QUESTIONS 1-9: 1

## 2018-04-05 ASSESSMENT — ANXIETY QUESTIONNAIRES: GAD7 TOTAL SCORE: 1

## 2018-05-03 DIAGNOSIS — G47.00 PERSISTENT INSOMNIA: ICD-10-CM

## 2018-05-03 RX ORDER — ZOLPIDEM TARTRATE 10 MG/1
TABLET ORAL
Qty: 30 TABLET | Refills: 0 | Status: SHIPPED | OUTPATIENT
Start: 2018-05-03 | End: 2018-06-11

## 2018-05-03 NOTE — TELEPHONE ENCOUNTER
Ambien  Last Written Prescription Date:  3/13/18  Last Fill Qty:  30, # Refills:  0  Last Office Visit:  4/4/18    PCP is Dr. Tanner.  Medication is pended.  Thank you.

## 2018-05-15 ENCOUNTER — OFFICE VISIT (OUTPATIENT)
Dept: FAMILY MEDICINE | Facility: OTHER | Age: 63
End: 2018-05-15
Attending: FAMILY MEDICINE
Payer: MEDICARE

## 2018-05-15 VITALS
DIASTOLIC BLOOD PRESSURE: 72 MMHG | RESPIRATION RATE: 22 BRPM | OXYGEN SATURATION: 96 % | HEART RATE: 53 BPM | SYSTOLIC BLOOD PRESSURE: 120 MMHG | TEMPERATURE: 98.3 F | WEIGHT: 178 LBS | BODY MASS INDEX: 31.53 KG/M2

## 2018-05-15 DIAGNOSIS — M89.9 SKULL LESION: ICD-10-CM

## 2018-05-15 DIAGNOSIS — Z12.11 COLON CANCER SCREENING: Primary | ICD-10-CM

## 2018-05-15 PROCEDURE — G0463 HOSPITAL OUTPT CLINIC VISIT: HCPCS

## 2018-05-15 PROCEDURE — 99213 OFFICE O/P EST LOW 20 MIN: CPT | Performed by: FAMILY MEDICINE

## 2018-05-15 ASSESSMENT — ANXIETY QUESTIONNAIRES
GAD7 TOTAL SCORE: 2
4. TROUBLE RELAXING: SEVERAL DAYS
6. BECOMING EASILY ANNOYED OR IRRITABLE: NOT AT ALL
3. WORRYING TOO MUCH ABOUT DIFFERENT THINGS: SEVERAL DAYS
5. BEING SO RESTLESS THAT IT IS HARD TO SIT STILL: NOT AT ALL
IF YOU CHECKED OFF ANY PROBLEMS ON THIS QUESTIONNAIRE, HOW DIFFICULT HAVE THESE PROBLEMS MADE IT FOR YOU TO DO YOUR WORK, TAKE CARE OF THINGS AT HOME, OR GET ALONG WITH OTHER PEOPLE: NOT DIFFICULT AT ALL
2. NOT BEING ABLE TO STOP OR CONTROL WORRYING: NOT AT ALL
1. FEELING NERVOUS, ANXIOUS, OR ON EDGE: NOT AT ALL
7. FEELING AFRAID AS IF SOMETHING AWFUL MIGHT HAPPEN: NOT AT ALL

## 2018-05-15 ASSESSMENT — PAIN SCALES - GENERAL: PAINLEVEL: NO PAIN (0)

## 2018-05-15 NOTE — MR AVS SNAPSHOT
After Visit Summary   5/15/2018    Mary Pemberton    MRN: [id number]           Patient Information     Date Of Birth          1955        Visit Information        Provider Department      5/15/2018 4:00 PM Sam Tanner MD [facility]        Today's Diagnoses     Colon cancer screening    -  1    Skull lesion          Care Instructions    Radiology will call to schedule CT skull.            Follow-ups after your visit        Additional Services     GENERAL SURG ADULT REFERRAL       Your provider has referred you to: [facility]    Please be aware that coverage of these services is subject to the terms and limitations of your health insurance plan.  Call member services at your health plan with any benefit or coverage questions.      Please bring the following with you to your appointment:    (1) Any X-Rays, CTs or MRIs which have been performed.  Contact the facility where they were done to arrange for  prior to your scheduled appointment.   (2) List of current medications   (3) This referral request   (4) Any documents/labs given to you for this referral                  Follow-up notes from your care team     Return in about 1 week (around 5/22/2018) for Test Results.      Your next 10 appointments already scheduled     May 25, 2018  1:40 PM CDT   (Arrive by 1:25 PM)   SHORT with [facility]   [facility]    [address] [facility] [address]   [phone]            May 31, 2018 11:00 AM CDT   (Arrive by 10:45 AM)   MA SCREENING BILATERAL W/ JASMIN with HCMA1   [facility] ([facility] )    [address]  [facility] [address]   [phone]           Three-dimensional (3D) mammograms are available at [facility] locations in [place], [place], [place], [place], [facility], [place], [place], and [place]. [facility] locations include [place]  "[facility] in [place]. Benefits of 3D mammograms include: - Improved rate of cancer detection - Decreases your chance of having to go back for more tests, which means fewer: - \"False-positive\" results (This means that there is an abnormal area but it isn't cancer.) - Invasive testing procedures, such as a biopsy or surgery - Can provide clearer images of the breast if you have dense breast tissue. 3D mammography is an optional exam that anyone can have with a 2D mammogram. It doesn't replace or take the place of a 2D mammogram. 2D mammograms remain an effective screening test for all women.  Not all insurance companies cover the cost of a 3D mammogram. Check with your insurance.            Oct 05, 2018 10:40 AM CDT   (Arrive by 10:25 AM)   SHORT with Sam Tanner MD   [facility] [place] ([facility] )    [address]  [place] [address]   [phone]              Who to contact     If you have questions or need follow up information about today's clinic visit or your schedule please contact [facility] directly at [phone].  Normal or non-critical lab and imaging results will be communicated to you by MyChart, letter or phone within 4 business days after the clinic has received the results. If you do not hear from us within 7 days, please contact the clinic through [employer] or phone. If you have a critical or abnormal lab result, we will notify you by phone as soon as possible.  Submit refill requests through [employer] or call your pharmacy and they will forward the refill request to us. Please allow 3 business days for your refill to be completed.          Additional Information About Your Visit        MyChart Information     [employer] lets you send messages to your doctor, view your test results, renew your prescriptions, schedule appointments and more. To sign up, go to [email or web address] . Click on \"Log in\" on the left side of the screen, " "which will take you to the Welcome page. Then click on \"Sign up Now\" on the right side of the page.     You will be asked to enter the access code listed below, as well as some personal information. Please follow the directions to create your username and password.     Your access code is: [email or web address]  Expires: 2018  6:43 AM     Your access code will  in 90 days. If you need help or a new code, please call your [facility] clinic or [phone].        Care EveryWhere ID     This is your Care EveryWhere ID. This could be used by other organizations to access your [facility] medical records  [id number]        Your Vitals Were     Pulse Temperature Respirations Pulse Oximetry BMI (Body Mass Index)       53 98.3  F (36.8  C) (Tympanic) 22 96% 31.53 kg/m2        Blood Pressure from Last 3 Encounters:   05/15/18 120/72   18 110/64   18 106/62    Weight from Last 3 Encounters:   05/15/18 178 lb (80.7 kg)   18 178 lb (80.7 kg)   18 178 lb (80.7 kg)              We Performed the Following     GENERAL SURG ADULT REFERRAL        Primary Care Provider Office Phone # Fax #    aSm Tanner [phone] [address] [id number]        Equal Access to Services     [facility]: Hadii emilee stewardo Sokendall, waaxda luqadaha, qaybta kaalkelly jasmine . So [facility] [phone].    ATENCIÓN: Si habla español, tiene a mckinnon disposición servicios gratuitos de asistencia lingüística. [place] al [phone].    We comply with applicable federal civil rights laws and [place] laws. We do not discriminate on the basis of race, color, national origin, age, disability, sex, sexual orientation, or gender identity.            Thank you!     Thank you for choosing [facility]  for your care. Our goal is always to provide you with excellent care. Hearing back from our patients is one way we can continue to improve our " services. Please take a few minutes to complete the written survey that you may receive in the mail after your visit with us. Thank you!             Your Updated Medication List - Protect others around you: Learn how to safely use, store and throw away your medicines at www.disposemymeds.org.          This list is accurate as of 5/15/18 11:59 PM.  Always use your most recent med list.                   Brand Name Dispense Instructions for use Diagnosis    ASPIRIN ADULT LOW STRENGTH 81 MG chewable tablet   Generic drug:  aspirin     36 tablet    Take 162 mg by mouth daily    CVA (cerebral infarction)       CELEXA PO      Take 20 mg by mouth        cyanocolbalamin 100 MCG tablet    vitamin  B-12     Take 50 mcg by mouth daily.        GABAPENTIN PO      Take 600 mg by mouth 3 times daily        OMEGA-3 FISH OIL PO      Take 1 capsule by mouth daily        PROTONIX PO      Take 40 mg by mouth        simvastatin 40 MG tablet    ZOCOR    90 tablet    TAKE ONE TABLET BY MOUTH AT BEDTIME    Hyperlipidemia LDL goal <160       VITAMIN C PO           zolpidem 10 MG tablet    AMBIEN    30 tablet    TAKE 1 TABLET BY MOUTH AT BEDTIME AS NEEDED FOR SLEEP    Persistent insomnia

## 2018-05-15 NOTE — PROGRESS NOTES
SUBJECTIVE:   Mary Pemberton is a 62 year old female who presents to clinic today for the following health issues:      Lump/ indent on head      Duration: noticed 3 days ago    Description (location/character/radiation): small lump on top of head with small indent behind it    Intensity:  N/A    Accompanying signs and symptoms: N/A    History (similar episodes/previous evaluation): had surgery on that area of skull years ago    Precipitating or alleviating factors: N/A    Therapies tried and outcome: N/A     Mary has a history of malignant melanoma and has had previous brain lesion biopsy.  She has noticed an indentation over the right parietal region.  No associated symptoms.        Problem list and histories reviewed & adjusted, as indicated.  Additional history: as documented    Patient Active Problem List   Diagnosis     Malignant melanoma, metastatic (H)     Dyslipidemia     Major depressive disorder, recurrent episode, mild (H)     GERD (gastroesophageal reflux disease)     Nonspecific abnormal electrocardiogram (ECG) (EKG)     Prediabetes     OAB (overactive bladder)     Cognitive deficit following cerebrovascular accident (CVA)     ACP (advance care planning)     TIA (transient ischemic attack)     Superficial siderosis of central nervous system     Obesity     Partial idiopathic epilepsy with seizures of localized onset, not intractable, with status epilepticus (H)     Seizure disorder (H)     Past Surgical History:   Procedure Laterality Date     arm      LT     brain bx       COLONOSCOPY  [id number]     open heart         Social History   Substance Use Topics     Smoking status: Former Smoker     Packs/day: 1.00     Years: 20.00     Types: Cigarettes     Quit date: 6/6/2002     Smokeless tobacco: Never Used      Comment: year quit 2001; no passive exposure.     Alcohol use No     Family History   Problem Relation Age of Onset     CANCER Father      lung     Hypertension Mother      CANCER Maternal  Grandfather          Current Outpatient Prescriptions   Medication Sig Dispense Refill     Ascorbic Acid (VITAMIN C PO)        aspirin (ASPIRIN ADULT LOW STRENGTH) 81 MG chewable tablet Take 162 mg by mouth daily  36 tablet      Citalopram Hydrobromide (CELEXA PO) Take 20 mg by mouth       cyanocolbalamin (VITAMIN  B-12) 100 MCG tablet Take 50 mcg by mouth daily.       GABAPENTIN PO Take 600 mg by mouth 3 times daily       Omega-3 Fatty Acids (OMEGA-3 FISH OIL PO) Take 1 capsule by mouth daily        Pantoprazole Sodium (PROTONIX PO) Take 40 mg by mouth       simvastatin (ZOCOR) 40 MG tablet TAKE ONE TABLET BY MOUTH AT BEDTIME 90 tablet 0     zolpidem (AMBIEN) 10 MG tablet TAKE 1 TABLET BY MOUTH AT BEDTIME AS NEEDED FOR SLEEP 30 tablet 0     Allergies   Allergen Reactions     Codeine Nausea and Vomiting     Codeine Phosphate      Quinolones      Pt intolerance to steroids also     Tamiflu [Oseltamivir] Other (See Comments) and GI Disturbance     Stomach ache and headache        Reviewed and updated as needed this visit by clinical staff  Tobacco  Allergies  Meds  Problems  Med Hx  Surg Hx  Fam Hx  Soc Hx        Reviewed and updated as needed this visit by Provider         ROS:  Constitutional, HEENT, cardiovascular, pulmonary, gi and gu systems are negative, except as otherwise noted.    OBJECTIVE:     /72 (BP Location: Right arm, Patient Position: Sitting, Cuff Size: Adult Regular)  Pulse 53  Temp 98.3  F (36.8  C) (Tympanic)  Resp 22  Wt 178 lb (80.7 kg)  SpO2 96%  BMI 31.53 kg/m2  Body mass index is 31.53 kg/(m^2).  Physical Exam   Constitutional: She is oriented to person, place, and time. She appears well-developed and well-nourished. No distress.   HENT:   Head: Normocephalic and atraumatic.   There is a nontender depressed area noted in the right parietal region.  No surrounding changes.   Neurological: She is alert and oriented to person, place, and time.   Skin: Skin is warm and dry.    Psychiatric: She has a normal mood and affect.         Diagnostic Test Results:  none     ASSESSMENT/PLAN:     Skull lesion  Potential etiologies discussed.  I am concerned that this may represent melanoma.  CT scheduled.  Follow up post testing.  - CT Head w/o Contrast; Future    Colon cancer screening  Appointment with General Surgery scheduled for evaluation and recommendations for further management.   - GENERAL SURG ADULT REFERRAL              Sam Tanner MD  [facility]

## 2018-05-15 NOTE — NURSING NOTE
"Chief Complaint   Patient presents with     Derm Problem       Initial /72 (BP Location: Right arm, Patient Position: Sitting, Cuff Size: Adult Regular)  Pulse 53  Temp 98.3  F (36.8  C) (Tympanic)  Resp 22  Wt 178 lb (80.7 kg)  SpO2 96%  BMI 31.53 kg/m2 Estimated body mass index is 31.53 kg/(m^2) as calculated from the following:    Height as of 8/8/17: 5' 3\" (1.6 m).    Weight as of this encounter: 178 lb (80.7 kg).  Medication Reconciliation: complete    Bridget Cormier LPN    "

## 2018-05-16 ENCOUNTER — HOSPITAL ENCOUNTER (OUTPATIENT)
Dept: CT IMAGING | Facility: HOSPITAL | Age: 63
Discharge: HOME | End: 2018-05-16
Attending: FAMILY MEDICINE | Admitting: FAMILY MEDICINE
Payer: MEDICARE

## 2018-05-16 DIAGNOSIS — M89.9 SKULL LESION: ICD-10-CM

## 2018-05-16 PROCEDURE — 70450 CT HEAD/BRAIN W/O DYE: CPT | Mod: TC

## 2018-05-16 ASSESSMENT — ANXIETY QUESTIONNAIRES: GAD7 TOTAL SCORE: 2

## 2018-05-16 ASSESSMENT — PATIENT HEALTH QUESTIONNAIRE - PHQ9: SUM OF ALL RESPONSES TO PHQ QUESTIONS 1-9: 5

## 2018-05-17 ENCOUNTER — TELEPHONE (OUTPATIENT)
Dept: FAMILY MEDICINE | Facility: OTHER | Age: 63
End: 2018-05-17

## 2018-05-17 DIAGNOSIS — R93.89 ABNORMAL CT SCAN: Primary | ICD-10-CM

## 2018-05-17 NOTE — TELEPHONE ENCOUNTER
Patient had a scan done yesterday and is wondering if she can get the results. Patient advised that  will be out of office until 05/23/2018.

## 2018-05-17 NOTE — TELEPHONE ENCOUNTER
Changes in the skull are most consistent with hyperparathyroidism though there can be other issues  Such as multiple myeloma as well. Recommend other lab testing. Orders for blood work and urine testing are placed in the labs to come in when you can.

## 2018-05-18 ENCOUNTER — TELEPHONE (OUTPATIENT)
Dept: FAMILY MEDICINE | Facility: OTHER | Age: 63
End: 2018-05-18

## 2018-05-18 NOTE — TELEPHONE ENCOUNTER
10:04 AM    Reason for Call: OVERBOOK    Patient is having the following symptoms: follow up results for MRI for 1 weeks.    The patient is requesting an appointment for 05/23/18 with .    Was an appointment offered for this call? No  If yes : Appointment type              Date    Preferred method for responding to this message: Telephone Call  What is your phone number ?[phone]    If we cannot reach you directly, may we leave a detailed response at the number you provided? Yes    Can this message wait until your PCP/provider returns, if unavailable today? No, PCP is out     Kellie Benjamin

## 2018-05-25 ENCOUNTER — OFFICE VISIT (OUTPATIENT)
Dept: FAMILY MEDICINE | Facility: OTHER | Age: 63
End: 2018-05-25
Attending: FAMILY MEDICINE
Payer: MEDICARE

## 2018-05-25 VITALS
WEIGHT: 178 LBS | SYSTOLIC BLOOD PRESSURE: 116 MMHG | RESPIRATION RATE: 16 BRPM | HEART RATE: 50 BPM | HEIGHT: 65 IN | OXYGEN SATURATION: 97 % | BODY MASS INDEX: 29.66 KG/M2 | TEMPERATURE: 98.3 F | DIASTOLIC BLOOD PRESSURE: 60 MMHG

## 2018-05-25 DIAGNOSIS — R93.89 ABNORMAL CT SCAN: ICD-10-CM

## 2018-05-25 DIAGNOSIS — M89.9 SKULL LESION: Primary | ICD-10-CM

## 2018-05-25 LAB
CALCIUM SERPL-MCNC: 8.9 MG/DL (ref 8.5–10.1)
PTH-INTACT SERPL-MCNC: 59 PG/ML (ref 18–80)

## 2018-05-25 PROCEDURE — 83970 ASSAY OF PARATHORMONE: CPT | Mod: ZL | Performed by: FAMILY MEDICINE

## 2018-05-25 PROCEDURE — 84165 PROTEIN E-PHORESIS SERUM: CPT | Mod: ZL | Performed by: FAMILY MEDICINE

## 2018-05-25 PROCEDURE — 00000402 ZZHCL STATISTIC TOTAL PROTEIN: Mod: ZL | Performed by: FAMILY MEDICINE

## 2018-05-25 PROCEDURE — 36415 COLL VENOUS BLD VENIPUNCTURE: CPT | Mod: ZL | Performed by: FAMILY MEDICINE

## 2018-05-25 PROCEDURE — 82310 ASSAY OF CALCIUM: CPT | Mod: ZL | Performed by: FAMILY MEDICINE

## 2018-05-25 PROCEDURE — 84166 PROTEIN E-PHORESIS/URINE/CSF: CPT | Mod: ZL | Performed by: FAMILY MEDICINE

## 2018-05-25 PROCEDURE — 99213 OFFICE O/P EST LOW 20 MIN: CPT | Performed by: FAMILY MEDICINE

## 2018-05-25 PROCEDURE — G0463 HOSPITAL OUTPT CLINIC VISIT: HCPCS

## 2018-05-25 ASSESSMENT — PAIN SCALES - GENERAL: PAINLEVEL: NO PAIN (0)

## 2018-05-25 NOTE — NURSING NOTE
"Chief Complaint   Patient presents with     Results     Follow up MRI       Initial /60 (BP Location: Right arm, Patient Position: Sitting, Cuff Size: Adult Large)  Pulse 50  Temp 98.3  F (36.8  C) (Tympanic)  Resp 16  Ht 5' 5\" (1.651 m)  Wt 178 lb (80.7 kg)  SpO2 97%  BMI 29.62 kg/m2 Estimated body mass index is 29.62 kg/(m^2) as calculated from the following:    Height as of this encounter: 5' 5\" (1.651 m).    Weight as of this encounter: 178 lb (80.7 kg).  Medication Reconciliation: complete       Radha Kwan LPN    "

## 2018-05-25 NOTE — PROGRESS NOTES
SUBJECTIVE:   Mary Pemberton is a 62 year old female who presents to clinic today for the following health issues:      Lump/ indent on head      Duration: noticed 3 days ago    Description (location/character/radiation): small lump on top of head with small indent behind it    Intensity:  N/A    Accompanying signs and symptoms: N/A    History (similar episodes/previous evaluation): had surgery on that area of skull years ago    Precipitating or alleviating factors: N/A    Therapies tried and outcome: N/A     Mary has a history of malignant melanoma and has had previous brain lesion biopsy.  She has noticed an indentation over the right parietal region.  No associated symptoms.  CT was performed which showed:    Diffusely thickened skull with of multiple small  lucencies. This most likely associated with hyperparathyroidism  however multiple myeloma may have a similar appearance      She underwent serum parathyroid hormone level which was normal.  In addition, she recent had UPEP which is pending.    Problem list and histories reviewed & adjusted, as indicated.  Additional history: as documented    Patient Active Problem List   Diagnosis     Malignant melanoma, metastatic (H)     Dyslipidemia     Major depressive disorder, recurrent episode, mild (H)     GERD (gastroesophageal reflux disease)     Nonspecific abnormal electrocardiogram (ECG) (EKG)     Prediabetes     OAB (overactive bladder)     Cognitive deficit following cerebrovascular accident (CVA)     ACP (advance care planning)     TIA (transient ischemic attack)     Superficial siderosis of central nervous system     Obesity     Partial idiopathic epilepsy with seizures of localized onset, not intractable, with status epilepticus (H)     Seizure disorder (H)     Past Surgical History:   Procedure Laterality Date     arm      LT     brain bx       COLONOSCOPY  [id number]     open heart         Social History   Substance Use Topics     Smoking status: Former  "Smoker     Packs/day: 1.00     Years: 20.00     Types: Cigarettes     Quit date: 6/6/2002     Smokeless tobacco: Never Used      Comment: year quit 2001; no passive exposure.     Alcohol use No     Family History   Problem Relation Age of Onset     CANCER Father      lung     Hypertension Mother      CANCER Maternal Grandfather          Current Outpatient Prescriptions   Medication Sig Dispense Refill     Ascorbic Acid (VITAMIN C PO)        aspirin (ASPIRIN ADULT LOW STRENGTH) 81 MG chewable tablet Take 162 mg by mouth daily  36 tablet      Citalopram Hydrobromide (CELEXA PO) Take 20 mg by mouth       cyanocolbalamin (VITAMIN  B-12) 100 MCG tablet Take 50 mcg by mouth daily.       GABAPENTIN PO Take 600 mg by mouth 3 times daily       Omega-3 Fatty Acids (OMEGA-3 FISH OIL PO) Take 1 capsule by mouth daily        Pantoprazole Sodium (PROTONIX PO) Take 40 mg by mouth       simvastatin (ZOCOR) 40 MG tablet TAKE ONE TABLET BY MOUTH AT BEDTIME 90 tablet 0     zolpidem (AMBIEN) 10 MG tablet TAKE 1 TABLET BY MOUTH AT BEDTIME AS NEEDED FOR SLEEP 30 tablet 0     Allergies   Allergen Reactions     Codeine Nausea and Vomiting     Codeine Phosphate      Quinolones      Pt intolerance to steroids also     Tamiflu [Oseltamivir] Other (See Comments) and GI Disturbance     Stomach ache and headache        Reviewed and updated as needed this visit by clinical staff  Tobacco  Allergies  Meds  Problems       Reviewed and updated as needed this visit by Provider         ROS:  Constitutional, HEENT, cardiovascular, pulmonary, gi and gu systems are negative, except as otherwise noted.    OBJECTIVE:     /60 (BP Location: Right arm, Patient Position: Sitting, Cuff Size: Adult Large)  Pulse 50  Temp 98.3  F (36.8  C) (Tympanic)  Resp 16  Ht 5' 5\" (1.651 m)  Wt 178 lb (80.7 kg)  SpO2 97%  BMI 29.62 kg/m2  Body mass index is 29.62 kg/(m^2).  Physical Exam   Constitutional: She is oriented to person, place, and time. She " appears well-developed and well-nourished. No distress.   HENT:   Head: Normocephalic and atraumatic.   There is a nontender depressed area noted in the right parietal region.  No surrounding changes.   Neurological: She is alert and oriented to person, place, and time.   Skin: Skin is warm and dry.   Psychiatric: She has a normal mood and affect.         Diagnostic Test Results:  none     ASSESSMENT/PLAN:     Skull lesion  Etiology undetermined.  Will discuss with Oncology at [facility].  This was reviewed in detail.  Follow up one month.                  Sam Tanner MD  [facility]

## 2018-05-25 NOTE — MR AVS SNAPSHOT
"              After Visit Summary   5/25/2018    Mary Pemberton    MRN: [id number]           Patient Information     Date Of Birth          1955        Visit Information        Provider Department      5/25/2018 1:40 PM Sam Tanner MD [facility]        Today's Diagnoses     Skull lesion    -  1      Care Instructions    Will discuss CT results with Oncology          Follow-ups after your visit        Follow-up notes from your care team     Return in about 1 month (around 6/25/2018) for Follow up visit.      Your next 10 appointments already scheduled     Oct 05, 2018 10:40 AM CDT   (Arrive by 10:25 AM)   SHORT with Sam Tanner MD   [facility] ([facility] [place] )    [address]  [place] [address]   [phone]              Who to contact     If you have questions or need follow up information about today's clinic visit or your schedule please contact [facility] directly at [phone].  Normal or non-critical lab and imaging results will be communicated to you by MyChart, letter or phone within 4 business days after the clinic has received the results. If you do not hear from us within 7 days, please contact the clinic through [employer] or phone. If you have a critical or abnormal lab result, we will notify you by phone as soon as possible.  Submit refill requests through [employer] or call your pharmacy and they will forward the refill request to us. Please allow 3 business days for your refill to be completed.          Additional Information About Your Visit        MyChart Information     [employer] lets you send messages to your doctor, view your test results, renew your prescriptions, schedule appointments and more. To sign up, go to [email or web address] . Click on \"Log in\" on the left side of the screen, which will take you to the Welcome page. Then click on \"Sign up Now\" on the right side of the page.     You will be asked to enter the " "access code listed below, as well as some personal information. Please follow the directions to create your username and password.     Your access code is: [email or web address]  Expires: 2018  6:43 AM     Your access code will  in 90 days. If you need help or a new code, please call your [facility] clinic or [phone].        Care EveryWhere ID     This is your Care EveryWhere ID. This could be used by other organizations to access your [facility] medical records  [id number]        Your Vitals Were     Pulse Temperature Respirations Height Pulse Oximetry BMI (Body Mass Index)    50 98.3  F (36.8  C) (Tympanic) 16 5' 5\" (1.651 m) 97% 29.62 kg/m2       Blood Pressure from Last 3 Encounters:   18 116/60   05/15/18 120/72   18 110/64    Weight from Last 3 Encounters:   18 178 lb (80.7 kg)   05/15/18 178 lb (80.7 kg)   18 178 lb (80.7 kg)              Today, you had the following     No orders found for display       Primary Care Provider Office Phone # Fax #    Sam Tanner [phone]       [facility] [address]        Equal Access to Services     [facility]: Hadii emilee gregg hadasho Soomaali, waaxda luqadaha, qaybta kaalmada adeegyada, kelly sutherland . So [facility] [phone].    ATENCIÓN: Si habla español, tiene a mckinnon disposición servicios gratuitos de asistencia lingüística. Llame al [phone].    We comply with applicable federal civil rights laws and [place] laws. We do not discriminate on the basis of race, color, national origin, age, disability, sex, sexual orientation, or gender identity.            Thank you!     Thank you for choosing [facility]  for your care. Our goal is always to provide you with excellent care. Hearing back from our patients is one way we can continue to improve our services. Please take a few minutes to complete the written survey that you may receive in the mail after your visit " with us. Thank you!             Your Updated Medication List - Protect others around you: Learn how to safely use, store and throw away your medicines at www.disposemymeds.org.          This list is accurate as of 5/25/18 11:59 PM.  Always use your most recent med list.                   Brand Name Dispense Instructions for use Diagnosis    ASPIRIN ADULT LOW STRENGTH 81 MG chewable tablet   Generic drug:  aspirin     36 tablet    Take 162 mg by mouth daily    CVA (cerebral infarction)       CELEXA PO      Take 20 mg by mouth        cyanocolbalamin 100 MCG tablet    vitamin  B-12     Take 50 mcg by mouth daily.        GABAPENTIN PO      Take 600 mg by mouth 3 times daily        OMEGA-3 FISH OIL PO      Take 1 capsule by mouth daily        PROTONIX PO      Take 40 mg by mouth        simvastatin 40 MG tablet    ZOCOR    90 tablet    TAKE ONE TABLET BY MOUTH AT BEDTIME    Hyperlipidemia LDL goal <160       VITAMIN C PO           zolpidem 10 MG tablet    AMBIEN    30 tablet    TAKE 1 TABLET BY MOUTH AT BEDTIME AS NEEDED FOR SLEEP    Persistent insomnia

## 2018-05-28 DIAGNOSIS — F32.A DEPRESSION, UNSPECIFIED DEPRESSION TYPE: Primary | ICD-10-CM

## 2018-05-28 DIAGNOSIS — F32.A DEPRESSION: ICD-10-CM

## 2018-05-29 LAB — PROT PATTERN UR ELPH-IMP: NORMAL

## 2018-05-29 NOTE — TELEPHONE ENCOUNTER
Celexa  Last Written Prescription Date: unknown, not prescribed here  Last Fill Quantity: unknown # of Refills: unknown  Last Office Visit: 5/25/18

## 2018-05-30 LAB
ALBUMIN SERPL ELPH-MCNC: 4.4 G/DL (ref 3.7–5.1)
ALPHA1 GLOB SERPL ELPH-MCNC: 0.3 G/DL (ref 0.2–0.4)
ALPHA2 GLOB SERPL ELPH-MCNC: 0.7 G/DL (ref 0.5–0.9)
B-GLOBULIN SERPL ELPH-MCNC: 0.7 G/DL (ref 0.6–1)
GAMMA GLOB SERPL ELPH-MCNC: 0.8 G/DL (ref 0.7–1.6)
M PROTEIN SERPL ELPH-MCNC: 0 G/DL
PROT PATTERN SERPL ELPH-IMP: NORMAL

## 2018-05-30 RX ORDER — CITALOPRAM HYDROBROMIDE 20 MG/1
TABLET ORAL
Qty: 90 TABLET | Refills: 1 | Status: SHIPPED | OUTPATIENT
Start: 2018-05-30 | End: 2018-12-07

## 2018-05-30 NOTE — TELEPHONE ENCOUNTER
Please advise on Celexa.  Historically noted on current medication list.  Last signed: 11/16/17 #90,1  R.  Medication discontinued 2/15/18 for reason none.  I am not seeing notes in regards to this.  Please advise.  Thank you.

## 2018-05-31 ENCOUNTER — RADIANT APPOINTMENT (OUTPATIENT)
Dept: MAMMOGRAPHY | Facility: OTHER | Age: 63
End: 2018-05-31
Attending: FAMILY MEDICINE
Payer: MEDICARE

## 2018-05-31 DIAGNOSIS — Z12.31 VISIT FOR SCREENING MAMMOGRAM: ICD-10-CM

## 2018-05-31 PROCEDURE — 77067 SCR MAMMO BI INCL CAD: CPT | Mod: TC

## 2018-06-11 DIAGNOSIS — G47.00 PERSISTENT INSOMNIA: ICD-10-CM

## 2018-06-11 RX ORDER — ZOLPIDEM TARTRATE 10 MG/1
TABLET ORAL
Qty: 30 TABLET | Refills: 0 | Status: SHIPPED | OUTPATIENT
Start: 2018-06-11 | End: 2018-07-17

## 2018-06-11 NOTE — TELEPHONE ENCOUNTER
Ambien  Last Written Prescription Date:  5/3/18  Last Fill Qty:  30, # Refills:  0  Last Office Visit:  5/25/18

## 2018-06-25 ENCOUNTER — TELEPHONE (OUTPATIENT)
Dept: FAMILY MEDICINE | Facility: OTHER | Age: 63
End: 2018-06-25

## 2018-06-25 NOTE — TELEPHONE ENCOUNTER
9:00 AM    Reason for Call: Phone Call    Description: Pt called and states that she was supposed to get some tests done and a colonoscopy and has not heard anything on that yet.    Was an appointment offered for this call? No  If yes : Appointment type              Date    Preferred method for responding to this message: Telephone Call  What is your phone number ?[phone]    If we cannot reach you directly, may we leave a detailed response at the number you provided? Yes    Can this message wait until your PCP/provider returns, if available today? Not applicable, PCP is in     Kellie Bradley

## 2018-06-29 ENCOUNTER — TELEPHONE (OUTPATIENT)
Dept: FAMILY MEDICINE | Facility: OTHER | Age: 63
End: 2018-06-29

## 2018-07-08 ENCOUNTER — HEALTH MAINTENANCE LETTER (OUTPATIENT)
Age: 63
End: 2018-07-08

## 2018-07-13 ENCOUNTER — OFFICE VISIT (OUTPATIENT)
Dept: FAMILY MEDICINE | Facility: OTHER | Age: 63
End: 2018-07-13
Attending: FAMILY MEDICINE
Payer: MEDICARE

## 2018-07-13 VITALS
OXYGEN SATURATION: 96 % | HEART RATE: 65 BPM | WEIGHT: 175.8 LBS | DIASTOLIC BLOOD PRESSURE: 70 MMHG | SYSTOLIC BLOOD PRESSURE: 102 MMHG | TEMPERATURE: 98.4 F | BODY MASS INDEX: 29.25 KG/M2

## 2018-07-13 DIAGNOSIS — R35.0 FREQUENCY OF URINATION: ICD-10-CM

## 2018-07-13 DIAGNOSIS — R82.90 ABNORMAL URINE FINDINGS: ICD-10-CM

## 2018-07-13 DIAGNOSIS — Z12.11 COLON CANCER SCREENING: Primary | ICD-10-CM

## 2018-07-13 DIAGNOSIS — C43.9 METASTATIC MALIGNANT MELANOMA (H): ICD-10-CM

## 2018-07-13 DIAGNOSIS — M89.9 SKULL LESION: ICD-10-CM

## 2018-07-13 LAB
ALBUMIN UR-MCNC: NEGATIVE MG/DL
APPEARANCE UR: CLEAR
BACTERIA #/AREA URNS HPF: ABNORMAL /HPF
BILIRUB UR QL STRIP: NEGATIVE
COLOR UR AUTO: ABNORMAL
GLUCOSE UR STRIP-MCNC: NEGATIVE MG/DL
HGB UR QL STRIP: NEGATIVE
KETONES UR STRIP-MCNC: NEGATIVE MG/DL
LEUKOCYTE ESTERASE UR QL STRIP: ABNORMAL
MUCOUS THREADS #/AREA URNS LPF: PRESENT /LPF
NITRATE UR QL: NEGATIVE
PH UR STRIP: 5.5 PH (ref 4.7–8)
RBC #/AREA URNS AUTO: 1 /HPF (ref 0–2)
SOURCE: ABNORMAL
SP GR UR STRIP: 1 (ref 1–1.03)
SQUAMOUS #/AREA URNS AUTO: 1 /HPF (ref 0–1)
UROBILINOGEN UR STRIP-MCNC: NORMAL MG/DL (ref 0–2)
WBC #/AREA URNS AUTO: 14 /HPF (ref 0–5)

## 2018-07-13 PROCEDURE — 99214 OFFICE O/P EST MOD 30 MIN: CPT | Performed by: FAMILY MEDICINE

## 2018-07-13 PROCEDURE — G0463 HOSPITAL OUTPT CLINIC VISIT: HCPCS

## 2018-07-13 PROCEDURE — 81001 URINALYSIS AUTO W/SCOPE: CPT | Mod: ZL | Performed by: FAMILY MEDICINE

## 2018-07-13 PROCEDURE — 87086 URINE CULTURE/COLONY COUNT: CPT | Mod: ZL | Performed by: FAMILY MEDICINE

## 2018-07-13 RX ORDER — TOLTERODINE TARTRATE 2 MG/1
2 TABLET, EXTENDED RELEASE ORAL 2 TIMES DAILY
Qty: 60 TABLET | Refills: 1 | Status: SHIPPED | OUTPATIENT
Start: 2018-07-13 | End: 2018-07-19

## 2018-07-13 ASSESSMENT — ANXIETY QUESTIONNAIRES
GAD7 TOTAL SCORE: 0
2. NOT BEING ABLE TO STOP OR CONTROL WORRYING: NOT AT ALL
3. WORRYING TOO MUCH ABOUT DIFFERENT THINGS: NOT AT ALL
7. FEELING AFRAID AS IF SOMETHING AWFUL MIGHT HAPPEN: NOT AT ALL
6. BECOMING EASILY ANNOYED OR IRRITABLE: NOT AT ALL
4. TROUBLE RELAXING: NOT AT ALL
1. FEELING NERVOUS, ANXIOUS, OR ON EDGE: NOT AT ALL
5. BEING SO RESTLESS THAT IT IS HARD TO SIT STILL: NOT AT ALL

## 2018-07-13 ASSESSMENT — PAIN SCALES - GENERAL: PAINLEVEL: NO PAIN (0)

## 2018-07-13 NOTE — NURSING NOTE
"Chief Complaint   Patient presents with     Follow Up For     MRI       Initial /70 (BP Location: Right arm, Patient Position: Chair, Cuff Size: Adult Regular)  Pulse 65  Temp 98.4  F (36.9  C) (Tympanic)  Wt 175 lb 12.8 oz (79.7 kg)  SpO2 96%  BMI 29.25 kg/m2 Estimated body mass index is 29.25 kg/(m^2) as calculated from the following:    Height as of 5/25/18: 5' 5\" (1.651 m).    Weight as of this encounter: 175 lb 12.8 oz (79.7 kg).  Medication Reconciliation: complete    Fabienne Valles    "

## 2018-07-13 NOTE — PROGRESS NOTES
SUBJECTIVE:   Mary Pemberton is a 62 year old female who presents to clinic today for the following health issues:      Lump/ indent on head      Duration: noticed 3 days ago    Description (location/character/radiation): small lump on top of head with small indent behind it    Intensity:  N/A    Accompanying signs and symptoms: N/A    History (similar episodes/previous evaluation): had surgery on that area of skull years ago    Precipitating or alleviating factors: N/A    Therapies tried and outcome: N/A     Mary has a history of malignant melanoma and has had previous brain lesion biopsy.  She has noticed an indentation over the right parietal region.  No associated symptoms.  CT was performed which showed:    Diffusely thickened skull with of multiple small  lucencies. This most likely associated with hyperparathyroidism  however multiple myeloma may have a similar appearance      She underwent serum parathyroid hormone level which was normal.  In addition, she recent had UPEP which is pending.    URINARY TRACT SYMPTOMS      Duration: 1 week    Description  dysuria, frequency and urgency    Intensity:  mild    Accompanying signs and symptoms:  Fever/chills: no   Flank pain no   Nausea and vomiting: no   Vaginal symptoms: none  Abdominal/Pelvic Pain: no     History  History of frequent UTI's: no   History of kidney stones: no   Sexually Active: no   Possibility of pregnancy: No    Precipitating or alleviating factors: None    Therapies tried and outcome: none        Colonoscopy      Duration: NA    Description (location/character/radiation): NA    Intensity:  NA    Accompanying signs and symptoms: None    History (similar episodes/previous evaluation): None    Precipitating or alleviating factors: None    Therapies tried and outcome: None      Mary is in need of screening colonoscopy.  This is to be sheduled.  Problem list and histories reviewed & adjusted, as indicated.  Additional history: as  documented    Patient Active Problem List   Diagnosis     Malignant melanoma, metastatic (H)     Dyslipidemia     Major depressive disorder, recurrent episode, mild (H)     GERD (gastroesophageal reflux disease)     Nonspecific abnormal electrocardiogram (ECG) (EKG)     Prediabetes     OAB (overactive bladder)     Cognitive deficit following cerebrovascular accident (CVA)     ACP (advance care planning)     TIA (transient ischemic attack)     Superficial siderosis of central nervous system     Obesity     Partial idiopathic epilepsy with seizures of localized onset, not intractable, with status epilepticus (H)     Seizure disorder (H)     Past Surgical History:   Procedure Laterality Date     arm      LT     brain bx       COLONOSCOPY  [id number]     open heart         Social History   Substance Use Topics     Smoking status: Former Smoker     Packs/day: 1.00     Years: 20.00     Types: Cigarettes     Quit date: 6/6/2002     Smokeless tobacco: Never Used      Comment: year quit 2001; no passive exposure.     Alcohol use No     Family History   Problem Relation Age of Onset     Cancer Father      lung     Hypertension Mother      Cancer Maternal Grandfather          Current Outpatient Prescriptions   Medication Sig Dispense Refill     Ascorbic Acid (VITAMIN C PO)        aspirin (ASPIRIN ADULT LOW STRENGTH) 81 MG chewable tablet Take 162 mg by mouth daily  36 tablet      citalopram (CELEXA) 20 MG tablet TAKE ONE TABLET BY MOUTH ONCE DAILY 90 tablet 1     Citalopram Hydrobromide (CELEXA PO) Take 20 mg by mouth       cyanocolbalamin (VITAMIN  B-12) 100 MCG tablet Take 50 mcg by mouth daily.       GABAPENTIN PO Take 600 mg by mouth 3 times daily       Omega-3 Fatty Acids (OMEGA-3 FISH OIL PO) Take 1 capsule by mouth daily        Pantoprazole Sodium (PROTONIX PO) Take 40 mg by mouth       simvastatin (ZOCOR) 40 MG tablet TAKE 1 TABLET BY MOUTH AT BEDTIME 90 tablet 1     zolpidem (AMBIEN) 10 MG tablet TAKE ONE TABLET BY  MOUTH AT BEDTIME AS NEEDED FOR SLEEP 30 tablet 0     Allergies   Allergen Reactions     Codeine Nausea and Vomiting     Codeine Phosphate      Quinolones      Pt intolerance to steroids also     Tamiflu [Oseltamivir] Other (See Comments) and GI Disturbance     Stomach ache and headache        Reviewed and updated as needed this visit by clinical staff  Tobacco  Allergies  Meds  Med Hx  Surg Hx  Fam Hx  Soc Hx      Reviewed and updated as needed this visit by Provider         ROS:  Constitutional, HEENT, cardiovascular, pulmonary, gi and gu systems are negative, except as otherwise noted.    OBJECTIVE:     /70 (BP Location: Right arm, Patient Position: Chair, Cuff Size: Adult Regular)  Pulse 65  Temp 98.4  F (36.9  C) (Tympanic)  Wt 175 lb 12.8 oz (79.7 kg)  SpO2 96%  BMI 29.25 kg/m2  Body mass index is 29.25 kg/(m^2).  Physical Exam   Constitutional: She is oriented to person, place, and time. She appears well-developed and well-nourished. No distress.   HENT:   Head: Normocephalic and atraumatic.   There is a nontender depressed area noted in the right parietal region.  No surrounding changes.   Neurological: She is alert and oriented to person, place, and time.   Skin: Skin is warm and dry.   Psychiatric: She has a normal mood and affect.         Diagnostic Test Results:  UA reviewed    ASSESSMENT/PLAN:     Skull lesion  Previous records reviewed.  Appointment with Oncology scheduled for evaluation and recommendations for further management.   - ONC/HEME ADULT REFERRAL    Malignant melanoma, metastatic (H)  As above  - ONC/HEME ADULT REFERRAL    Frequency of urination  UA reviewed.  - UA reflex to Microscopic and Culture    Abnormal urine findings  Await culture  - Urine Culture Aerobic Bacterial    Colon cancer screening  Referral to Gastroenterology   - GASTROENTEROLOGY PEDS REFERRAL +/- PROCEDURE                  Sam Tanner MD  [facility]

## 2018-07-13 NOTE — MR AVS SNAPSHOT
After Visit Summary   7/13/2018    Mary Pemberton    MRN: [id number]           Patient Information     Date Of Birth          1955        Visit Information        Provider Department      7/13/2018 1:20 PM Sam Tanner MD [facility]        Today's Diagnoses     Colon cancer screening    -  1    Skull lesion        Malignant melanoma, metastatic (H)        Frequency of urination        Abnormal urine findings          Care Instructions    Appointment with Oncology scheduled for evaluation and recommendations for further management.           Follow-ups after your visit        Additional Services     GASTROENTEROLOGY PEDS REFERRAL +/- PROCEDURE       Your provider has referred you to Gastroenterology Services.    Dr Morel/Jordyn here    Colonoscopy  English    Procedure/Referral: PROCEDURE ONLY - COLONOSCOPY - Reason for procedure: Screening     Please be aware that coverage of these services is subject to the terms and limitations of your health insurance plan.  Call member services at your health plan with any benefit or coverage questions.  Any procedures must be performed at a [place] facility OR coordinated by your clinic's referral office.    Please bring the following with you to your appointment:    (1) Any X-Rays, CTs or MRIs which have been performed.  Contact the facility where they were done to arrange for  prior to your scheduled appointment.    (2) List of current medications   (3) This referral request   (4) Any documents/labs given to you for this referral            ONC/HEME ADULT REFERRAL       Your provider has referred you to: N: [facility] [place] [facility] [phone]   [email or web address]    Please be aware that coverage of these services is subject to the terms and limitations of your health insurance plan.  Call member services at your health plan with any benefit or coverage  questions.      Please bring the following with you to your appointment:    (1) Any X-Rays, CTs or MRIs which have been performed.  Contact the facility where they were done to arrange for  prior to your scheduled appointment.   (2) List of current medications  (3) This referral request   (4) Any documents/labs given to you for this referral                  Follow-up notes from your care team     Return in about 6 months (around 1/13/2019) for Follow up visit.      Your next 10 appointments already scheduled     Aug 15, 2018  9:00 AM CDT   (Arrive by 8:45 AM)   NM INJECTION with [id number]   [place] NUCLEAR MEDICINE ([facility] )    [address] [id number] [phone]            Aug 15, 2018 12:00 PM CDT   (Arrive by 11:45 AM)   NM BONE SCAN WHOLE BODY with HINM1   [facility] NUCLEAR MEDICINE ([facility] )    [address] [id number] [phone]           Please bring a list of your medicines to the exam. (Include vitamins, minerals and over-the-counter drugs.) You should wear comfortable clothes. Leave your valuables at home. Please bring related prior results and films.  Tell your doctor:   If you are breastfeeding or may be pregnant.   If you have had a barium test within the past 48 hours. Barium may change the results of certain exams.   If you think you may need sedation (medicine to help you relax).  You may eat and drink as normal.  Please call your Imaging Department at your exam site with any questions.            Aug 15, 2018  2:00 PM CDT   (Arrive by 1:00 PM)   CT SOFT TISSUE NECK W CONTRAST with HICT1   HI CT SCAN ([facility] )    [address] [id number] [phone]           Please bring any scans or X-rays taken at other hospitals, if similar tests were done. Also bring a list of your medicines, including vitamins, minerals and over-the-counter drugs. It is safest to leave personal items at home.  Be sure  to tell your doctor:   If you have any allergies.   If there s any chance you are pregnant.   If you are breastfeeding.    If you have diabetes as your medication may need to be adjusted for this exam.  You will have contrast for this exam. To prepare:   Do not eat or drink for 2 hours before your exam. If you need to take medicine, you may take it with small sips of water. (We may ask you to take liquid medicine as well.)   The day before your exam, drink extra fluids at least six 8-ounce glasses (unless your doctor tells you to restrict your fluids).  Patients over 70 or patients with diabetes or kidney problems:   If you haven t had a blood test (creatinine test) within the last 30 days, the Cardiologist/Radiologist may require you to get this test prior to your exam.  Please wear loose clothing, such as a sweat suit or jogging clothes. Avoid snaps, zippers and other metal. We may ask you to undress and put on a hospital gown.  If you have any questions, please call the Imaging Department where you will have your exam.            Aug 15, 2018  2:30 PM CDT   (Arrive by 2:15 PM)   CT CHEST/ABDOMEN/PELVIS W CONTRAST with HICT1   HI CT SCAN ([facility] )    [address] [place] [id number] [phone]           Please bring any scans or X-rays taken at other hospitals, if similar tests were done. Also bring a list of your medicines, including vitamins, minerals and over-the-counter drugs. It is safest to leave personal items at home.  Be sure to tell your doctor:   If you have any allergies.   If there s any chance you are pregnant.   If you are breastfeeding.  How to prepare:   Do not eat or drink for 2 hours before your exam. If you need to take medicine, you may take it with small sips of water. (We may ask you to take liquid medicine as well.)   Please wear loose clothing, such as a sweat suit or jogging clothes. Avoid snaps, zippers and other metal. We may ask you to undress and put on  a hospital gown.  Please arrive 30 minutes early for your CT. Once in the department you might be asked to drink water 15-20 minutes prior to your exam.  If indicated you may be asked to drink an oral contrast in advance of your CT.  If this is the case, the imaging team will let you know or be in contact with you prior to your appointment  Patients over 70 or patients with diabetes or kidney problems:   If you haven t had a blood test (creatinine test) within the last 30 days, the Cardiologist/Radiologist may require you to get this test prior to your exam.  If you have diabetes:   Continue to take your metformin medication on the day of your exam  If you have any questions, please call the Imaging Department where you will have your exam.            Aug 15, 2018  3:00 PM CDT   (Arrive by 2:45 PM)   MR BRAIN W/O & W CONTRAST with [address] MRI ([facility] )    [address] [place] [id number] [phone]           Take your medicines as usual, unless your doctor tells you not to. Bring a list of your current medicines to your exam (including vitamins, minerals and over-the-counter drugs).  You may or may not receive intravenous (IV) contrast for this exam pending the discretion of the Radiologist.  You do not need to do anything special to prepare.  The MRI machine uses a strong magnet. Please wear clothes without metal (snaps, zippers). A sweatsuit works well, or we may give you a hospital gown.  Please remove any body piercings and hair extensions before you arrive. You will also remove watches, jewelry, hairpins, wallets, dentures, partial dental plates and hearing aids. You may wear contact lenses, and you may be able to wear your rings. We have a safe place to keep your personal items, but it is safer to leave them at home.  **IMPORTANT** THE INSTRUCTIONS BELOW ARE ONLY FOR THOSE PATIENTS WHO HAVE BEEN PRESCRIBED SEDATION OR GENERAL ANESTHESIA DURING THEIR MRI PROCEDURE:  IF YOUR  DOCTOR PRESCRIBED ORAL SEDATION (take medicine to help you relax during your exam):   You must get the medicine from your doctor (oral medication) before you arrive. Bring the medicine to the exam. Do not take it at home. You ll be told when to take it upon arriving for your exam.   Arrive one hour early. Bring someone who can take you home after the test. Your medicine will make you sleepy. After the exam, you may not drive, take a bus or take a taxi by yourself.  IF YOUR DOCTOR PRESCRIBED IV SEDATION:   Arrive one hour early. Bring someone who can take you home after the test. Your medicine will make you sleepy. After the exam, you may not drive, take a bus or take a taxi by yourself.   No eating 6 hours before your exam. You may have clear liquids up until 4 hours before your exam. (Clear liquids include water, clear tea, black coffee and fruit juice without pulp.)  IF YOUR DOCTOR PRESCRIBED ANESTHESIA (be asleep for your exam):   Arrive 1 1/2 hours early. Bring someone who can take you home after the test. You may not drive, take a bus or take a taxi by yourself.   No eating 8 hours before your exam. You may have clear liquids up until 4 hours before your exam. (Clear liquids include water, clear tea, black coffee and fruit juice without pulp.)   You will spend four to five hours in the recovery room.  Please call the Imaging Department at your exam site with any questions.            Aug 20, 2018 10:30 AM CDT   (Arrive by 10:15 AM)   Return Visit with Rodriguez Smith MD   [facility] ([facility] )    [address] [place] [address]   [phone]            Oct 05, 2018 10:40 AM CDT   (Arrive by 10:25 AM)   [facility]    [address] [place] [address]   [phone]              Who to contact     If you have questions or need follow up information about today's clinic visit or your  schedule please contact [facility] directly at [phone].  Normal or non-critical lab and imaging results will be communicated to you by MyChart, letter or phone within 4 business days after the clinic has received the results. If you do not hear from us within 7 days, please contact the clinic through MyChart or phone. If you have a critical or abnormal lab result, we will notify you by phone as soon as possible.  Submit refill requests through [employer] or call your pharmacy and they will forward the refill request to us. Please allow 3 business days for your refill to be completed.          Additional Information About Your Visit        Care EveryWhere ID     This is your Care EveryWhere ID. This could be used by other organizations to access your [facility] medical records  [id number]        Your Vitals Were     Pulse Temperature Pulse Oximetry BMI (Body Mass Index)          65 98.4  F (36.9  C) (Tympanic) 96% 29.25 kg/m2         Blood Pressure from Last 3 Encounters:   07/24/18 102/68   07/13/18 102/70   05/25/18 116/60    Weight from Last 3 Encounters:   07/24/18 174 lb 6.4 oz (79.1 kg)   07/13/18 175 lb 12.8 oz (79.7 kg)   05/25/18 178 lb (80.7 kg)              We Performed the Following     GASTROENTEROLOGY PEDS REFERRAL +/- PROCEDURE     ONC/HEME ADULT REFERRAL     UA reflex to Microscopic and Culture     Urine Culture Aerobic Bacterial          Today's Medication Changes          These changes are accurate as of 7/13/18 11:59 PM.  If you have any questions, ask your nurse or doctor.               Start taking these medicines.        Dose/Directions    tolterodine 2 MG tablet   Commonly known as:  DETROL   Used for:  Frequency of urination   Started by:  Sam Tanner MD        Dose:  2 mg   Take 1 tablet (2 mg) by mouth 2 times daily   Quantity:  60 tablet   Refills:  1            Where to get your medicines      These medications were sent to [id number]  [place], [place] - [address]  [address]  [address] [address], [facility] [address]     Phone:  [phone]     tolterodine 2 MG tablet                Primary Care Provider Office Phone # Fax #    Sam Tanner [phone]       [facility] [address]        Equal Access to Services     [facility] NELLY : Hadii aad ku hadasho Soomaali, waaxda luqadaha, qaybta kaalmada adeegyada, waxay saharain hayjann adecasie perry ena bridges. So [facility] [phone].    ATENCIÓN: Si habla español, tiene a mckinnon disposición servicios gratuitos de asistencia lingüística. [facility] [phone].    We comply with applicable federal civil rights laws and [place] laws. We do not discriminate on the basis of race, color, national origin, age, disability, sex, sexual orientation, or gender identity.            Thank you!     Thank you for choosing [facility]  for your care. Our goal is always to provide you with excellent care. Hearing back from our patients is one way we can continue to improve our services. Please take a few minutes to complete the written survey that you may receive in the mail after your visit with us. Thank you!             Your Updated Medication List - Protect others around you: Learn how to safely use, store and throw away your medicines at www.disposemymeds.org.          This list is accurate as of 7/13/18 11:59 PM.  Always use your most recent med list.                   Brand Name Dispense Instructions for use Diagnosis    ASPIRIN ADULT LOW STRENGTH 81 MG chewable tablet   Generic drug:  aspirin     36 tablet    Take 162 mg by mouth daily    CVA (cerebral infarction)       * CELEXA PO      Take 20 mg by mouth        * citalopram 20 MG tablet    celeXA    90 tablet    TAKE ONE TABLET BY MOUTH ONCE DAILY    Depression, unspecified depression type       cyanocolbalamin 100 MCG tablet    vitamin  B-12     Take 50 mcg by mouth daily.        GABAPENTIN PO      Take 600 mg by mouth 3 times daily        OMEGA-3 FISH OIL PO       Take 1 capsule by mouth daily        PROTONIX PO      Take 40 mg by mouth        simvastatin 40 MG tablet    ZOCOR    90 tablet    TAKE 1 TABLET BY MOUTH AT BEDTIME    Hyperlipidemia LDL goal <160       tolterodine 2 MG tablet    DETROL    60 tablet    Take 1 tablet (2 mg) by mouth 2 times daily    Frequency of urination       VITAMIN C PO           * Notice:  This list has 2 medication(s) that are the same as other medications prescribed for you. Read the directions carefully, and ask your doctor or other care provider to review them with you.

## 2018-07-14 LAB
BACTERIA SPEC CULT: ABNORMAL
SPECIMEN SOURCE: ABNORMAL

## 2018-07-14 ASSESSMENT — ANXIETY QUESTIONNAIRES: GAD7 TOTAL SCORE: 0

## 2018-07-14 ASSESSMENT — PATIENT HEALTH QUESTIONNAIRE - PHQ9: SUM OF ALL RESPONSES TO PHQ QUESTIONS 1-9: 1

## 2018-07-17 DIAGNOSIS — G47.00 PERSISTENT INSOMNIA: ICD-10-CM

## 2018-07-17 RX ORDER — ZOLPIDEM TARTRATE 10 MG/1
TABLET ORAL
Qty: 30 TABLET | Refills: 0 | Status: SHIPPED | OUTPATIENT
Start: 2018-07-17 | End: 2018-08-24

## 2018-07-18 ENCOUNTER — TELEPHONE (OUTPATIENT)
Dept: FAMILY MEDICINE | Facility: OTHER | Age: 63
End: 2018-07-18

## 2018-07-18 DIAGNOSIS — R35.0 FREQUENCY OF URINATION: ICD-10-CM

## 2018-07-18 NOTE — TELEPHONE ENCOUNTER
Patient requesting a cheaper alternative to detrol. Per pharmacy fax oxybutynin is the cheaper choice.

## 2018-07-19 RX ORDER — OXYBUTYNIN CHLORIDE 5 MG/1
5 TABLET ORAL 2 TIMES DAILY
Qty: 60 TABLET | Refills: 1 | Status: SHIPPED | OUTPATIENT
Start: 2018-07-19

## 2018-07-19 NOTE — TELEPHONE ENCOUNTER
Spoke with [facility] pharmacist and he said closest equivalent with Oxybutynin is 5 mg twice daily.  This is the normal starting dose.  Pended medication for you to sign.  Thank you

## 2018-07-23 ENCOUNTER — TELEPHONE (OUTPATIENT)
Dept: FAMILY MEDICINE | Facility: OTHER | Age: 63
End: 2018-07-23

## 2018-07-23 NOTE — TELEPHONE ENCOUNTER
"Patient called to explain that her \"other phone\" went dead.... Please return phone call @ [phone] Thank you  "

## 2018-07-23 NOTE — TELEPHONE ENCOUNTER
10:20 AM    Reason for Call: Phone Call    Description: Pt called and states that she would like to see if she could get a call back regarding her colonoscopy    Was an appointment offered for this call? No  If yes : Appointment type              Date    Preferred method for responding to this message: Telephone Call  What is your phone number ?[phone]    If we cannot reach you directly, may we leave a detailed response at the number you provided? Yes    Can this message wait until your PCP/provider returns, if available today? Not applicable, PCP is in     [facility]

## 2018-07-24 ENCOUNTER — PATIENT OUTREACH (OUTPATIENT)
Dept: ONCOLOGY | Facility: OTHER | Age: 63
End: 2018-07-24

## 2018-07-24 ENCOUNTER — ONCOLOGY VISIT (OUTPATIENT)
Dept: ONCOLOGY | Facility: OTHER | Age: 63
End: 2018-07-24
Attending: FAMILY MEDICINE
Payer: MEDICARE

## 2018-07-24 VITALS
SYSTOLIC BLOOD PRESSURE: 102 MMHG | HEIGHT: 65 IN | OXYGEN SATURATION: 98 % | HEART RATE: 68 BPM | TEMPERATURE: 98.3 F | DIASTOLIC BLOOD PRESSURE: 68 MMHG | BODY MASS INDEX: 29.06 KG/M2 | WEIGHT: 174.4 LBS

## 2018-07-24 DIAGNOSIS — C43.9 METASTATIC MALIGNANT MELANOMA (H): Primary | ICD-10-CM

## 2018-07-24 DIAGNOSIS — C43.9 METASTATIC MALIGNANT MELANOMA (H): ICD-10-CM

## 2018-07-24 DIAGNOSIS — M89.9 SKULL LESION: ICD-10-CM

## 2018-07-24 DIAGNOSIS — Z76.89 HEALTH CARE HOME: ICD-10-CM

## 2018-07-24 LAB
ALBUMIN SERPL-MCNC: 4.1 G/DL (ref 3.4–5)
ALP SERPL-CCNC: 91 U/L (ref 40–150)
ALT SERPL W P-5'-P-CCNC: 28 U/L (ref 0–50)
ANION GAP SERPL CALCULATED.3IONS-SCNC: 9 MMOL/L (ref 3–14)
AST SERPL W P-5'-P-CCNC: 19 U/L (ref 0–45)
BASOPHILS # BLD AUTO: 0.1 10E9/L (ref 0–0.2)
BASOPHILS NFR BLD AUTO: 0.7 %
BILIRUB SERPL-MCNC: 0.3 MG/DL (ref 0.2–1.3)
BUN SERPL-MCNC: 12 MG/DL (ref 7–30)
CALCIUM SERPL-MCNC: 8.8 MG/DL (ref 8.5–10.1)
CHLORIDE SERPL-SCNC: 107 MMOL/L (ref 94–109)
CO2 SERPL-SCNC: 22 MMOL/L (ref 20–32)
CREAT SERPL-MCNC: 0.8 MG/DL (ref 0.52–1.04)
DIFFERENTIAL METHOD BLD: NORMAL
EOSINOPHIL # BLD AUTO: 0.1 10E9/L (ref 0–0.7)
EOSINOPHIL NFR BLD AUTO: 1.1 %
ERYTHROCYTE [DISTWIDTH] IN BLOOD BY AUTOMATED COUNT: 12.7 % (ref 10–15)
GFR SERPL CREATININE-BSD FRML MDRD: 72 ML/MIN/1.7M2
GLUCOSE SERPL-MCNC: 107 MG/DL (ref 70–99)
HCT VFR BLD AUTO: 38.2 % (ref 35–47)
HGB BLD-MCNC: 13 G/DL (ref 11.7–15.7)
IMM GRANULOCYTES # BLD: 0 10E9/L (ref 0–0.4)
IMM GRANULOCYTES NFR BLD: 0.4 %
LDH SERPL L TO P-CCNC: 169 U/L (ref 81–234)
LYMPHOCYTES # BLD AUTO: 2.3 10E9/L (ref 0.8–5.3)
LYMPHOCYTES NFR BLD AUTO: 32.3 %
MCH RBC QN AUTO: 31.3 PG (ref 26.5–33)
MCHC RBC AUTO-ENTMCNC: 34 G/DL (ref 31.5–36.5)
MCV RBC AUTO: 92 FL (ref 78–100)
MONOCYTES # BLD AUTO: 0.7 10E9/L (ref 0–1.3)
MONOCYTES NFR BLD AUTO: 9.4 %
NEUTROPHILS # BLD AUTO: 3.9 10E9/L (ref 1.6–8.3)
NEUTROPHILS NFR BLD AUTO: 56.1 %
NRBC # BLD AUTO: 0 10*3/UL
NRBC BLD AUTO-RTO: 0 /100
PLATELET # BLD AUTO: 270 10E9/L (ref 150–450)
POTASSIUM SERPL-SCNC: 4.3 MMOL/L (ref 3.4–5.3)
PROT SERPL-MCNC: 7.4 G/DL (ref 6.8–8.8)
RBC # BLD AUTO: 4.15 10E12/L (ref 3.8–5.2)
SODIUM SERPL-SCNC: 138 MMOL/L (ref 133–144)
WBC # BLD AUTO: 7 10E9/L (ref 4–11)

## 2018-07-24 PROCEDURE — 36415 COLL VENOUS BLD VENIPUNCTURE: CPT | Mod: ZL | Performed by: INTERNAL MEDICINE

## 2018-07-24 PROCEDURE — G0463 HOSPITAL OUTPT CLINIC VISIT: HCPCS

## 2018-07-24 PROCEDURE — 83615 LACTATE (LD) (LDH) ENZYME: CPT | Mod: ZL | Performed by: INTERNAL MEDICINE

## 2018-07-24 PROCEDURE — 80053 COMPREHEN METABOLIC PANEL: CPT | Mod: ZL | Performed by: INTERNAL MEDICINE

## 2018-07-24 PROCEDURE — 85025 COMPLETE CBC W/AUTO DIFF WBC: CPT | Mod: ZL | Performed by: INTERNAL MEDICINE

## 2018-07-24 PROCEDURE — 99205 OFFICE O/P NEW HI 60 MIN: CPT | Performed by: INTERNAL MEDICINE

## 2018-07-24 ASSESSMENT — ACTIVITIES OF DAILY LIVING (ADL): DEPENDENT_IADLS:: INDEPENDENT

## 2018-07-24 ASSESSMENT — PAIN SCALES - GENERAL: PAINLEVEL: MILD PAIN (3)

## 2018-07-24 NOTE — PROGRESS NOTES
Clinic Care Coordination Contact  Care Team Conversations    OCC RN met pt during the face to face visit today with Dr Smith.  Discussed her previous cancer history/care at the [facility].  States she received some radiation treatments locally with Dr Jinny DEJESUS Rad Therapy.  She states she feels pretty good.  Pt was accompanied today with her spouse.  Pt informed of this nurse's role and assistance.  OCC nurse to assess pt's needs ongoing.    Face to Face Oncology visit      Resources given to patient to assist with their journey with in cancer folder:    [facility] Emergency Care Plan    Care Coordinator Contact information   Honoring Choices pamphlet and discussion on advanced health care directive               ACS card   Support Group    Nutrition booklet from ACS on eating during and after chemotherapy   Aromatherapy    Vaccination information   Cancer Rehab       Financial, Billing, or insurance questions; need for financial counselor: Pt declined applications listed below.  [employer] Application    [employer] Application        Do you have any spiritual needs?  None at this time       Concerns with nutrition? None      Pharmacy   Any questions regarding your medications?   None at this time        Do you have any concerns with being able to care for yourself at home?  None at this time       Concerns with physical health?    Cancer Rehab referral made    *    Clinical Trials explained to patient and handout provided in cancer folder      Mychart explained to patient.    Patient to sign up for MyChart - Declined      Do you have any other questions or needs that we have not discussed?  None at this time    [facility] Oncology Care Coordinator

## 2018-07-24 NOTE — NURSING NOTE
"Chief Complaint   Patient presents with     Consult     skull lesion/ malignant melanoma       Initial /68 (BP Location: Right arm, Patient Position: Sitting, Cuff Size: Adult Regular)  Pulse 68  Temp 98.3  F (36.8  C) (Tympanic)  Ht 1.651 m (5' 5\")  Wt 79.1 kg (174 lb 6.4 oz)  SpO2 98%  BMI 29.02 kg/m2 Estimated body mass index is 29.02 kg/(m^2) as calculated from the following:    Height as of this encounter: 1.651 m (5' 5\").    Weight as of this encounter: 79.1 kg (174 lb 6.4 oz).  Medication Reconciliation: complete    Misti Allen LPN  "

## 2018-07-24 NOTE — LETTER
[facility]  [address]  [place] [address]  [phone]      July 24, 2018      Mary ANDRÉS Pemberton  [address]  [facility] [address]      EMERGENCY CARE PLAN  Presenting Problem Treatment Plan   Questions or concerns during clinic hours            24 hour Nurse line available - Call main clinic line and follow prompts I will call the clinic directly: [phone]  ONCOLOGY DEPARTMENT - [phone]        24 Hour Nurse Line [phone]- Follow prompts and press option for nurse advisor    [facility]  [address] [place] [address]   Patient needs to schedule an appointment  I will call the scheduling team during business hours at [phone]   Same day treatment   I will call the clinic first, then urgent care if needed   Clinic Care Coordinators [facility]  ONCOLOGY RN CARE COORDINATOR  [phone] - Sarahi Kunz      [phone] - [place]      RN Clinic Care Coordinator  [phone]     Crisis Services:  Behavioral or Mental Health [facility]al [facility]  [phone]   Emergency treatment--Immediately CALL 271

## 2018-07-24 NOTE — PROGRESS NOTES
Clinic Care Coordination Contact  Care Team Conversations    OCC completed a chart review prior to the pt's visit and also gathered information from the pt during the face to face visit today.    Pre-visit Medical Oncology patients    REASON FOR APPOINTMENT  Type of cancer - Skull Lesion - R Parietal Region; Metastatic Malignant Melanoma    Date of symptom onset:  2001  Who did you first talk to about your symptoms - PCP - started in the shoulder    Referring MD:  Dr Sam Tanner - [facility]  PCP:  Dr Tanner  Have you seen any other provider for consultation or treatment of your cancer:  Dr RYLAND Stearns - 2005; Dr Susy ALEGRE of  Rad Therapy; Dr Sheila ALEGRE of  - [facility] On[facility]      TREATMENT TO-DATE FOR THIS CANCER  Have you had a biopsy?None recently - states has been in remission/cured and was no need to follow up     Have you had surgery?  None     Have you had chemotherapy?  None    Oncologist:  Dr Smith  Are you on hormonal therapy?  None    PRIOR HISTORY OF CANCER  Previous cancer?:  Malignant Melanoma - 2001      Prior radiation?:  Dr Westley ALEGRE of [facility] Stearns - [facility]      Prior chemotherapy?: Yes     Oncologist:   Dr Sheila ALEGRE of   Prior hormonal therapy?  None    RECENT IMAGING STUDIES  Where and when?    U/S-Mammogram:  5/2018 - [facility]  PET/CT:  Head CT 5/16/18 - [facility]    LABS PERTINENT TO DIAGNOSIS  Have you had labs drawn:  ELP - 5/25/18 - G    CENTRAL LINE/PORT   None    HEALTH SCREENING  Last mammogram. 5/31/2018  Last colonoscopy.  3/3/2008  T-Dap  - 12/20/2004  Pneumonia - None  Flu Shot - 9/14/17  Shingles - None    FAMILY CANCER HISTORY  Father - Lung  Daughter - Melanoma      Sarahi Escobedo RN  [facility] Oncology Care Coordinator

## 2018-07-24 NOTE — PATIENT INSTRUCTIONS
We would like to see you back in 1 month. Please come 30minute(s) prior for lab work.  You have been scheduled for CT Scans of your neck, chest, abdomen, and pelvis. You have been scheduled for a Brain MRI and a whole body bone scan. You will have labs done today and at your next appt.     When you are in need of a refill of your medications, please call your pharmacy and they will send us the request. If you have any questions please call [phone]

## 2018-07-25 ASSESSMENT — PATIENT HEALTH QUESTIONNAIRE - PHQ9: SUM OF ALL RESPONSES TO PHQ QUESTIONS 1-9: 0

## 2018-07-25 NOTE — PROGRESS NOTES
Visit Date:   07/24/2018      REASON FOR CONSULTATION:  History of metastatic malignant melanoma with skeletal lesion noted in the left frontal skull.      REQUESTING PHYSICIAN:  Dr. Sam Tanner      HISTORY OF PRESENT ILLNESS:  Ms. Pemberton is a 63-year-old white female with a previous history of metastatic malignant melanoma, recurrent CVA whom we were asked to evaluate concerning lesion noted on CT of the head.  History as follows:  She had presented in 08/2000 with a shoulder and mid arm subcutaneous nodule biopsy and excision showed that this was malignant melanoma.  [place] lymph node was negative.  In 2001, the patient had second lesion found in the medial portion of the mid arm just distal to the axilla.  In 2004, a left breast lesion was noted.  This was resected and excised by wide local excision.  PET scan was negative for metastases in 10/2004.  The left shoulder lesion was resected by Dr. Francisco Lee at the [facility] and then was referred to Dr. Sosa at the [facility] Medical Oncology department for vaccine trial LMR to the patient's  tumor cells.        In the year 2005, the patient was found to have metastatic disease to the right atrium of the heart.  This was also resected.  Subsequently, in 10/2005, the patient developed metastatic disease to lymph nodes subcutaneous tissues.  An MRI revealed multiple brain lesions at that time.  The patient underwent whole brain radiation therapy under direction of Dr. Beka Stearns in [place] and then went on to see Dr. Sosa at the [facility] on 11/2005 and underwent high-dose interleukin-2 given treatment.  Subsequently, in 04/2009, she was found to have a 1.2 cm enhancing lesion in the outer table of left frontal skull.  She was referred to Dr. Rubio Jung of Neurosurgery and subsequently the patient underwent excisional biopsy of the lesion in the left frontal skull on 4/27/2009.  The patient underwent removal of  bone including the margin around the lesion, replacement with titanium.  Biopsy was negative for malignancy.  The patient has been without evidence of melanoma since.        She last saw Dr. Sosa on 07/12/2012 and he released her as she had been at least 5 years out and he felt the patient had complete remission from her melanoma.  In the interim, the patient has had memory loss which has been deemed secondary to radiation effect.  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  At that time, she had undergone a CTA of the head and neck on 03/16/2017.  In retrospect, there were areas of bony thickening and lucencies noted at that time.  Apparently most recently the patient complained of a lump on the top of her skull and this prompted a CT of the head ordered by Dr. Tanner which was not compared to previous CT of the head and this was read as a diffusely thickened skull with multiple small lucencies.  There were also postoperative changes in the left frontal region.  The lesions were felt to be either related to hyperparathyroidism or possible multiple myeloma.        PTH level was drawn and was normal.  There was no evidence of hyperparathyroidism, normal calcium.  Also, serum protein electrophoresis was obtained and urine protein electrophoresis was obtained and was negative for monoclonal spike.  The scans have now been reviewed by Dr. Boswell who feels there has been no change since the previous MRI of the brain that was done back in 03/2017.  Nonetheless, the patient is concerned about her melanoma status and would like to be reevaluated in terms of her melanoma.  Interleukin-2 has been shown to be curative in a few patients with metastatic melanoma.  Nonetheless, she would like to be reevaluated.  Other than memory loss, she denies any shortness of breath, chest pain, abdominal pain.  She does get constipation.   She has also had a recent dental infection.  She denies any new skin lesions, although she would like to be followed by dermatologist.  No fevers, night sweats, weight loss.  In fact, she has had weight gain.      PAST MEDICAL HISTORY:  Significant for metastatic malignant melanoma as above, hyperlipidemia, major depressive disorder, gastroesophageal reflux disease, history of CVA, TIA, superficial siderosis of central nervous system, overactive bladder, cognitive deficit, short-term memory loss, history of AFib, prediabetes, obesity, seizure disorder.      ALLERGIES:  CODEINE, CODEINE PHOSPHATE, QUINOLONES, TAMIFLU.      CURRENT MEDICATIONS:  Include:   1.  Ditropan 5 mg b.i.d.   2.  Ambien 10 mg at bedtime.   3.  Zocor 40 mg daily.   4.  Celexa 20 mg daily.   5.  Aspirin 162 mg daily.   6.  Vitamin B12 50 mcg daily.   7.  Gabapentin 600 mg t.i.d.     8.  Omega-3 fatty acids 1 capsule by mouth daily.   9.  Protonix 40 mg daily.      SOCIAL HISTORY:  She is a 1 pack per day smoker for at least 35 years, quit 16 years ago.  Alcohol is negative.  She is retired.  She was employed previously by [employer].      FAMILY HISTORY:  Father significant with lung cancer, aunt with unknown cancer.  Daughter with melanoma.      REVIEW OF SYSTEMS:   CENTRAL NERVOUS SYSTEM:  Negative for headaches.  She does have short-term memory loss.   RESPIRATORY:  Negative for cough, shortness of breath, hemoptysis.   CARDIAC:  Negative for chest pain, palpitations.   GASTROINTESTINAL:  Significant for constipation.  No history of bright red blood per rectum.   MUSCULOSKELETAL:  Unremarkable.   GENITOURINARY:  Negative for urgency, hematuria, nocturia.   CONSTITUTIONAL:  Negative for fevers, night sweats, weight loss.     DERMATOLOGIC:  Negative for new skin lesions.     HEME:  Negative for easy bruisability, epistaxis.      PHYSICAL EXAMINATION:   GENERAL:  She is a well-developed, well-nourished white female.   VITAL SIGNS:  Blood  pressure 102/68, pulse 68, temperature 98.3.   HEENT:  Significant for left parietal skull excision with scar noted on exam.  Pupils are equally reactive to light and accommodation.  Oropharynx nonerythematous.   NECK:  Supple.   LUNGS:  Clear to auscultation and percussion.   HEART:  Regular rhythm, S1, S2 normal.   ABDOMEN:  Soft, normoactive bowel sounds.  No mass, nontender.   LYMPHATICS:  No, cervical, supraclavicular, axillary or inguinal nodes.   EXTREMITIES:  No edema.   NEUROLOGIC:  Nonfocal.      LABORATORY DATA:  Reveal CBC:  White count 7.0, H&H 13.0 and 38.2, platelet count 270, BUN 12, creatinine 0.8.  LDH is 169.      IMPRESSION:  History of metastatic malignant melanoma with brain metastases, atrial metastases, lymph node metastases, as well as subcutaneous metastases treated with whole brain radiation therapy interleukin-2 back in 11/2005.  The patient has been in complete remission since then.  She did undergo a left parietal skull lesion excisional biopsy which was negative for malignancy.  Recent CT head indicates multiple lucencies and thickening as well as postoperative changes from left parietal skull biopsy.  These have not changed since previous CT done in 03/2017.  Nonetheless, we would like to rule out metastatic disease by proceeding with staging workup including bone scan, MRI of the brain, CT neck, chest, abdomen and pelvis.  We will await the results of these tests and proceed with further workup as necessary.  The patient will also be scheduled with a dermatologist for skin evaluation        Seventy minutes was spent with the patient, greater than half the time spent discussing the above and also reviewing records and discussing scans and ordering scans including MRI of the brain,  bone scan, CT neck, chest, abdomen and pelvis.         GLENN PORTILLO MD             D: 07/24/2018   T: 07/24/2018   MT: JOSE LUIS      Name:     BENJAMIN MACDONALD   MRN:      [id number]        Account:       [id number]   :      1955           Visit Date:   2018      Document: [id number]       cc: Sam Tanner MD

## 2018-08-15 ENCOUNTER — HOSPITAL ENCOUNTER (OUTPATIENT)
Dept: CT IMAGING | Facility: HOSPITAL | Age: 63
End: 2018-08-15
Attending: INTERNAL MEDICINE
Payer: MEDICARE

## 2018-08-15 ENCOUNTER — HOSPITAL ENCOUNTER (OUTPATIENT)
Dept: NUCLEAR MEDICINE | Facility: HOSPITAL | Age: 63
End: 2018-08-15
Attending: INTERNAL MEDICINE
Payer: MEDICARE

## 2018-08-15 ENCOUNTER — HOSPITAL ENCOUNTER (OUTPATIENT)
Dept: MRI IMAGING | Facility: HOSPITAL | Age: 63
End: 2018-08-15
Attending: INTERNAL MEDICINE
Payer: MEDICARE

## 2018-08-15 ENCOUNTER — HOSPITAL ENCOUNTER (OUTPATIENT)
Dept: CT IMAGING | Facility: HOSPITAL | Age: 63
Discharge: HOME | End: 2018-08-15
Attending: INTERNAL MEDICINE | Admitting: INTERNAL MEDICINE
Payer: MEDICARE

## 2018-08-15 DIAGNOSIS — M89.9 SKULL LESION: ICD-10-CM

## 2018-08-15 DIAGNOSIS — C43.9 METASTATIC MALIGNANT MELANOMA (H): ICD-10-CM

## 2018-08-15 DIAGNOSIS — F40.240 CLAUSTROPHOBIA: Primary | ICD-10-CM

## 2018-08-15 PROCEDURE — 71260 CT THORAX DX C+: CPT | Mod: TC

## 2018-08-15 PROCEDURE — A9585 GADOBUTROL INJECTION: HCPCS | Performed by: RADIOLOGY

## 2018-08-15 PROCEDURE — 70491 CT SOFT TISSUE NECK W/DYE: CPT | Mod: TC

## 2018-08-15 PROCEDURE — 78306 BONE IMAGING WHOLE BODY: CPT | Mod: TC

## 2018-08-15 PROCEDURE — 70553 MRI BRAIN STEM W/O & W/DYE: CPT | Mod: TC

## 2018-08-15 PROCEDURE — 25000128 H RX IP 250 OP 636: Performed by: RADIOLOGY

## 2018-08-15 PROCEDURE — A9503 TC99M MEDRONATE: HCPCS | Performed by: RADIOLOGY

## 2018-08-15 PROCEDURE — 34300033 ZZH RX 343: Performed by: RADIOLOGY

## 2018-08-15 RX ORDER — GADOBUTROL 604.72 MG/ML
7.5 INJECTION INTRAVENOUS ONCE
Status: COMPLETED | OUTPATIENT
Start: 2018-08-15 | End: 2018-08-15

## 2018-08-15 RX ORDER — IOPAMIDOL 612 MG/ML
100 INJECTION, SOLUTION INTRAVASCULAR ONCE
Status: COMPLETED | OUTPATIENT
Start: 2018-08-15 | End: 2018-08-15

## 2018-08-15 RX ORDER — DIAZEPAM 2 MG
2 TABLET ORAL ONCE
Qty: 1 TABLET | Refills: 0 | Status: SHIPPED | OUTPATIENT
Start: 2018-08-15 | End: 2018-08-15

## 2018-08-15 RX ORDER — IOPAMIDOL 612 MG/ML
50 INJECTION, SOLUTION INTRAVASCULAR ONCE
Status: COMPLETED | OUTPATIENT
Start: 2018-08-15 | End: 2018-08-15

## 2018-08-15 RX ORDER — TC 99M MEDRONATE 20 MG/10ML
25 INJECTION, POWDER, LYOPHILIZED, FOR SOLUTION INTRAVENOUS ONCE
Status: COMPLETED | OUTPATIENT
Start: 2018-08-15 | End: 2018-08-15

## 2018-08-15 RX ADMIN — IOPAMIDOL 100 ML: 612 INJECTION, SOLUTION INTRAVENOUS at 14:06

## 2018-08-15 RX ADMIN — GADOBUTROL 7.5 ML: 604.72 INJECTION INTRAVENOUS at 15:41

## 2018-08-15 RX ADMIN — IOPAMIDOL 50 ML: 612 INJECTION, SOLUTION INTRAVENOUS at 14:06

## 2018-08-15 RX ADMIN — DIATRIZOATE MEGLUMINE AND DIATRIZOATE SODIUM 30 ML: 660; 100 SOLUTION ORAL; RECTAL at 14:05

## 2018-08-15 RX ADMIN — TC 99M MEDRONATE 25 MCI.: 20 INJECTION, POWDER, LYOPHILIZED, FOR SOLUTION INTRAVENOUS at 09:15

## 2018-08-15 NOTE — PROGRESS NOTES
Rec'd call from nuclear medicine stating that pt is claustrophobic and is wondering if she can have medication for her scans today. Spoke with Jodi elam NP who agreed to write Rx for 2mg Valium. Will fax to [facility] in [place].     Call placed back to Nuclear Med-spoke to Wilda and informed her of the above. Pt reports her  will be driving her to the appointment at noon which is when her scan starts.

## 2018-08-20 ENCOUNTER — ONCOLOGY VISIT (OUTPATIENT)
Dept: ONCOLOGY | Facility: OTHER | Age: 63
End: 2018-08-20
Attending: INTERNAL MEDICINE
Payer: MEDICARE

## 2018-08-20 VITALS
OXYGEN SATURATION: 97 % | HEIGHT: 65 IN | RESPIRATION RATE: 20 BRPM | BODY MASS INDEX: 29.19 KG/M2 | TEMPERATURE: 98.2 F | HEART RATE: 65 BPM | WEIGHT: 175.2 LBS | SYSTOLIC BLOOD PRESSURE: 104 MMHG | DIASTOLIC BLOOD PRESSURE: 68 MMHG

## 2018-08-20 DIAGNOSIS — C43.9 METASTATIC MALIGNANT MELANOMA (H): Primary | ICD-10-CM

## 2018-08-20 DIAGNOSIS — G47.00 PERSISTENT INSOMNIA: ICD-10-CM

## 2018-08-20 PROCEDURE — G0463 HOSPITAL OUTPT CLINIC VISIT: HCPCS

## 2018-08-20 PROCEDURE — 99215 OFFICE O/P EST HI 40 MIN: CPT | Performed by: INTERNAL MEDICINE

## 2018-08-20 RX ORDER — ZOLPIDEM TARTRATE 10 MG/1
TABLET ORAL
Qty: 30 TABLET | Refills: 0 | Status: CANCELLED | OUTPATIENT
Start: 2018-08-20

## 2018-08-20 ASSESSMENT — PAIN SCALES - GENERAL: PAINLEVEL: NO PAIN (0)

## 2018-08-20 NOTE — PATIENT INSTRUCTIONS
We would like to see you back in 6 months. Please come 30minute(s) prior for lab work.      Please follow up with Dr. Tanner for the refill of your Ambien.     When you are in need of a refill of your medications, please call your pharmacy and they will send us the request. If you have any questions please call [phone]

## 2018-08-20 NOTE — PROGRESS NOTES
Visit Date:   08/20/2018      HISTORY OF PRESENT ILLNESS:  Ms. Pemberton returns for followup of history of metastatic malignant melanoma skeletal lesion noted in the left frontal skull.  We had seen the patient in consultation at the request of Dr. Tanner.  At that time, she was a 63-year-old white female with previous history of metastatic malignant melanoma, recurrent CVA, and we were asked to evaluate concerning the lesion noted on CT head.  Her history was that she had presented in 08/2000 with a shoulder and mid arm subcutaneous nodule.  Biopsy and excision showed that she had malignant melanoma.  [place] lymph node was negative.  In 2001, the patient had a second lesion found in the medial portion of the mid arm just distal to the axilla.  In 2004, left breast lesion was noted.  This was resected and excised by wide local excision.  PET scan was negative for metastasis.  In 10/2004, the left shoulder lesion was resected by Dr. Francisco Lee at the [facility] and then was referred to Dr. Sosa at the [facility] Medical Oncology Department for vaccine trial LMR to the patient's tumor cells.  In the year 2005, the patient was found to have metastatic disease to the right atrium of the heart.  This was also resected.  Subsequently, in 10/2005, the patient developed metastatic disease to lymph nodes and subcutaneous tissues.  An MRI revealed multiple brain lesions.  At that time, the patient underwent whole brain radiation therapy under the direction of Dr. Beka Stearns and then on to see Dr. Sosa at the [facility] on 11/2005 and underwent high-dose interleukin-2 treatment.  Subsequently, in 04/2009, she was found to have a 1.2 cm enhancing lesion in the outer table of the left frontal skull.  She was referred to Dr. Rubio Jung of Neurosurgery and, subsequently, the patient underwent excisional biopsy of lesion of the left frontal skull on 04/27/2009.  The patient underwent  removal of bone including the margin around the lesion and replaced with titanium.  Biopsy was negative for malignancy.  The patient has been without evidence of melanoma since then.  She last saw Dr. Sosa on 07/12/2012, and he released her as she had been at least 5 years out, and he felt the patient had a complete remission from her melanoma.  In the interim, the patient had memory loss and had been deemed secondary to radiation effect.  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  At that time, she had undergone a CTA of the head and neck on 03/16/2017.  In retrospect, there were areas of bony thickening lucencies noted at that time.  Apparently, most recently, the patient had complained of a lump at her skull, and this prompted a CT head ordered by Dr. Tanner, which was not compared to previous CT head, and this was read as diffusely thickened skull with multiple small lucencies.  There was also postoperative changes in the frontal region.  The lesions were felt to be either related to hyperparathyroidism or possible multiple myeloma.  PTH level was drawn and was also normal.  There was no evidence of hyperparathyroidism, normal calcium.  Also, serum protein electrophoresis was obtained and urine protein electrophoresis was obtained and was negative for monoclonal spike.  The scans were reviewed by Dr. Boswell who felt there had been no change in the previous MRI of the brain that was done back in 03/2017.  Nonetheless, the patient was concerned about her melanoma status and would like to be evaluated in terms of her melanoma.  Interleukin-2 has been shown to be curative in a few patients with metastatic melanoma.  Nonetheless, she wanted to be reevaluated.  Other than memory loss, she denied any shortness of breath, chest pain, abdominal pain.  She did get constipation.  She had not seen a dermatologist  recently.  When we saw her, we elected to restage her with MRI of the brain, which was done on 08/15/2018.  The findings were that there was no concerning interval change with no evidence of recurrent or new metastatic disease.  Again seen were numerous punctate signal voids without concerning interval change from 2017.  CT chest, abdomen and pelvis revealed no evidence of metastatic disease.  Also, a bone scan was essentially negative for metastatic disease.  The patient otherwise is doing well.  She says she has problems with insomnia and requires Ambien for sleep, but otherwise she has had some memory loss, but denies any shortness of breath, chest pain, abdominal pain, fevers, night sweats, or weight loss.  She would like to establish with a dermatologist.      PHYSICAL EXAMINATION:   GENERAL:  She is a middle-aged white female in no acute distress.   VITAL SIGNS:  Blood pressure 104/68, pulse 65, respirations 20, temperature 98.2.   HEENT:  Atraumatic, normocephalic.  Oropharynx nonerythematous.   NECK:  Supple.   LUNGS:  Clear to auscultation and percussion.   HEART:  Regular rhythm, S1, S2 normal.   ABDOMEN:  Soft, normoactive bowel sounds.  No mass, nontender.   LYMPHATICS:  No cervical, supraclavicular, axillary, or inguinal nodes.   EXTREMITIES:  No edema.   NEUROLOGIC:  Nonfocal.      LABORATORY DATA:  Reveal CBC is within normal limits.  BUN 12, creatinine 0.8.  LDH is normal.      IMPRESSION:  History of metastatic malignant melanoma with brain metastases, atrial metastases, lymph node metastases, as well as subcutaneous metastases treated with whole brain radiation therapy interleukin-2 back in 11/2005.  The patient has been in complete remission since then.  She did undergo a left parietal skull lesion excisional biopsy which was negative for malignancy.  Recent CT head indicating multiple lucencies and thickening as well as postop changes from the left parietal skull biopsy.  These have not changed  since previous MRI done in 2017.  Recent metastatic workup was also negative including repeat MRI of the brain, CT neck, chest, abdomen and pelvis, as well as bone scan.  The plan is to continue surveillance.  She will establish care with Dermatology.  Will refer the patient to Dr. Hugo Weaver of Dermatology for skin evaluation.  Otherwise, will see the patient in 6 months.  Obtain CBC, CMP, LDH.      Forty minutes was spent with the patient, greater than half that time spent discussing the above and also discussing scan results and also ordering followup labs and Dermatology consult.         GLENN PORTILLO MD             D: 2018   T: 2018   MT: DT      Name:     BENJAMIN MACDONALD   MRN:      [id number]        Account:      [id number]   :      1955           Visit Date:   2018      Document: [id number]       cc: KYLER Tanner MD

## 2018-08-20 NOTE — MR AVS SNAPSHOT
After Visit Summary   8/20/2018    Mary Pemberton    MRN: [id number]           Patient Information     Date Of Birth          1955        Visit Information        Provider Department      8/20/2018 10:30 AM Rodriguez Smith MD [facility]        Today's Diagnoses     Malignant melanoma, metastatic (H)    -  1    Persistent insomnia          Care Instructions    We would like to see you back in 6 months. Please come 30minute(s) prior for lab work.      Please follow up with Dr. Tanner for the refill of your Ambien.     When you are in need of a refill of your medications, please call your pharmacy and they will send us the request. If you have any questions please call [phone]            Follow-ups after your visit        Additional Services     DERMATOLOGY REFERRAL       Your provider has referred you to: [facility] [place] [phone]   [email or web address] Dr. Weaver. Hx or melanoma.     Please be aware that coverage of these services is subject to the terms and limitations of your health insurance plan.  Call member services at your health plan with any benefit or coverage questions.      Please bring the following with you to your appointment:    (1) Any X-Rays, CTs or MRIs which have been performed.  Contact the facility where they were done to arrange for  prior to your scheduled appointment.    (2) List of current medications  (3) This referral request   (4) Any documents/labs given to you for this referral                  Your next 10 appointments already scheduled     Oct 05, 2018 10:40 AM CDT   (Arrive by 10:25 AM)   SHORT with Sam Tanner MD   [facility]    [address] [place] [address]   [phone]            Feb 19, 2019 10:30 AM CST   LAB with  [facility]   [facility]    [address] [place] [address]   [phone]          "   Feb 19, 2019 11:00 AM CST   (Arrive by 10:45 AM)   Return Visit with Soni Jordan NP   [facility] [place] ([facility] )    [address] [place] [address]   [phone]              Who to contact     If you have questions or need follow up information about today's clinic visit or your schedule please contact [facility] directly at [phone].  Normal or non-critical lab and imaging results will be communicated to you by MyChart, letter or phone within 4 business days after the clinic has received the results. If you do not hear from us within 7 days, please contact the clinic through MyChart or phone. If you have a critical or abnormal lab result, we will notify you by phone as soon as possible.  Submit refill requests through [employer] or call your pharmacy and they will forward the refill request to us. Please allow 3 business days for your refill to be completed.          Additional Information About Your Visit        Care EveryWhere ID     This is your Care EveryWhere ID. This could be used by other organizations to access your [facility] medical records  [id number]        Your Vitals Were     Pulse Temperature Respirations Height Pulse Oximetry BMI (Body Mass Index)    65 98.2  F (36.8  C) (Tympanic) 20 1.651 m (5' 5\") 97% 29.15 kg/m2       Blood Pressure from Last 3 Encounters:   08/20/18 104/68   07/24/18 102/68   07/13/18 102/70    Weight from Last 3 Encounters:   08/20/18 79.5 kg (175 lb 3.2 oz)   07/24/18 79.1 kg (174 lb 6.4 oz)   07/13/18 79.7 kg (175 lb 12.8 oz)              We Performed the Following     DERMATOLOGY REFERRAL          Today's Medication Changes          These changes are accurate as of 8/20/18 10:51 AM.  If you have any questions, ask your nurse or doctor.               These medicines have changed or have updated prescriptions.        Dose/Directions    citalopram 20 MG tablet   Commonly known as:  celeXA   This may have " changed:  Another medication with the same name was removed. Continue taking this medication, and follow the directions you see here.   Used for:  Depression, unspecified depression type   Changed by:  Rodriguez Smith MD        TAKE ONE TABLET BY MOUTH ONCE DAILY   Quantity:  90 tablet   Refills:  1                Primary Care Provider Office Phone # Fax #    Sam Tanner [phone]       [facility] [place] [address]        Equal Access to Services     [facility]: Hadii aad ku hadasho Soomaali, waaxda luqadaha, qaybta kaalmada adeegyada, waxay idiin hayaan adeeg kharakeenan la'jann . So [facility] [phone].    ATENCIÓN: Si magi bauman, tiene a mckinnon disposición servicios gratuitos de asistencia lingüística. [facility] [phone].    We comply with applicable federal civil rights laws and [place] laws. We do not discriminate on the basis of race, color, national origin, age, disability, sex, sexual orientation, or gender identity.            Thank you!     Thank you for choosing [facility]  for your care. Our goal is always to provide you with excellent care. Hearing back from our patients is one way we can continue to improve our services. Please take a few minutes to complete the written survey that you may receive in the mail after your visit with us. Thank you!             Your Updated Medication List - Protect others around you: Learn how to safely use, store and throw away your medicines at www.disposemymeds.org.          This list is accurate as of 8/20/18 10:51 AM.  Always use your most recent med list.                   Brand Name Dispense Instructions for use Diagnosis    ASPIRIN ADULT LOW STRENGTH 81 MG chewable tablet   Generic drug:  aspirin     36 tablet    Take 162 mg by mouth daily    CVA (cerebral infarction)       citalopram 20 MG tablet    celeXA    90 tablet    TAKE ONE TABLET BY MOUTH ONCE DAILY    Depression, unspecified depression type        cyanocolbalamin 100 MCG tablet    vitamin  B-12     Take 50 mcg by mouth daily.        GABAPENTIN PO      Take 600 mg by mouth 3 times daily        OMEGA-3 FISH OIL PO      Take 1 capsule by mouth daily        oxybutynin 5 MG tablet    DITROPAN    60 tablet    Take 1 tablet (5 mg) by mouth 2 times daily    Frequency of urination       PROTONIX PO      Take 40 mg by mouth        simvastatin 40 MG tablet    ZOCOR    90 tablet    TAKE 1 TABLET BY MOUTH AT BEDTIME    Hyperlipidemia LDL goal <160       VITAMIN C PO           zolpidem 10 MG tablet    AMBIEN    30 tablet    TAKE 1 TABLET BY MOUTH AT BEDTIME AS NEEDED FOR SLEEP    Persistent insomnia

## 2018-08-20 NOTE — NURSING NOTE
"Chief Complaint   Patient presents with     RECHECK     Follow up Malignant melanoma, metastatic        Initial /68  Pulse 65  Temp 98.2  F (36.8  C) (Tympanic)  Resp 20  Ht 1.651 m (5' 5\")  Wt 79.5 kg (175 lb 3.2 oz)  SpO2 97%  BMI 29.15 kg/m2 Estimated body mass index is 29.15 kg/(m^2) as calculated from the following:    Height as of this encounter: 1.651 m (5' 5\").    Weight as of this encounter: 79.5 kg (175 lb 3.2 oz).  Medication Reconciliation: complete   Immunizations reviewed, declines advanced directive information, pain = 0, PHQ9 = 0.    Mariah Ye LPN    "

## 2018-08-21 DIAGNOSIS — F03.90 SENILE DEMENTIA (H): ICD-10-CM

## 2018-08-21 ASSESSMENT — PATIENT HEALTH QUESTIONNAIRE - PHQ9: SUM OF ALL RESPONSES TO PHQ QUESTIONS 1-9: 0

## 2018-08-22 RX ORDER — DONEPEZIL HYDROCHLORIDE 10 MG/1
TABLET, FILM COATED ORAL
Qty: 90 TABLET | Refills: 0 | Status: SHIPPED | OUTPATIENT
Start: 2018-08-22 | End: 2018-11-12

## 2018-08-24 DIAGNOSIS — G47.00 PERSISTENT INSOMNIA: ICD-10-CM

## 2018-08-24 NOTE — TELEPHONE ENCOUNTER
ambien      Last Written Prescription Date:  7/17/18  Last Fill Quantity: 30,   # refills: 0  Last Office Visit: 7/13/18  Future Office visit:    Next 5 appointments (look out 90 days)     Oct 05, 2018 10:40 AM CDT   (Arrive by 10:25 AM)   SHORT with Sam Tanner MD   [facility] ([facility] )    [address] [place] [address]   [phone]

## 2018-08-27 RX ORDER — ZOLPIDEM TARTRATE 10 MG/1
TABLET ORAL
Qty: 30 TABLET | Refills: 0 | Status: SHIPPED | OUTPATIENT
Start: 2018-08-27 | End: 2018-10-08

## 2018-09-11 ENCOUNTER — OFFICE VISIT (OUTPATIENT)
Dept: DERMATOLOGY | Facility: OTHER | Age: 63
End: 2018-09-11
Attending: DERMATOLOGY
Payer: MEDICARE

## 2018-09-11 VITALS
BODY MASS INDEX: 29.16 KG/M2 | HEIGHT: 65 IN | DIASTOLIC BLOOD PRESSURE: 76 MMHG | HEART RATE: 64 BPM | SYSTOLIC BLOOD PRESSURE: 118 MMHG | WEIGHT: 175 LBS

## 2018-09-11 DIAGNOSIS — C43.9 METASTATIC MALIGNANT MELANOMA (H): ICD-10-CM

## 2018-09-11 PROCEDURE — G0463 HOSPITAL OUTPT CLINIC VISIT: HCPCS | Mod: 25

## 2018-09-11 PROCEDURE — G0463 HOSPITAL OUTPT CLINIC VISIT: HCPCS

## 2018-09-11 PROCEDURE — 99202 OFFICE O/P NEW SF 15 MIN: CPT | Performed by: DERMATOLOGY

## 2018-09-11 ASSESSMENT — PAIN SCALES - GENERAL: PAINLEVEL: NO PAIN (0)

## 2018-09-11 NOTE — CONSULTS
Consult Date:  2018      DERMATOLOGY CONSULTATION       SUBJECTIVE:  Mary has metastatic  malignant melanoma and comes in today for a skin check.  The primary melanoma was on her arm and was diagnosed and treated in  she states.     She reports that she does have some brain metastases and is undergoing evaluations for therapy.      OBJECTIVE:  Shows a healthy lady in no distress.  We checked her face, neck, chest, back, arms, legs, back, and  buttocks.  I  found no concerning lesions that would suggest atypical moles or melanoma on  examination.      MEDICATIONS AND ALLERGIES:  Reviewed.      PLAN:  Suggest return in 6 months or sooner is she or her physicians have any concerns about new lesions.         KYLER CHA MD             D: 2018   T: 2018   MT: CC      Name:     MARY MACDONALD   MRN:      [id number]        Account:       [id number]   :      1955           Consult Date:  2018      Document: [id number]       cc: Sam Tanner MD

## 2018-09-11 NOTE — NURSING NOTE
"Chief Complaint   Patient presents with     New Patient       Initial /76  Pulse 64  Ht 1.651 m (5' 5\")  Wt 79.4 kg (175 lb)  BMI 29.12 kg/m2 Estimated body mass index is 29.12 kg/(m^2) as calculated from the following:    Height as of this encounter: 1.651 m (5' 5\").    Weight as of this encounter: 79.4 kg (175 lb).  Medication Reconciliation: complete    Marilia Guo LPN    "

## 2018-09-11 NOTE — MR AVS SNAPSHOT
After Visit Summary   9/11/2018    Mary Pemberton    MRN: [id number]           Patient Information     Date Of Birth          1955        Visit Information        Provider Department      9/11/2018 10:45 AM KYLER Weaver MD [facility]        Today's Diagnoses     Metastatic malignant melanoma (H)           Follow-ups after your visit        Your next 10 appointments already scheduled     Oct 05, 2018 10:40 AM CDT   (Arrive by 10:25 AM)   SHORT with Sam Tanner MD   [facility]    [address]  [place] [address]   [phone]            Feb 19, 2019 10:30 AM CST   LAB with HC LAB   [facility] ([facility]    [address]  [facility] [address]   [phone]            Feb 19, 2019 11:00 AM CST   (Arrive by 10:45 AM)   Return Visit with Soni Jordan NP   [facility] ([facility] [place] )    [address] [facility] [address]   [phone]              Who to contact     If you have questions or need follow up information about today's clinic visit or your schedule please contact [facility] directly at [phone].  Normal or non-critical lab and imaging results will be communicated to you by MyChart, letter or phone within 4 business days after the clinic has received the results. If you do not hear from us within 7 days, please contact the clinic through MyChart or phone. If you have a critical or abnormal lab result, we will notify you by phone as soon as possible.  Submit refill requests through [employer] or call your pharmacy and they will forward the refill request to us. Please allow 3 business days for your refill to be completed.          Additional Information About Your Visit        Care EveryWhere ID     This is your Care EveryWhere ID. This could be used by other organizations to access your [facility]  "records  [id number]        Your Vitals Were     Pulse Height BMI (Body Mass Index)             64 1.651 m (5' 5\") 29.12 kg/m2          Blood Pressure from Last 3 Encounters:   09/11/18 118/76   08/20/18 104/68   07/24/18 102/68    Weight from Last 3 Encounters:   09/11/18 79.4 kg (175 lb)   08/20/18 79.5 kg (175 lb 3.2 oz)   07/24/18 79.1 kg (174 lb 6.4 oz)              Today, you had the following     No orders found for display       Primary Care Provider Office Phone # Fax #    Sam Tanner [phone] [address] [address]        Equal Access to Services     DELVIN VILLEGAS : Benny Kelly, waaxjolene luqadaha, qaybta kaalmada adecasieyajolene, kelly sutherland . So [facility] [phone].    ATENCIÓN: Si habla español, tiene a mckinnon disposición servicios gratuitos de asistencia lingüística. Llame al [phone].    We comply with applicable federal civil rights laws and [place] laws. We do not discriminate on the basis of race, color, national origin, age, disability, sex, sexual orientation, or gender identity.            Thank you!     Thank you for choosing [facility]  for your care. Our goal is always to provide you with excellent care. Hearing back from our patients is one way we can continue to improve our services. Please take a few minutes to complete the written survey that you may receive in the mail after your visit with us. Thank you!             Your Updated Medication List - Protect others around you: Learn how to safely use, store and throw away your medicines at www.disposemymeds.org.          This list is accurate as of 9/11/18 11:59 PM.  Always use your most recent med list.                   Brand Name Dispense Instructions for use Diagnosis    ASPIRIN ADULT LOW STRENGTH 81 MG chewable tablet   Generic drug:  aspirin     36 tablet    Take 162 mg by mouth daily    CVA (cerebral infarction)       citalopram 20 MG " tablet    celeXA    90 tablet    TAKE ONE TABLET BY MOUTH ONCE DAILY    Depression, unspecified depression type       cyanocolbalamin 100 MCG tablet    vitamin  B-12     Take 50 mcg by mouth daily.        donepezil 10 MG tablet    ARICEPT    90 tablet    TAKE ONE TABLET BY MOUTH AT BEDTIME    Senile dementia       GABAPENTIN PO      Take 600 mg by mouth 3 times daily        OMEGA-3 FISH OIL PO      Take 1 capsule by mouth daily        oxybutynin 5 MG tablet    DITROPAN    60 tablet    Take 1 tablet (5 mg) by mouth 2 times daily    Frequency of urination       PROTONIX PO      Take 40 mg by mouth        simvastatin 40 MG tablet    ZOCOR    90 tablet    TAKE 1 TABLET BY MOUTH AT BEDTIME    Hyperlipidemia LDL goal <160       VITAMIN C PO           zolpidem 10 MG tablet    AMBIEN    30 tablet    TAKE 1 TABLET BY MOUTH AT BEDTIME AS NEEDED FOR SLEEP    Persistent insomnia

## 2018-09-11 NOTE — LETTER
9/11/2018       RE: Mary BOWEN Nilay  [address]   [place] [address]     Dear Colleague,    Thank you for referring your patient, Mary Pemberton, to the [facility] [place] [facility]. Please see a copy of my visit note below.    Dictated and edited    Again, thank you for allowing me to participate in the care of your patient.      Sincerely,    KYLER Weaver MD

## 2018-10-05 ENCOUNTER — OFFICE VISIT (OUTPATIENT)
Dept: FAMILY MEDICINE | Facility: OTHER | Age: 63
End: 2018-10-05
Attending: FAMILY MEDICINE
Payer: MEDICARE

## 2018-10-05 ENCOUNTER — APPOINTMENT (OUTPATIENT)
Dept: LAB | Facility: OTHER | Age: 63
End: 2018-10-05
Attending: FAMILY MEDICINE
Payer: MEDICARE

## 2018-10-05 VITALS
RESPIRATION RATE: 20 BRPM | DIASTOLIC BLOOD PRESSURE: 60 MMHG | SYSTOLIC BLOOD PRESSURE: 100 MMHG | BODY MASS INDEX: 28.29 KG/M2 | HEART RATE: 66 BPM | TEMPERATURE: 99.1 F | WEIGHT: 170 LBS | OXYGEN SATURATION: 96 %

## 2018-10-05 DIAGNOSIS — J01.01 ACUTE RECURRENT MAXILLARY SINUSITIS: ICD-10-CM

## 2018-10-05 DIAGNOSIS — G40.001 PARTIAL IDIOPATHIC EPILEPSY WITH SEIZURES OF LOCALIZED ONSET, NOT INTRACTABLE, WITH STATUS EPILEPTICUS (H): ICD-10-CM

## 2018-10-05 DIAGNOSIS — C43.9 METASTATIC MALIGNANT MELANOMA (H): ICD-10-CM

## 2018-10-05 DIAGNOSIS — R30.0 DYSURIA: Primary | ICD-10-CM

## 2018-10-05 LAB
ALBUMIN UR-MCNC: 10 MG/DL
ANION GAP SERPL CALCULATED.3IONS-SCNC: 10 MMOL/L (ref 3–14)
APPEARANCE UR: CLEAR
BACTERIA #/AREA URNS HPF: ABNORMAL /HPF
BASOPHILS # BLD AUTO: 0.1 10E9/L (ref 0–0.2)
BASOPHILS NFR BLD AUTO: 0.9 %
BILIRUB UR QL STRIP: NEGATIVE
BUN SERPL-MCNC: 13 MG/DL (ref 7–30)
CALCIUM SERPL-MCNC: 9.2 MG/DL (ref 8.5–10.1)
CHLORIDE SERPL-SCNC: 105 MMOL/L (ref 94–109)
CO2 SERPL-SCNC: 21 MMOL/L (ref 20–32)
COLOR UR AUTO: YELLOW
CREAT SERPL-MCNC: 0.77 MG/DL (ref 0.52–1.04)
DIFFERENTIAL METHOD BLD: NORMAL
EOSINOPHIL # BLD AUTO: 0.1 10E9/L (ref 0–0.7)
EOSINOPHIL NFR BLD AUTO: 1.9 %
ERYTHROCYTE [DISTWIDTH] IN BLOOD BY AUTOMATED COUNT: 12.7 % (ref 10–15)
GFR SERPL CREATININE-BSD FRML MDRD: 76 ML/MIN/1.7M2
GLUCOSE SERPL-MCNC: 118 MG/DL (ref 70–99)
GLUCOSE UR STRIP-MCNC: NEGATIVE MG/DL
HCT VFR BLD AUTO: 41.6 % (ref 35–47)
HGB BLD-MCNC: 14.3 G/DL (ref 11.7–15.7)
HGB UR QL STRIP: NEGATIVE
IMM GRANULOCYTES # BLD: 0 10E9/L (ref 0–0.4)
IMM GRANULOCYTES NFR BLD: 0.3 %
KETONES UR STRIP-MCNC: NEGATIVE MG/DL
LEUKOCYTE ESTERASE UR QL STRIP: ABNORMAL
LYMPHOCYTES # BLD AUTO: 2.2 10E9/L (ref 0.8–5.3)
LYMPHOCYTES NFR BLD AUTO: 31.8 %
MCH RBC QN AUTO: 30.9 PG (ref 26.5–33)
MCHC RBC AUTO-ENTMCNC: 34.4 G/DL (ref 31.5–36.5)
MCV RBC AUTO: 90 FL (ref 78–100)
MONOCYTES # BLD AUTO: 0.6 10E9/L (ref 0–1.3)
MONOCYTES NFR BLD AUTO: 8.9 %
NEUTROPHILS # BLD AUTO: 3.8 10E9/L (ref 1.6–8.3)
NEUTROPHILS NFR BLD AUTO: 56.2 %
NITRATE UR QL: NEGATIVE
NRBC # BLD AUTO: 0 10*3/UL
NRBC BLD AUTO-RTO: 0 /100
PH UR STRIP: 6 PH (ref 4.7–8)
PLATELET # BLD AUTO: 289 10E9/L (ref 150–450)
POTASSIUM SERPL-SCNC: 4.3 MMOL/L (ref 3.4–5.3)
RBC # BLD AUTO: 4.63 10E12/L (ref 3.8–5.2)
RBC #/AREA URNS AUTO: 2 /HPF (ref 0–2)
SODIUM SERPL-SCNC: 136 MMOL/L (ref 133–144)
SOURCE: ABNORMAL
SP GR UR STRIP: 1.01 (ref 1–1.03)
SQUAMOUS #/AREA URNS AUTO: 1 /HPF (ref 0–1)
UROBILINOGEN UR STRIP-MCNC: NORMAL MG/DL (ref 0–2)
WBC # BLD AUTO: 6.8 10E9/L (ref 4–11)
WBC #/AREA URNS AUTO: 4 /HPF (ref 0–5)

## 2018-10-05 PROCEDURE — G0463 HOSPITAL OUTPT CLINIC VISIT: HCPCS

## 2018-10-05 PROCEDURE — 81001 URINALYSIS AUTO W/SCOPE: CPT | Mod: ZL | Performed by: FAMILY MEDICINE

## 2018-10-05 PROCEDURE — 80048 BASIC METABOLIC PNL TOTAL CA: CPT | Mod: ZL | Performed by: FAMILY MEDICINE

## 2018-10-05 PROCEDURE — 36415 COLL VENOUS BLD VENIPUNCTURE: CPT | Mod: ZL | Performed by: FAMILY MEDICINE

## 2018-10-05 PROCEDURE — 99214 OFFICE O/P EST MOD 30 MIN: CPT | Performed by: FAMILY MEDICINE

## 2018-10-05 PROCEDURE — 85025 COMPLETE CBC W/AUTO DIFF WBC: CPT | Mod: ZL | Performed by: FAMILY MEDICINE

## 2018-10-05 RX ORDER — CEFPROZIL 250 MG/1
250 TABLET, FILM COATED ORAL 2 TIMES DAILY
Qty: 20 TABLET | Refills: 0 | Status: SHIPPED | OUTPATIENT
Start: 2018-10-05 | End: 2019-02-05

## 2018-10-05 ASSESSMENT — ANXIETY QUESTIONNAIRES
3. WORRYING TOO MUCH ABOUT DIFFERENT THINGS: SEVERAL DAYS
7. FEELING AFRAID AS IF SOMETHING AWFUL MIGHT HAPPEN: NOT AT ALL
2. NOT BEING ABLE TO STOP OR CONTROL WORRYING: NOT AT ALL
6. BECOMING EASILY ANNOYED OR IRRITABLE: NOT AT ALL
5. BEING SO RESTLESS THAT IT IS HARD TO SIT STILL: NOT AT ALL
GAD7 TOTAL SCORE: 1
1. FEELING NERVOUS, ANXIOUS, OR ON EDGE: NOT AT ALL

## 2018-10-05 ASSESSMENT — PATIENT HEALTH QUESTIONNAIRE - PHQ9: 5. POOR APPETITE OR OVEREATING: NOT AT ALL

## 2018-10-05 ASSESSMENT — PAIN SCALES - GENERAL: PAINLEVEL: NO PAIN (0)

## 2018-10-05 NOTE — NURSING NOTE
"Chief Complaint   Patient presents with     seizure disorder     follow up       Initial /60 (BP Location: Right arm, Patient Position: Chair, Cuff Size: Adult Large)  Pulse 66  Temp 99.1  F (37.3  C) (Tympanic)  Resp 20  Wt 170 lb (77.1 kg)  SpO2 96%  BMI 28.29 kg/m2 Estimated body mass index is 28.29 kg/(m^2) as calculated from the following:    Height as of 9/11/18: 5' 5\" (1.651 m).    Weight as of this encounter: 170 lb (77.1 kg).  Medication Reconciliation: complete    Saumya Cohen LPN    "

## 2018-10-05 NOTE — MR AVS SNAPSHOT
After Visit Summary   10/5/2018    Mary Pemberton    MRN: [id number]           Patient Information     Date Of Birth          1955        Visit Information        Provider Department      10/5/2018 10:40 AM Sam Tanner MD [facility]        Today's Diagnoses     Dysuria    -  1    Acute recurrent maxillary sinusitis        Malignant melanoma, metastatic (H)        Partial idiopathic epilepsy with seizures of localized onset, not intractable, with status epilepticus (H)          Care Instructions    Continue same medications.            Follow-ups after your visit        Follow-up notes from your care team     Return in about 6 months (around 4/5/2019) for follow up visit seizure disorder.      Your next 10 appointments already scheduled     Feb 19, 2019 10:30 AM CST   LAB with HC LAB   [facility]    [address] [place] [address]   [phone]            Feb 19, 2019 11:00 AM CST   (Arrive by 10:45 AM)   [facility] Visit with Soni Jordan NP   [facility]    [address] [place] [address]   [phone]              Who to contact     If you have questions or need follow up information about today's clinic visit or your schedule please contact [facility] directly at [phone].  Normal or non-critical lab and imaging results will be communicated to you by MyChart, letter or phone within 4 business days after the clinic has received the results. If you do not hear from us within 7 days, please contact the clinic through MyChart or phone. If you have a critical or abnormal lab result, we will notify you by phone as soon as possible.  Submit refill requests through [employer] or call your pharmacy and they will forward the refill request to us. Please allow 3 business days for your refill to be completed.           Additional Information About Your Visit        Care EveryWhere ID     This is your Care EveryWhere ID. This could be used by other organizations to access your [place] medical records  [id number]        Your Vitals Were     Pulse Temperature Respirations Pulse Oximetry BMI (Body Mass Index)       66 99.1  F (37.3  C) (Tympanic) 20 96% 28.29 kg/m2        Blood Pressure from Last 3 Encounters:   10/05/18 100/60   09/11/18 118/76   08/20/18 104/68    Weight from Last 3 Encounters:   10/05/18 170 lb (77.1 kg)   09/11/18 175 lb (79.4 kg)   08/20/18 175 lb 3.2 oz (79.5 kg)              We Performed the Following     Basic metabolic panel     CBC with platelets differential     UA reflex to Microscopic and Culture          Today's Medication Changes          These changes are accurate as of 10/5/18 11:59 PM.  If you have any questions, ask your nurse or doctor.               Start taking these medicines.        Dose/Directions    cefPROZIL 250 MG tablet   Commonly known as:  CEFZIL   Used for:  Acute recurrent maxillary sinusitis   Started by:  Sam Tanner MD        Dose:  250 mg   Take 1 tablet (250 mg) by mouth 2 times daily   Quantity:  20 tablet   Refills:  0            Where to get your medicines      These medications were sent to [id number]  [place] - [address]  [address] [address]     Phone:  [phone]     cefPROZIL 250 MG tablet                Primary Care Provider Office Phone # Fax #    Sam Tanner [phone]       [address] [place] [address]  [place] [address]        Equal Access to Services     [facility] AH: Hadii emilee gregg hadasho Sokendall, waaxda luqadaha, qaybta kaalmada adeegyada, waxpierre idiin hayaan adeeg kharash la'aan . So [facility] [phone].    ATENCIÓN: Si habla español, tiene a mckinnon disposición servicios gratuitos de asistencia lingüística. Llame al [phone].    We comply with applicable federal civil rights laws and [place] laws. We do not  discriminate on the basis of race, color, national origin, age, disability, sex, sexual orientation, or gender identity.            Thank you!     Thank you for choosing [facility]  for your care. Our goal is always to provide you with excellent care. Hearing back from our patients is one way we can continue to improve our services. Please take a few minutes to complete the written survey that you may receive in the mail after your visit with us. Thank you!             Your Updated Medication List - Protect others around you: Learn how to safely use, store and throw away your medicines at www.disposemymeds.org.          This list is accurate as of 10/5/18 11:59 PM.  Always use your most recent med list.                   Brand Name Dispense Instructions for use Diagnosis    ASPIRIN ADULT LOW STRENGTH 81 MG chewable tablet   Generic drug:  aspirin     36 tablet    Take 162 mg by mouth daily    CVA (cerebral infarction)       cefPROZIL 250 MG tablet    CEFZIL    20 tablet    Take 1 tablet (250 mg) by mouth 2 times daily    Acute recurrent maxillary sinusitis       citalopram 20 MG tablet    celeXA    90 tablet    TAKE ONE TABLET BY MOUTH ONCE DAILY    Depression, unspecified depression type       cyanocolbalamin 100 MCG tablet    vitamin  B-12     Take 50 mcg by mouth daily.        donepezil 10 MG tablet    ARICEPT    90 tablet    TAKE ONE TABLET BY MOUTH AT BEDTIME    Senile dementia       GABAPENTIN PO      Take 600 mg by mouth 3 times daily        OMEGA-3 FISH OIL PO      Take 1 capsule by mouth daily        oxybutynin 5 MG tablet    DITROPAN    60 tablet    Take 1 tablet (5 mg) by mouth 2 times daily    Frequency of urination       PROTONIX PO      Take 40 mg by mouth        simvastatin 40 MG tablet    ZOCOR    90 tablet    TAKE 1 TABLET BY MOUTH AT BEDTIME    Hyperlipidemia LDL goal <160       VITAMIN C PO           zolpidem 10 MG tablet    AMBIEN    30 tablet    TAKE 1 TABLET BY MOUTH AT  BEDTIME AS NEEDED FOR SLEEP    Persistent insomnia

## 2018-10-05 NOTE — LETTER
My Depression Action Plan  Name: Mary Pemberton   Date of Birth 1955  Date: 10/3/2018    My doctor: Sam Tanner   My clinic: [facility] [place]  [address] [place] [address]  [phone]          GREEN    ZONE   Good Control    What it looks like:     Things are going generally well. You have normal up s and down s. You may even feel depressed from time to time, but bad moods usually last less than a day.   What you need to do:  1. Continue to care for yourself (see self care plan)  2. Check your depression survival kit and update it as needed  3. Follow your physician s recommendations including any medication.  4. Do not stop taking medication unless you consult with your physician first.           YELLOW         ZONE Getting Worse    What it looks like:     Depression is starting to interfere with your life.     It may be hard to get out of bed; you may be starting to isolate yourself from others.    Symptoms of depression are starting to last most all day and this has happened for several days.     You may have suicidal thoughts but they are not constant.   What you need to do:     1. Call your care team, your response to treatment will improve if you keep your care team informed of your progress. Yellow periods are signs an adjustment may need to be made.     2. Continue your self-care, even if you have to fake it!    3. Talk to someone in your support network    4. Open up your depression survival kit           RED    ZONE Medical Alert - Get Help    What it looks like:     Depression is seriously interfering with your life.     You may experience these or other symptoms: You can t get out of bed most days, can t work or engage in other necessary activities, you have trouble taking care of basic hygiene, or basic responsibilities, thoughts of suicide or death that will not go away, self-injurious behavior.     What you need to do:  1. Call your care team and request a  same-day appointment. If they are not available (weekends or after hours) call your local crisis line, emergency room or 911.            Depression Self Care Plan / Survival Kit    Self-Care for Depression  Here s the deal. Your body and mind are really not as separate as most people think.  What you do and think affects how you feel and how you feel influences what you do and think. This means if you do things that people who feel good do, it will help you feel better.  Sometimes this is all it takes.  There is also a place for medication and therapy depending on how severe your depression is, so be sure to consult with your medical provider and/ or Behavioral Health Consultant if your symptoms are worsening or not improving.     In order to better manage my stress, I will:    Exercise  Get some form of exercise, every day. This will help reduce pain and release endorphins, the  feel good  chemicals in your brain. This is almost as good as taking antidepressants!  This is not the same as joining a gym and then never going! (they count on that by the way ) It can be as simple as just going for a walk or doing some gardening, anything that will get you moving.      Hygiene   Maintain good hygiene (Get out of bed in the morning, Make your bed, Brush your teeth, Take a shower, and Get dressed like you were going to work, even if you are unemployed).  If your clothes don't fit try to get ones that do.    Diet  I will strive to eat foods that are good for me, drink plenty of water, and avoid excessive sugar, caffeine, alcohol, and other mood-altering substances.  Some foods that are helpful in depression are: complex carbohydrates, B vitamins, flaxseed, fish or fish oil, fresh fruits and vegetables.    Psychotherapy  I agree to participate in Individual Therapy (if recommended).    Medication  If prescribed medications, I agree to take them.  Missing doses can result in serious side effects.  I understand that drinking  alcohol, or other illicit drug use, may cause potential side effects.  I will not stop my medication abruptly without first discussing it with my provider.    Staying Connected With Others  I will stay in touch with my friends, family members, and my primary care provider/team.    Use your imagination  Be creative.  We all have a creative side; it doesn t matter if it s oil painting, sand castles, or mud pies! This will also kick up the endorphins.    Witness Beauty  (AKA stop and smell the roses) Take a look outside, even in mid-winter. Notice colors, textures. Watch the squirrels and birds.     Service to others  Be of service to others.  There is always someone else in need.  By helping others we can  get out of ourselves  and remember the really important things.  This also provides opportunities for practicing all the other parts of the program.    Humor  Laugh and be silly!  Adjust your TV habits for less news and crime-drama and more comedy.    Control your stress  Try breathing deep, massage therapy, biofeedback, and meditation. Find time to relax each day.     My support system    Clinic Contact:  Phone number:    Contact 1:  Phone number:    Contact 2:  Phone number:    [facility]/:  Phone number:    Therapist:  Phone number:    Local crisis center:    Phone number:    Other community support:  Phone number:

## 2018-10-06 ASSESSMENT — ANXIETY QUESTIONNAIRES: GAD7 TOTAL SCORE: 1

## 2018-10-06 ASSESSMENT — PATIENT HEALTH QUESTIONNAIRE - PHQ9: SUM OF ALL RESPONSES TO PHQ QUESTIONS 1-9: 4

## 2018-10-08 DIAGNOSIS — G47.00 PERSISTENT INSOMNIA: ICD-10-CM

## 2018-10-10 ENCOUNTER — TELEPHONE (OUTPATIENT)
Dept: FAMILY MEDICINE | Facility: OTHER | Age: 63
End: 2018-10-10

## 2018-10-10 NOTE — TELEPHONE ENCOUNTER
"Patient is requesting eye \"tonya\" that she uses for redness.  She states that her eyes \"shut down\" if she can't use it.  I do not see anything on her med list and she did not know the name. Please advise. Thank you   "

## 2018-10-11 RX ORDER — ZOLPIDEM TARTRATE 10 MG/1
TABLET ORAL
Qty: 30 TABLET | Refills: 0 | Status: SHIPPED | OUTPATIENT
Start: 2018-10-11 | End: 2018-10-15

## 2018-10-11 NOTE — TELEPHONE ENCOUNTER
Could you please call pharmacy.  Patient did not know name of medication.  She uses [facility].

## 2018-10-15 ENCOUNTER — TELEPHONE (OUTPATIENT)
Dept: FAMILY MEDICINE | Facility: OTHER | Age: 63
End: 2018-10-15

## 2018-10-15 ENCOUNTER — OFFICE VISIT (OUTPATIENT)
Dept: FAMILY MEDICINE | Facility: OTHER | Age: 63
End: 2018-10-15
Attending: FAMILY MEDICINE
Payer: MEDICARE

## 2018-10-15 VITALS
DIASTOLIC BLOOD PRESSURE: 56 MMHG | HEART RATE: 56 BPM | TEMPERATURE: 96.5 F | HEIGHT: 65 IN | SYSTOLIC BLOOD PRESSURE: 102 MMHG | RESPIRATION RATE: 20 BRPM | OXYGEN SATURATION: 98 % | BODY MASS INDEX: 28.32 KG/M2 | WEIGHT: 170 LBS

## 2018-10-15 DIAGNOSIS — G47.00 PERSISTENT INSOMNIA: ICD-10-CM

## 2018-10-15 DIAGNOSIS — H10.12 ALLERGIC CONJUNCTIVITIS, LEFT: Primary | ICD-10-CM

## 2018-10-15 PROCEDURE — G0463 HOSPITAL OUTPT CLINIC VISIT: HCPCS

## 2018-10-15 PROCEDURE — 99213 OFFICE O/P EST LOW 20 MIN: CPT | Performed by: FAMILY MEDICINE

## 2018-10-15 RX ORDER — METHYLPREDNISOLONE 4 MG
TABLET, DOSE PACK ORAL
Qty: 21 TABLET | Refills: 0 | Status: SHIPPED | OUTPATIENT
Start: 2018-10-15 | End: 2018-10-29

## 2018-10-15 RX ORDER — IBUPROFEN 600 MG/1
TABLET, FILM COATED ORAL
Refills: 0 | COMMUNITY
Start: 2018-10-11

## 2018-10-15 RX ORDER — HYDROCODONE BITARTRATE AND ACETAMINOPHEN 5; 325 MG/1; MG/1
TABLET ORAL
Refills: 0 | COMMUNITY
Start: 2018-10-11

## 2018-10-15 RX ORDER — ZOLPIDEM TARTRATE 10 MG/1
TABLET ORAL
Qty: 30 TABLET | Refills: 0 | Status: SHIPPED | OUTPATIENT
Start: 2018-10-15 | End: 2018-12-27

## 2018-10-15 ASSESSMENT — PAIN SCALES - GENERAL: PAINLEVEL: NO PAIN (1)

## 2018-10-15 NOTE — PROGRESS NOTES
SUBJECTIVE:   Mary Pemberton is a 63 year old female who presents to clinic today for the following health issues:      Eye(s) Problem      Duration: 4 days    Description:  Location: left  Pain: YES  Redness: YES  Discharge: YES    Accompanying signs and symptoms: left eye red, swollen and itchy    History (Trauma, foreign body exposure,): yes has had issues like this prior    Precipitating or alleviating factors (contact use): None    Therapies tried and outcome: None      RESPIRATORY SYMPTOMS      Duration: 1week    Description  nasal congestion and cough    Severity: mild    Accompanying signs and symptoms: None    History (predisposing factors):  none    Precipitating or alleviating factors: None    Therapies tried and outcome:  none      Problem list and histories reviewed & adjusted, as indicated.  Additional history: as documented    Patient Active Problem List   Diagnosis     Malignant melanoma, metastatic (H)     Dyslipidemia     Major depressive disorder, recurrent episode, mild (H)     GERD (gastroesophageal reflux disease)     Nonspecific abnormal electrocardiogram (ECG) (EKG)     Prediabetes     OAB (overactive bladder)     Cognitive deficit following cerebrovascular accident (CVA)     ACP (advance care planning)     TIA (transient ischemic attack)     Superficial siderosis of central nervous system     Obesity     Partial idiopathic epilepsy with seizures of localized onset, not intractable, with status epilepticus (H)     Seizure disorder (H)     Health Care Home     Past Surgical History:   Procedure Laterality Date     arm      LT     brain bx       COLONOSCOPY  [id number]     open heart         Social History   Substance Use Topics     Smoking status: Former Smoker     Packs/day: 1.00     Years: 20.00     Types: Cigarettes     Start date: 8/20/1969     Quit date: 6/6/2002     Smokeless tobacco: Never Used      Comment: year quit 2001; no passive exposure.     Alcohol use No     Family History    Problem Relation Age of Onset     Cancer Father      lung     Other Cancer Father      lung     Hypertension Mother      Coronary Artery Disease Mother      Cancer Maternal Grandfather      Other Cancer Maternal Grandfather      Coronary Artery Disease Paternal Uncle      Diabetes No family hx of      Hyperlipidemia No family hx of      Cerebrovascular Disease No family hx of      Breast Cancer No family hx of      Colon Cancer No family hx of      Prostate Cancer No family hx of      Thyroid Disease No family hx of      Genetic Disorder No family hx of      Anesthesia Reaction No family hx of      Substance Abuse No family hx of          Current Outpatient Prescriptions   Medication Sig Dispense Refill     Ascorbic Acid (VITAMIN C PO)        aspirin (ASPIRIN ADULT LOW STRENGTH) 81 MG chewable tablet Take 162 mg by mouth daily  36 tablet      cefPROZIL (CEFZIL) 250 MG tablet Take 1 tablet (250 mg) by mouth 2 times daily 20 tablet 0     citalopram (CELEXA) 20 MG tablet TAKE ONE TABLET BY MOUTH ONCE DAILY 90 tablet 1     cyanocolbalamin (VITAMIN  B-12) 100 MCG tablet Take 50 mcg by mouth daily.       donepezil (ARICEPT) 10 MG tablet TAKE ONE TABLET BY MOUTH AT BEDTIME 90 tablet 0     GABAPENTIN PO Take 600 mg by mouth 3 times daily       HYDROcodone-acetaminophen (NORCO) 5-325 MG per tablet   0     ibuprofen (ADVIL/MOTRIN) 600 MG tablet   0     Omega-3 Fatty Acids (OMEGA-3 FISH OIL PO) Take 1 capsule by mouth daily        oxybutynin (DITROPAN) 5 MG tablet Take 1 tablet (5 mg) by mouth 2 times daily 60 tablet 1     Pantoprazole Sodium (PROTONIX PO) Take 40 mg by mouth       simvastatin (ZOCOR) 40 MG tablet TAKE 1 TABLET BY MOUTH AT BEDTIME 90 tablet 1     zolpidem (AMBIEN) 10 MG tablet TAKE 1 TABLET BY MOUTH AT BEDTIME AS NEEDED FOR SLEEP 30 tablet 0     Allergies   Allergen Reactions     Codeine Nausea and Vomiting     Codeine Phosphate      Quinolones      Pt intolerance to steroids also     Tamiflu [Oseltamivir]  "Other (See Comments) and GI Disturbance     Stomach ache and headache        Reviewed and updated as needed this visit by clinical staff       Reviewed and updated as needed this visit by Provider         ROS:  Constitutional, HEENT, cardiovascular, pulmonary, gi and gu systems are negative, except as otherwise noted.    OBJECTIVE:     /56 (BP Location: Right arm, Patient Position: Sitting, Cuff Size: Adult Regular)  Pulse 56  Temp 96.5  F (35.8  C) (Tympanic)  Resp 20  Ht 5' 5\" (1.651 m)  Wt 170 lb (77.1 kg)  SpO2 98%  BMI 28.29 kg/m2  Body mass index is 28.29 kg/(m^2).  Physical Exam   Constitutional: She is oriented to person, place, and time. She appears well-developed and well-nourished. No distress.   HENT:   Head: Normocephalic. Head is with left periorbital erythema.   Right Ear: Hearing, tympanic membrane, external ear and ear canal normal.   Left Ear: Hearing, tympanic membrane, external ear and ear canal normal.   Nose: Nose normal. Right sinus exhibits no maxillary sinus tenderness and no frontal sinus tenderness. Left sinus exhibits no maxillary sinus tenderness and no frontal sinus tenderness.   Mouth/Throat: Uvula is midline and oropharynx is clear and moist. No oropharyngeal exudate or posterior oropharyngeal erythema.   Eyes: Conjunctivae are normal.   There is chemosis as well as periorbital erythema   Neck: Neck supple.   Pulmonary/Chest: Effort normal and breath sounds normal.   Lymphadenopathy:     She has no cervical adenopathy.   Neurological: She is alert and oriented to person, place, and time.   Skin: Skin is warm and dry.   Psychiatric: She has a normal mood and affect.         Diagnostic Test Results:  none     ASSESSMENT/PLAN:     {Persistent insomnia  Refilled  - zolpidem (AMBIEN) 10 MG tablet; TAKE 1 TABLET BY MOUTH AT BEDTIME AS NEEDED FOR SLEEP    Allergic conjunctivitis, left  Medrol dosepak as written.  Follow up with persistent symptoms.  - methylPREDNISolone (MEDROL " DOSEPAK) 4 MG tablet; Follow package instructions      Sam Tanner MD  [facility]

## 2018-10-15 NOTE — TELEPHONE ENCOUNTER
Called pharmacy they did not have eye drops on her medications there. Patient came in today to be seen.

## 2018-10-15 NOTE — NURSING NOTE
"Chief Complaint   Patient presents with     Eye Problem       Initial /56 (BP Location: Right arm, Patient Position: Sitting, Cuff Size: Adult Regular)  Pulse 56  Temp 96.5  F (35.8  C) (Tympanic)  Resp 20  Ht 5' 5\" (1.651 m)  Wt 170 lb (77.1 kg)  SpO2 98%  BMI 28.29 kg/m2 Estimated body mass index is 28.29 kg/(m^2) as calculated from the following:    Height as of this encounter: 5' 5\" (1.651 m).    Weight as of this encounter: 170 lb (77.1 kg).  Medication Reconciliation: complete    Bridget Cormier LPN  "

## 2018-10-15 NOTE — TELEPHONE ENCOUNTER
7:43 AM    Reason for Call: OVERBOOK    Patient is having the following symptoms: lft eye red & swollen shut for 3 days.    The patient is requesting an appointment for overbook with Dr. Sethi.    Was an appointment offered for this call? No  If yes : Appointment type              Date    Preferred method for responding to this message: Telephone Call  What is your phone number ? [phone]    If we cannot reach you directly, may we leave a detailed response at the number you provided? Yes    Can this message wait until your PCP/provider returns, if unavailable today? Not applicable, provider is in today    Sudha Marlow

## 2018-10-15 NOTE — MR AVS SNAPSHOT
After Visit Summary   10/15/2018    Mary Pemberton    MRN: [id number]           Patient Information     Date Of Birth          1955        Visit Information        Provider Department      10/15/2018 8:20 AM Sam Tanner MD [facility]        Today's Diagnoses     Allergic conjunctivitis, left    -  1    Persistent insomnia          Care Instructions    Take medrol dosepak as written          Follow-ups after your visit        Follow-up notes from your care team     Return if symptoms worsen or fail to improve.      Your next 10 appointments already scheduled     Feb 19, 2019 10:30 AM CST   [facility]    [address] [facility] [address]   [phone]            Feb 19, 2019 11:00 AM CST   (Arrive by 10:45 AM)   [facility] Jordan NP   [facility]    [address] [facility] [address] [facility] [address]   [phone]              Who to contact     If you have questions or need follow up information about today's clinic visit or your schedule please contact [facility] directly at [phone].  Normal or non-critical lab and imaging results will be communicated to you by MyChart, letter or phone within 4 business days after the clinic has received the results. If you do not hear from us within 7 days, please contact the clinic through MyChart or phone. If you have a critical or abnormal lab result, we will notify you by phone as soon as possible.  Submit refill requests through Next[employer] or call your pharmacy and they will forward the refill request to us. Please allow 3 business days for your refill to be completed.          Additional Information About Your Visit        [employer] Information     NextUser lets you send messages to your doctor, view your test results, renew your prescriptions, schedule appointments  "and more. To sign up, go to [email or web address] . Click on \"Log in\" on the left side of the screen, which will take you to the Welcome page. Then click on \"Sign up Now\" on the right side of the page.     You will be asked to enter the access code listed below, as well as some personal information. Please follow the directions to create your username and password.     Your access code is: [email or web address]  Expires: 2019  7:48 PM     Your access code will  in 90 days. If you need help or a new code, please call your [place] clinic or [phone].        Care EveryWhere ID     This is your Care EveryWhere ID. This could be used by other organizations to access your [facility] medical records  [id number]        Your Vitals Were     Pulse Temperature Respirations Height Pulse Oximetry BMI (Body Mass Index)    56 96.5  F (35.8  C) (Tympanic) 20 5' 5\" (1.651 m) 98% 28.29 kg/m2       Blood Pressure from Last 3 Encounters:   10/15/18 102/56   10/05/18 100/60   18 118/76    Weight from Last 3 Encounters:   10/15/18 170 lb (77.1 kg)   10/05/18 170 lb (77.1 kg)   18 175 lb (79.4 kg)              Today, you had the following     No orders found for display         Today's Medication Changes          These changes are accurate as of 10/15/18  7:48 PM.  If you have any questions, ask your nurse or doctor.               Start taking these medicines.        Dose/Directions    methylPREDNISolone 4 MG tablet   Commonly known as:  MEDROL DOSEPAK   Used for:  Allergic conjunctivitis, left   Started by:  Sam Tanner MD        Follow package instructions   Quantity:  21 tablet   Refills:  0            Where to get your medicines      These medications were sent to [id number] - [place] - [address]  [place] [address]     Phone:  [phone]     methylPREDNISolone 4 MG tablet         Some of these will need a paper prescription and others can be bought over the counter.  Ask " your nurse if you have questions.     Bring a paper prescription for each of these medications     zolpidem 10 MG tablet               Information about OPIOIDS     PRESCRIPTION OPIOIDS: WHAT YOU NEED TO KNOW   We gave you an opioid (narcotic) pain medicine. It is important to manage your pain, but opioids are not always the best choice. You should first try all the other options your care team gave you. Take this medicine for as short a time (and as few doses) as possible.    Some activities can increase your pain, such as bandage changes or therapy sessions. It may help to take your pain medicine 30 to 60 minutes before these activities. Reduce your stress by getting enough sleep, working on hobbies you enjoy and practicing relaxation or meditation. Talk to your care team about ways to manage your pain beyond prescription opioids.    These medicines have risks:    DO NOT drive when on new or higher doses of pain medicine. These medicines can affect your alertness and reaction times, and you could be arrested for driving under the influence (DUI). If you need to use opioids long-term, talk to your care team about driving.    DO NOT operate heavy machinery    DO NOT do any other dangerous activities while taking these medicines.    DO NOT drink any alcohol while taking these medicines.     If the opioid prescribed includes acetaminophen, DO NOT take with any other medicines that contain acetaminophen. Read all labels carefully. Look for the word  acetaminophen  or  Tylenol.  Ask your pharmacist if you have questions or are unsure.    You can get addicted to pain medicines, especially if you have a history of addiction (chemical, alcohol or substance dependence). Talk to your care team about ways to reduce this risk.    All opioids tend to cause constipation. Drink plenty of water and eat foods that have a lot of fiber, such as fruits, vegetables, prune juice, apple juice and high-fiber cereal. Take a laxative  (Miralax, milk of magnesia, Colace, Senna) if you don t move your bowels at least every other day. Other side effects include upset stomach, sleepiness, dizziness, throwing up, tolerance (needing more of the medicine to have the same effect), physical dependence and slowed breathing.    Store your pills in a secure place, locked if possible. We will not replace any lost or stolen medicine. If you don t finish your medicine, please throw away (dispose) as directed by your pharmacist. The [place] Pollution Control Agency has more information about safe disposal: [email or web address]-medications         Primary Care Provider Office Phone # Fax #    Sam Tanner [phone]       [facility] [facility] [address]        Equal Access to Services     DELVIN VILLEGAS : Benny frias Sokenadll, waaxjolene wong, jose torresaljuan maravilla, kelly sutherland . So [facility] [phone].    ATENCIÓN: Si habla español, tiene a mckinnon disposición servicios gratuitos de asistencia lingüística. Lind[place] al [phone].    We comply with applicable federal civil rights laws and [place] laws. We do not discriminate on the basis of race, color, national origin, age, disability, sex, sexual orientation, or gender identity.            Thank you!     Thank you for choosing [facility]  for your care. Our goal is always to provide you with excellent care. Hearing back from our patients is one way we can continue to improve our services. Please take a few minutes to complete the written survey that you may receive in the mail after your visit with us. Thank you!             Your Updated Medication List - Protect others around you: Learn how to safely use, store and throw away your medicines at www.disposemymeds.org.          This list is accurate as of 10/15/18  7:48 PM.  Always use your most recent med list.                   Brand  Name Dispense Instructions for use Diagnosis    ASPIRIN ADULT LOW STRENGTH 81 MG chewable tablet   Generic drug:  aspirin     36 tablet    Take 162 mg by mouth daily    CVA (cerebral infarction)       cefPROZIL 250 MG tablet    CEFZIL    20 tablet    Take 1 tablet (250 mg) by mouth 2 times daily    Acute recurrent maxillary sinusitis       citalopram 20 MG tablet    celeXA    90 tablet    TAKE ONE TABLET BY MOUTH ONCE DAILY    Depression, unspecified depression type       cyanocolbalamin 100 MCG tablet    vitamin  B-12     Take 50 mcg by mouth daily.        donepezil 10 MG tablet    ARICEPT    90 tablet    TAKE ONE TABLET BY MOUTH AT BEDTIME    Senile dementia       GABAPENTIN PO      Take 600 mg by mouth 3 times daily        HYDROcodone-acetaminophen 5-325 MG per tablet    NORCO          ibuprofen 600 MG tablet    ADVIL/MOTRIN          methylPREDNISolone 4 MG tablet    MEDROL DOSEPAK    21 tablet    Follow package instructions    Allergic conjunctivitis, left       OMEGA-3 FISH OIL PO      Take 1 capsule by mouth daily        oxybutynin 5 MG tablet    DITROPAN    60 tablet    Take 1 tablet (5 mg) by mouth 2 times daily    Frequency of urination       PROTONIX PO      Take 40 mg by mouth        simvastatin 40 MG tablet    ZOCOR    90 tablet    TAKE 1 TABLET BY MOUTH AT BEDTIME    Hyperlipidemia LDL goal <160       VITAMIN C PO           zolpidem 10 MG tablet    AMBIEN    30 tablet    TAKE 1 TABLET BY MOUTH AT BEDTIME AS NEEDED FOR SLEEP    Persistent insomnia

## 2018-10-29 ENCOUNTER — OFFICE VISIT (OUTPATIENT)
Dept: FAMILY MEDICINE | Facility: OTHER | Age: 63
End: 2018-10-29
Attending: FAMILY MEDICINE
Payer: MEDICARE

## 2018-10-29 VITALS
SYSTOLIC BLOOD PRESSURE: 110 MMHG | WEIGHT: 174 LBS | HEART RATE: 61 BPM | DIASTOLIC BLOOD PRESSURE: 62 MMHG | TEMPERATURE: 96.8 F | BODY MASS INDEX: 28.96 KG/M2 | OXYGEN SATURATION: 96 %

## 2018-10-29 DIAGNOSIS — L20.82 FLEXURAL ECZEMA: Primary | ICD-10-CM

## 2018-10-29 PROCEDURE — 99213 OFFICE O/P EST LOW 20 MIN: CPT | Performed by: FAMILY MEDICINE

## 2018-10-29 PROCEDURE — G0463 HOSPITAL OUTPT CLINIC VISIT: HCPCS

## 2018-10-29 RX ORDER — TRIAMCINOLONE ACETONIDE 1 MG/G
CREAM TOPICAL
Qty: 30 G | Refills: 1 | Status: SHIPPED | OUTPATIENT
Start: 2018-10-29

## 2018-10-29 ASSESSMENT — ANXIETY QUESTIONNAIRES
2. NOT BEING ABLE TO STOP OR CONTROL WORRYING: NOT AT ALL
6. BECOMING EASILY ANNOYED OR IRRITABLE: NOT AT ALL
7. FEELING AFRAID AS IF SOMETHING AWFUL MIGHT HAPPEN: NOT AT ALL
IF YOU CHECKED OFF ANY PROBLEMS ON THIS QUESTIONNAIRE, HOW DIFFICULT HAVE THESE PROBLEMS MADE IT FOR YOU TO DO YOUR WORK, TAKE CARE OF THINGS AT HOME, OR GET ALONG WITH OTHER PEOPLE: NOT DIFFICULT AT ALL
5. BEING SO RESTLESS THAT IT IS HARD TO SIT STILL: NOT AT ALL
3. WORRYING TOO MUCH ABOUT DIFFERENT THINGS: NOT AT ALL
GAD7 TOTAL SCORE: 1
1. FEELING NERVOUS, ANXIOUS, OR ON EDGE: NOT AT ALL

## 2018-10-29 ASSESSMENT — PAIN SCALES - GENERAL: PAINLEVEL: NO PAIN (0)

## 2018-10-29 ASSESSMENT — PATIENT HEALTH QUESTIONNAIRE - PHQ9
5. POOR APPETITE OR OVEREATING: SEVERAL DAYS
SUM OF ALL RESPONSES TO PHQ QUESTIONS 1-9: 0

## 2018-10-29 NOTE — PROGRESS NOTES
SUBJECTIVE:   Mary Pemberton is a 63 year old female who presents to clinic today for the following health issues:      Eye(s) Problem      Duration: Ongoing; this flare up 3 days    Description:  Location: bilateral  Pain: YES- if touched  Redness: YES  Discharge: YES    Accompanying signs and symptoms: itchy and swollen    History (Trauma, foreign body exposure,): None    Precipitating or alleviating factors (contact use): None    Therapies tried and outcome: See medication record  Mary would like an ointment sent in for her which worked in the past but hasn't been found in her chart. It had worked for her in the past. The irritation occurs at the corners of the eyes.. They eyes themselves are not affected        Problem list and histories reviewed & adjusted, as indicated.  Additional history: as documented    Patient Active Problem List   Diagnosis     Malignant melanoma, metastatic (H)     Dyslipidemia     Major depressive disorder, recurrent episode, mild (H)     GERD (gastroesophageal reflux disease)     Nonspecific abnormal electrocardiogram (ECG) (EKG)     Prediabetes     OAB (overactive bladder)     Cognitive deficit following cerebrovascular accident (CVA)     ACP (advance care planning)     TIA (transient ischemic attack)     Superficial siderosis of central nervous system     Obesity     Partial idiopathic epilepsy with seizures of localized onset, not intractable, with status epilepticus (H)     Seizure disorder (H)     Health Care Home     Past Surgical History:   Procedure Laterality Date     arm      LT     brain bx       COLONOSCOPY  [id number]     open heart         Social History   Substance Use Topics     Smoking status: Former Smoker     Packs/day: 1.00     Years: 20.00     Types: Cigarettes     Start date: 8/20/1969     Quit date: 6/6/2002     Smokeless tobacco: Never Used      Comment: year quit 2001; no passive exposure.     Alcohol use No     Family History   Problem Relation Age of Onset      Cancer Father      lung     Other Cancer Father      lung     Hypertension Mother      Coronary Artery Disease Mother      Cancer Maternal Grandfather      Other Cancer Maternal Grandfather      Coronary Artery Disease Paternal Uncle      Diabetes No family hx of      Hyperlipidemia No family hx of      Cerebrovascular Disease No family hx of      Breast Cancer No family hx of      Colon Cancer No family hx of      Prostate Cancer No family hx of      Thyroid Disease No family hx of      Genetic Disorder No family hx of      Anesthesia Reaction No family hx of      Substance Abuse No family hx of          Current Outpatient Prescriptions   Medication Sig Dispense Refill     Ascorbic Acid (VITAMIN C PO)        aspirin (ASPIRIN ADULT LOW STRENGTH) 81 MG chewable tablet Take 162 mg by mouth daily  36 tablet      cefPROZIL (CEFZIL) 250 MG tablet Take 1 tablet (250 mg) by mouth 2 times daily 20 tablet 0     citalopram (CELEXA) 20 MG tablet TAKE ONE TABLET BY MOUTH ONCE DAILY 90 tablet 1     cyanocolbalamin (VITAMIN  B-12) 100 MCG tablet Take 50 mcg by mouth daily.       donepezil (ARICEPT) 10 MG tablet TAKE ONE TABLET BY MOUTH AT BEDTIME 90 tablet 0     GABAPENTIN PO Take 600 mg by mouth 3 times daily       gentamicin (GARAMYCIN) 0.3 % ophthalmic ointment Apply three times daily to lid and area around eye as needed 3.5 g 3     HYDROcodone-acetaminophen (NORCO) 5-325 MG per tablet   0     ibuprofen (ADVIL/MOTRIN) 600 MG tablet   0     Omega-3 Fatty Acids (OMEGA-3 FISH OIL PO) Take 1 capsule by mouth daily        oxybutynin (DITROPAN) 5 MG tablet Take 1 tablet (5 mg) by mouth 2 times daily 60 tablet 1     Pantoprazole Sodium (PROTONIX PO) Take 40 mg by mouth       simvastatin (ZOCOR) 40 MG tablet TAKE 1 TABLET BY MOUTH AT BEDTIME 90 tablet 1     triamcinolone (KENALOG) 0.1 % cream Apply sparingly to affected area three times daily for 14 days. 30 g 1     zolpidem (AMBIEN) 10 MG tablet TAKE 1 TABLET BY MOUTH AT BEDTIME AS  NEEDED FOR SLEEP 30 tablet 0       Reviewed and updated as needed this visit by clinical staff  Tobacco  Allergies  Meds  Med Hx  Surg Hx  Fam Hx  Soc Hx      Reviewed and updated as needed this visit by Provider         ROS:  CONSTITUTIONAL:NEGATIVE for fever, chills, change in weight  EYES: NEGATIVE for vision changes or irritation,  NEURO: NEGATIVE for weakness, dizziness or paresthesias  PSYCHIATRIC: NEGATIVE for changes in mood or affect    OBJECTIVE:                                                    /62 (BP Location: Right arm, Patient Position: Sitting, Cuff Size: Adult Regular)  Pulse 61  Temp 96.8  F (36  C) (Tympanic)  Wt 174 lb (78.9 kg)  SpO2 96%  BMI 28.96 kg/m2  Body mass index is 28.96 kg/(m^2).  GENERAL APPEARANCE: healthy, alert and no distress  EYES: Eyes grossly normal to inspection, PERRL, conjunctivae and sclerae normal and  the lateral of the eyes are notable for erythema and eczematous irritation  NEURO: Normal strength and tone, mentation intact and speech normal  PSYCH: mentation appears normal and worried         ASSESSMENT/PLAN:                                                    1. Flexural eczema  We called [facility] and found that gentamycin was prescribed. Will renew this for her as well as triamcinolone cream to use if needed  - triamcinolone (KENALOG) 0.1 % cream; Apply sparingly to affected area three times daily for 14 days.  Dispense: 30 g; Refill: 1  - gentamicin (GARAMYCIN) 0.3 % ophthalmic ointment; Apply three times daily to lid and area around eye as needed  Dispense: 3.5 g; Refill: 3      Follow up with Provider - cassandra Lester MD  [facility] - [place]

## 2018-10-29 NOTE — MR AVS SNAPSHOT
"              After Visit Summary   10/29/2018    Mary Pemberton    MRN: [id number]           Patient Information     Date Of Birth          1955        Visit Information        Provider Department      10/29/2018 9:15 AM Misti Lester MD [facility]        Today's Diagnoses     Flexural eczema    -  1       Follow-ups after your visit        Your next 10 appointments already scheduled     Feb 19, 2019 10:30 AM CST   LAB with HC LAB   [facility] ([facility] )    [address]  [facility] [address]   [phone]            Feb 19, 2019 11:00 AM CST   (Arrive by 10:45 AM)   Return Visit with Soni Jordan NP   [facility] ([facility] )    [address]  [place] [address]   [phone]              Who to contact     If you have questions or need follow up information about today's clinic visit or your schedule please contact [facility] directly at [phone].  Normal or non-critical lab and imaging results will be communicated to you by Soteirahart, letter or phone within 4 business days after the clinic has received the results. If you do not hear from us within 7 days, please contact the clinic through Soteirahart or phone. If you have a critical or abnormal lab result, we will notify you by phone as soon as possible.  Submit refill requests through DataNitro or call your pharmacy and they will forward the refill request to us. Please allow 3 business days for your refill to be completed.          Additional Information About Your Visit        [employer] Information     DataNitro lets you send messages to your doctor, view your test results, renew your prescriptions, schedule appointments and more. To sign up, go to [email or web address] . Click on \"Log in\" on the left side of the screen, which will take you to the Welcome page. Then click on \"Sign up Now\" on the right side of " the page.     You will be asked to enter the access code listed below, as well as some personal information. Please follow the directions to create your username and password.     Your access code is: [email or web address]  Expires: 2019  7:48 PM     Your access code will  in 90 days. If you need help or a new code, please call your [place] clinic or [phone].        Care EveryWhere ID     This is your Care EveryWhere ID. This could be used by other organizations to access your [place] medical records  [id number]        Your Vitals Were     Pulse Temperature Pulse Oximetry BMI (Body Mass Index)          61 96.8  F (36  C) (Tympanic) 96% 28.96 kg/m2         Blood Pressure from Last 3 Encounters:   10/29/18 110/62   10/15/18 102/56   10/05/18 100/60    Weight from Last 3 Encounters:   10/29/18 174 lb (78.9 kg)   10/15/18 170 lb (77.1 kg)   10/05/18 170 lb (77.1 kg)              Today, you had the following     No orders found for display         Today's Medication Changes          These changes are accurate as of 10/29/18 10:06 AM.  If you have any questions, ask your nurse or doctor.               Start taking these medicines.        Dose/Directions    gentamicin 0.3 % ophthalmic ointment   Commonly known as:  GARAMYCIN   Used for:  Flexural eczema   Started by:  Misti Lester MD        Apply three times daily to lid and area around eye as needed   Quantity:  3.5 g   Refills:  3       triamcinolone 0.1 % cream   Commonly known as:  KENALOG   Used for:  Flexural eczema   Started by:  Misti Lester MD        Apply sparingly to affected area three times daily for 14 days.   Quantity:  30 g   Refills:  1            Where to get your medicines      These medications were sent to [id number] [address] [place] - [address] [place] [address]     Phone:  [phone]     gentamicin 0.3 % ophthalmic ointment    triamcinolone 0.1 % cream                Primary Care Provider Office Phone #  Fax #    Sam Tanner [phone] [address] [facility] [address]        Equal Access to Services     DELVIN VILLEGAS : Hadii aad ku hadbarbieelicia Danitakendall, rayrayjolene melgarmkha, zhaota katanisha maravilla, kelly saharain hayaan brunocasie perry lajefersoncamila bridges. So [facility] [phone].    ATENCIÓN: Si habla español, tiene a mckinnon disposición servicios gratuitos de asistencia lingüística. Llame al [phone].    We comply with applicable federal civil rights laws and [place] laws. We do not discriminate on the basis of race, color, national origin, age, disability, sex, sexual orientation, or gender identity.            Thank you!     Thank you for choosing [facility]  for your care. Our goal is always to provide you with excellent care. Hearing back from our patients is one way we can continue to improve our services. Please take a few minutes to complete the written survey that you may receive in the mail after your visit with us. Thank you!             Your Updated Medication List - Protect others around you: Learn how to safely use, store and throw away your medicines at www.disposemymeds.org.          This list is accurate as of 10/29/18 10:06 AM.  Always use your most recent med list.                   Brand Name Dispense Instructions for use Diagnosis    ASPIRIN ADULT LOW STRENGTH 81 MG chewable tablet   Generic drug:  aspirin     36 tablet    Take 162 mg by mouth daily    CVA (cerebral infarction)       cefPROZIL 250 MG tablet    CEFZIL    20 tablet    Take 1 tablet (250 mg) by mouth 2 times daily    Acute recurrent maxillary sinusitis       citalopram 20 MG tablet    celeXA    90 tablet    TAKE ONE TABLET BY MOUTH ONCE DAILY    Depression, unspecified depression type       cyanocolbalamin 100 MCG tablet    vitamin  B-12     Take 50 mcg by mouth daily.        donepezil 10 MG tablet    ARICEPT    90 tablet    TAKE ONE TABLET BY MOUTH AT BEDTIME    Senile dementia       GABAPENTIN  PO      Take 600 mg by mouth 3 times daily        gentamicin 0.3 % ophthalmic ointment    GARAMYCIN    3.5 g    Apply three times daily to lid and area around eye as needed    Flexural eczema       HYDROcodone-acetaminophen 5-325 MG per tablet    NORCO          ibuprofen 600 MG tablet    ADVIL/MOTRIN          OMEGA-3 FISH OIL PO      Take 1 capsule by mouth daily        oxybutynin 5 MG tablet    DITROPAN    60 tablet    Take 1 tablet (5 mg) by mouth 2 times daily    Frequency of urination       PROTONIX PO      Take 40 mg by mouth        simvastatin 40 MG tablet    ZOCOR    90 tablet    TAKE 1 TABLET BY MOUTH AT BEDTIME    Hyperlipidemia LDL goal <160       triamcinolone 0.1 % cream    KENALOG    30 g    Apply sparingly to affected area three times daily for 14 days.    Flexural eczema       VITAMIN C PO           zolpidem 10 MG tablet    AMBIEN    30 tablet    TAKE 1 TABLET BY MOUTH AT BEDTIME AS NEEDED FOR SLEEP    Persistent insomnia

## 2018-10-29 NOTE — NURSING NOTE
"Chief Complaint   Patient presents with     Eye Problem       Initial /62 (BP Location: Right arm, Patient Position: Sitting, Cuff Size: Adult Regular)  Pulse 61  Temp 96.8  F (36  C) (Tympanic)  Wt 174 lb (78.9 kg)  SpO2 96%  BMI 28.96 kg/m2 Estimated body mass index is 28.96 kg/(m^2) as calculated from the following:    Height as of 10/15/18: 5' 5\" (1.651 m).    Weight as of this encounter: 174 lb (78.9 kg).  Medication Reconciliation: complete    Sarah Morel LPN  "

## 2018-10-30 ASSESSMENT — ANXIETY QUESTIONNAIRES: GAD7 TOTAL SCORE: 1

## 2018-11-02 DIAGNOSIS — L20.82 FLEXURAL ECZEMA: Primary | ICD-10-CM

## 2018-11-02 RX ORDER — ERYTHROMYCIN 5 MG/G
OINTMENT OPHTHALMIC
Qty: 3.5 G | Refills: 3 | Status: SHIPPED | OUTPATIENT
Start: 2018-11-02

## 2018-11-02 NOTE — TELEPHONE ENCOUNTER
Received note from pharmacy in regards to gentamicin 0.3 % opthalmic ointment ordered and they stated that the  out and to please substitute.  I have pended the suggestion from the pharmacy.  Please review and sign if appropriate. For Flexural Eczema.  I called [facility] to see if they had what was originally requested and they did not.    Thank you.

## 2018-11-06 DIAGNOSIS — K21.9 ESOPHAGEAL REFLUX: ICD-10-CM

## 2018-11-06 DIAGNOSIS — K21.9 GASTROESOPHAGEAL REFLUX DISEASE, ESOPHAGITIS PRESENCE NOT SPECIFIED: Primary | ICD-10-CM

## 2018-11-07 RX ORDER — PANTOPRAZOLE SODIUM 40 MG/1
TABLET, DELAYED RELEASE ORAL
Qty: 90 TABLET | Refills: 3 | Status: SHIPPED | OUTPATIENT
Start: 2018-11-07

## 2018-12-27 DIAGNOSIS — G47.00 PERSISTENT INSOMNIA: ICD-10-CM

## 2018-12-27 NOTE — TELEPHONE ENCOUNTER
ZOLPIDEM 10MG       TAB      Last Written Prescription Date:  10/15/18  Last Fill Quantity: 30,   # refills: 0  Last Office Visit: 10/29/18  Future Office visit:    Next 5 appointments (look out 90 days)    Feb 19, 2019 11:00 AM CST  (Arrive by 10:45 AM)  Return Visit with Soni Jordan NP  [facility] ([facility] ) [address]  [place] [address]  [phone]           Routing refill request to provider for review/approval because:    Radha Morales MA

## 2018-12-28 RX ORDER — ZOLPIDEM TARTRATE 10 MG/1
TABLET ORAL
Qty: 30 TABLET | Refills: 0 | Status: SHIPPED | OUTPATIENT
Start: 2018-12-28 | End: 2019-01-29

## 2018-12-31 NOTE — PROGRESS NOTES
SUBJECTIVE:   Mary Pemberton is a 63 year old female who presents to clinic today for the following health issues:      Seizure disorder:    Amount of exercise or physical activity: No exercises, just light housework    Problems taking medications regularly: No    Medication side effects: none    Diet: regular (no restrictions)    Mary has a history of seizure disorder and has not had recurrence since starting on Keppra.  She also has a history of melanoma and is follow by Oncology.      URINARY TRACT SYMPTOMS      Duration: 3 days    Description  dysuria, frequency, urgency and odor    Intensity:  mild    Accompanying signs and symptoms:  Fever/chills: no   Flank pain no   Nausea and vomiting: no   Vaginal symptoms: none  Abdominal/Pelvic Pain: no     History  History of frequent UTI's: no   History of kidney stones: no   Sexually Active: no   Possibility of pregnancy: No    Precipitating or alleviating factors: None    Therapies tried and outcome: none        Problem list and histories reviewed & adjusted, as indicated.  Additional history: as documented    Patient Active Problem List   Diagnosis     Malignant melanoma, metastatic (H)     Dyslipidemia     Major depressive disorder, recurrent episode, mild (H)     GERD (gastroesophageal reflux disease)     Nonspecific abnormal electrocardiogram (ECG) (EKG)     Prediabetes     OAB (overactive bladder)     Cognitive deficit following cerebrovascular accident (CVA)     ACP (advance care planning)     TIA (transient ischemic attack)     Superficial siderosis of central nervous system     Obesity     Partial idiopathic epilepsy with seizures of localized onset, not intractable, with status epilepticus (H)     Seizure disorder (H)     Health Care Home     Past Surgical History:   Procedure Laterality Date     arm      LT     brain bx       COLONOSCOPY  [id number]     open heart         Social History   Substance Use Topics     Smoking status: Former Smoker     Packs/day:  Social Service Note



SW spoke with Chrissy Bergman Call [phone] to clarify decision maker for patient.  Ms. 
Padmini Krishna and patient have been cohabitating for over 25 years.  They are not 
legally .  Chrissy Lawson is the dgt of Padmini Krishna 
Anticipated trach 1.00     Years: 20.00     Types: Cigarettes     Start date: 8/20/1969     Quit date: 6/6/2002     Smokeless tobacco: Never Used      Comment: year quit 2001; no passive exposure.     Alcohol use No     Family History   Problem Relation Age of Onset     Cancer Father      lung     Other Cancer Father      lung     Hypertension Mother      Coronary Artery Disease Mother      Cancer Maternal Grandfather      Other Cancer Maternal Grandfather      Coronary Artery Disease Paternal Uncle      Diabetes No family hx of      Hyperlipidemia No family hx of      Cerebrovascular Disease No family hx of      Breast Cancer No family hx of      Colon Cancer No family hx of      Prostate Cancer No family hx of      Thyroid Disease No family hx of      Genetic Disorder No family hx of      Anesthesia Reaction No family hx of      Substance Abuse No family hx of          Current Outpatient Prescriptions   Medication Sig Dispense Refill     Ascorbic Acid (VITAMIN C PO)        aspirin (ASPIRIN ADULT LOW STRENGTH) 81 MG chewable tablet Take 162 mg by mouth daily  36 tablet      cefPROZIL (CEFZIL) 250 MG tablet Take 1 tablet (250 mg) by mouth 2 times daily 20 tablet 0     citalopram (CELEXA) 20 MG tablet TAKE ONE TABLET BY MOUTH ONCE DAILY 90 tablet 1     cyanocolbalamin (VITAMIN  B-12) 100 MCG tablet Take 50 mcg by mouth daily.       donepezil (ARICEPT) 10 MG tablet TAKE ONE TABLET BY MOUTH AT BEDTIME 90 tablet 0     GABAPENTIN PO Take 600 mg by mouth 3 times daily       Omega-3 Fatty Acids (OMEGA-3 FISH OIL PO) Take 1 capsule by mouth daily        oxybutynin (DITROPAN) 5 MG tablet Take 1 tablet (5 mg) by mouth 2 times daily 60 tablet 1     Pantoprazole Sodium (PROTONIX PO) Take 40 mg by mouth       simvastatin (ZOCOR) 40 MG tablet TAKE 1 TABLET BY MOUTH AT BEDTIME 90 tablet 1     zolpidem (AMBIEN) 10 MG tablet TAKE 1 TABLET BY MOUTH AT BEDTIME AS NEEDED FOR SLEEP 30 tablet 0     Allergies   Allergen Reactions     Codeine Nausea and  Vomiting     Codeine Phosphate      Quinolones      Pt intolerance to steroids also     Tamiflu [Oseltamivir] Other (See Comments) and GI Disturbance     Stomach ache and headache      BP Readings from Last 3 Encounters:   10/05/18 100/60   09/11/18 118/76   08/20/18 104/68    Wt Readings from Last 3 Encounters:   10/05/18 170 lb (77.1 kg)   09/11/18 175 lb (79.4 kg)   08/20/18 175 lb 3.2 oz (79.5 kg)                    Reviewed and updated as needed this visit by clinical staff       Reviewed and updated as needed this visit by Provider         ROS:  Constitutional, HEENT, cardiovascular, pulmonary, gi and gu systems are negative, except as otherwise noted.    OBJECTIVE:     /60 (BP Location: Right arm, Patient Position: Chair, Cuff Size: Adult Large)  Pulse 66  Temp 99.1  F (37.3  C) (Tympanic)  Resp 20  Wt 170 lb (77.1 kg)  SpO2 96%  BMI 28.29 kg/m2  Body mass index is 28.29 kg/(m^2).  Physical Exam   Constitutional: She is oriented to person, place, and time. She appears well-developed and well-nourished. No distress.   Neurological: She is alert and oriented to person, place, and time.   Psychiatric: She has a normal mood and affect.     Other exam not repeated    Diagnostic Test Results:  Results for orders placed or performed in visit on 10/05/18   CBC with platelets differential   Result Value Ref Range    WBC 6.8 4.0 - 11.0 10e9/L    RBC Count 4.63 3.8 - 5.2 10e12/L    Hemoglobin 14.3 11.7 - 15.7 g/dL    Hematocrit 41.6 35.0 - 47.0 %    MCV 90 78 - 100 fl    MCH 30.9 26.5 - 33.0 pg    MCHC 34.4 31.5 - 36.5 g/dL    RDW 12.7 10.0 - 15.0 %    Platelet Count 289 150 - 450 10e9/L    Diff Method Automated Method     % Neutrophils 56.2 %    % Lymphocytes 31.8 %    % Monocytes 8.9 %    % Eosinophils 1.9 %    % Basophils 0.9 %    % Immature Granulocytes 0.3 %    Nucleated RBCs 0 0 /100    Absolute Neutrophil 3.8 1.6 - 8.3 10e9/L    Absolute Lymphocytes 2.2 0.8 - 5.3 10e9/L    Absolute Monocytes 0.6 0.0 -  1.3 10e9/L    Absolute Eosinophils 0.1 0.0 - 0.7 10e9/L    Absolute Basophils 0.1 0.0 - 0.2 10e9/L    Abs Immature Granulocytes 0.0 0 - 0.4 10e9/L    Absolute Nucleated RBC 0.0    Basic metabolic panel   Result Value Ref Range    Sodium 136 133 - 144 mmol/L    Potassium 4.3 3.4 - 5.3 mmol/L    Chloride 105 94 - 109 mmol/L    Carbon Dioxide 21 20 - 32 mmol/L    Anion Gap 10 3 - 14 mmol/L    Glucose 118 (H) 70 - 99 mg/dL    Urea Nitrogen 13 7 - 30 mg/dL    Creatinine 0.77 0.52 - 1.04 mg/dL    GFR Estimate 76 >60 mL/min/1.7m2    GFR Estimate If Black >90 >60 mL/min/1.7m2    Calcium 9.2 8.5 - 10.1 mg/dL   UA reflex to Microscopic and Culture   Result Value Ref Range    Color Urine Yellow     Appearance Urine Clear     Glucose Urine Negative NEG^Negative mg/dL    Bilirubin Urine Negative NEG^Negative    Ketones Urine Negative NEG^Negative mg/dL    Specific Gravity Urine 1.015 1.003 - 1.035    Blood Urine Negative NEG^Negative    pH Urine 6.0 4.7 - 8.0 pH    Protein Albumin Urine 10 (A) NEG^Negative mg/dL    Urobilinogen mg/dL Normal 0.0 - 2.0 mg/dL    Nitrite Urine Negative NEG^Negative    Leukocyte Esterase Urine Trace (A) NEG^Negative    Source Midstream Urine     RBC Urine 2 0 - 2 /HPF    WBC Urine 4 0 - 5 /HPF    Bacteria Urine None (A) NEG^Negative /HPF    Squamous Epithelial /HPF Urine 1 0 - 1 /HPF       ASSESSMENT/PLAN:     Dysuria  UA reviewed. Suspect UTI.  Cefprozil as written.  - UA reflex to Microscopic and Culture  - cefPROZIL (CEFZIL) 250 MG tablet; Take 1 tablet (250 mg) by mouth 2 times daily    Malignant melanoma, metastatic (H)  Labs drawn.  - CBC with platelets differential  - Basic metabolic panel    Partial idiopathic epilepsy with seizures of localized onset, not intractable, with status epilepticus (H)  Stable.  She will continue same medications as outlined.  Follow up 6 months.    Sam Tanner MD  [facility] [place]

## 2019-01-01 DIAGNOSIS — E78.5 HYPERLIPIDEMIA LDL GOAL <160: ICD-10-CM

## 2019-01-03 RX ORDER — SIMVASTATIN 40 MG
TABLET ORAL
Qty: 90 TABLET | Refills: 1 | Status: SHIPPED | OUTPATIENT
Start: 2019-01-03 | End: 2019-04-17

## 2019-01-03 NOTE — TELEPHONE ENCOUNTER
simvastatin (ZOCOR) 40 MG tablet  Last Written Prescription Date:  7/6/18  Last Fill Quantity: 90,   # refills: 1  Last Office Visit: 10/29/18  Future Office visit:    Next 5 appointments (look out 90 days)    Feb 19, 2019 11:00 AM CST  (Arrive by 10:45 AM)  Return Visit with Soni Jordan NP  [facility] ([facility] ) [address]  [place] [address]  [phone]

## 2019-01-29 DIAGNOSIS — G47.00 PERSISTENT INSOMNIA: ICD-10-CM

## 2019-01-29 RX ORDER — ZOLPIDEM TARTRATE 10 MG/1
TABLET ORAL
Qty: 30 TABLET | Refills: 0 | Status: SHIPPED | OUTPATIENT
Start: 2019-01-29 | End: 2019-03-01

## 2019-01-29 NOTE — TELEPHONE ENCOUNTER
Ambien  Last visit: 10.29.18  Last refill: 12.28.18 #30    Future appointments:   Next 5 appointments (look out 90 days)    Feb 19, 2019 11:00 AM CST  (Arrive by 10:45 AM)  Return Visit with Soni Jordan NP  [facility] ([facility] ) [address]  [place] [address]  [phone]

## 2019-02-05 ENCOUNTER — OFFICE VISIT (OUTPATIENT)
Dept: FAMILY MEDICINE | Facility: OTHER | Age: 64
End: 2019-02-05
Attending: FAMILY MEDICINE
Payer: MEDICARE

## 2019-02-05 VITALS
HEIGHT: 65 IN | OXYGEN SATURATION: 97 % | TEMPERATURE: 97.7 F | SYSTOLIC BLOOD PRESSURE: 106 MMHG | RESPIRATION RATE: 20 BRPM | BODY MASS INDEX: 28.66 KG/M2 | WEIGHT: 172 LBS | HEART RATE: 66 BPM | DIASTOLIC BLOOD PRESSURE: 62 MMHG

## 2019-02-05 DIAGNOSIS — G40.001 PARTIAL IDIOPATHIC EPILEPSY WITH SEIZURES OF LOCALIZED ONSET, NOT INTRACTABLE, WITH STATUS EPILEPTICUS (H): Primary | ICD-10-CM

## 2019-02-05 PROCEDURE — G0463 HOSPITAL OUTPT CLINIC VISIT: HCPCS

## 2019-02-05 PROCEDURE — 99213 OFFICE O/P EST LOW 20 MIN: CPT | Performed by: FAMILY MEDICINE

## 2019-02-05 RX ORDER — GABAPENTIN 600 MG/1
600 TABLET ORAL 3 TIMES DAILY
Qty: 270 TABLET | Refills: 3 | Status: SHIPPED | OUTPATIENT
Start: 2019-02-05 | End: 2020-02-05

## 2019-02-05 ASSESSMENT — MIFFLIN-ST. JEOR: SCORE: 1331.07

## 2019-02-05 ASSESSMENT — PAIN SCALES - GENERAL: PAINLEVEL: NO PAIN (0)

## 2019-02-05 NOTE — PROGRESS NOTES
SUBJECTIVE:   Mary Pemberton is a 63 year old female who presents to clinic today for the following health issues:    Malignant melanoma      Duration: 2005    Description (location/character/radiation): metastatic to brain. Original skin left arm    Intensity:  NA    Accompanying signs and symptoms: None    History (similar episodes/previous evaluation): Oncology    Precipitating or alleviating factors: None    Therapies tried and outcome: None       Seizure disorder      Duration: Years    Description (location/character/radiation): Generalized    Intensity:  moderate    Accompanying signs and symptoms: None    History (similar episodes/previous evaluation): Neurology follow up    Precipitating or alleviating factors: none    Therapies tried and outcome: antiseizure medications          Cerebrovascular disease      Duration: previous    Description (location/character/radiation): TIA    Intensity:  moderate    Accompanying signs and symptoms: Facial weakness    History (similar episodes/previous evaluation): None    Precipitating or alleviating factors: None    Therapies tried and outcome: None       Problem list and histories reviewed & adjusted, as indicated.  Additional history: as documented    Patient Active Problem List   Diagnosis     Malignant melanoma, metastatic (H)     Dyslipidemia     Major depressive disorder, recurrent episode, mild (H)     GERD (gastroesophageal reflux disease)     Nonspecific abnormal electrocardiogram (ECG) (EKG)     Prediabetes     OAB (overactive bladder)     Cognitive deficit following cerebrovascular accident (CVA)     ACP (advance care planning)     TIA (transient ischemic attack)     Superficial siderosis of central nervous system     Obesity     Partial idiopathic epilepsy with seizures of localized onset, not intractable, with status epilepticus (H)     Seizure disorder (H)     Health Care Home     Past Surgical History:   Procedure Laterality Date     arm      LT      brain bx       COLONOSCOPY  2008     open heart         Social History     Tobacco Use     Smoking status: Former Smoker     Packs/day: 1.00     Years: 20.00     Pack years: 20.00     Types: Cigarettes     Start date: 1969     Last attempt to quit: 2002     Years since quittin.6     Smokeless tobacco: Never Used     Tobacco comment: year quit ; no passive exposure.   Substance Use Topics     Alcohol use: No     Family History   Problem Relation Age of Onset     Cancer Father         lung     Other Cancer Father         lung     Hypertension Mother      Coronary Artery Disease Mother      Cancer Maternal Grandfather      Other Cancer Maternal Grandfather      Coronary Artery Disease Paternal Uncle      Diabetes No family hx of      Hyperlipidemia No family hx of      Cerebrovascular Disease No family hx of      Breast Cancer No family hx of      Colon Cancer No family hx of      Prostate Cancer No family hx of      Thyroid Disease No family hx of      Genetic Disorder No family hx of      Anesthesia Reaction No family hx of      Substance Abuse No family hx of          Current Outpatient Medications   Medication Sig Dispense Refill     Ascorbic Acid (VITAMIN C PO)        aspirin (ASPIRIN ADULT LOW STRENGTH) 81 MG chewable tablet Take 162 mg by mouth daily  36 tablet      citalopram (CELEXA) 20 MG tablet TAKE 1 TABLET BY MOUTH ONCE DAILY 90 tablet 1     cyanocolbalamin (VITAMIN  B-12) 100 MCG tablet Take 50 mcg by mouth daily.       donepezil (ARICEPT) 10 MG tablet TAKE 1 TABLET BY MOUTH AT BEDTIME 90 tablet 2     erythromycin (ROMYCIN) ophthalmic ointment Apply three to four times daily to lid and area around eye as needed. 3.5 g 3     GABAPENTIN PO Take 600 mg by mouth 3 times daily       gentamicin (GARAMYCIN) 0.3 % ophthalmic ointment Apply three times daily to lid and area around eye as needed 3.5 g 3     HYDROcodone-acetaminophen (NORCO) 5-325 MG per tablet   0     ibuprofen  "(ADVIL/MOTRIN) 600 MG tablet   0     Omega-3 Fatty Acids (OMEGA-3 FISH OIL PO) Take 1 capsule by mouth daily        oxybutynin (DITROPAN) 5 MG tablet Take 1 tablet (5 mg) by mouth 2 times daily 60 tablet 1     pantoprazole (PROTONIX) 40 MG EC tablet TAKE ONE TABLET BY MOUTH ONCE DAILY 90 tablet 3     Pantoprazole Sodium (PROTONIX PO) Take 40 mg by mouth       simvastatin (ZOCOR) 40 MG tablet TAKE 1 TABLET BY MOUTH AT BEDTIME 90 tablet 1     triamcinolone (KENALOG) 0.1 % cream Apply sparingly to affected area three times daily for 14 days. 30 g 1     zolpidem (AMBIEN) 10 MG tablet TAKE 1 TABLET BY MOUTH ONCE DAILY AT BEDTIME AS NEEDED FOR SLEEP 30 tablet 0     Allergies   Allergen Reactions     Codeine Nausea and Vomiting     Codeine Phosphate      Quinolones      Pt intolerance to steroids also     Tamiflu [Oseltamivir] Other (See Comments) and GI Disturbance     Stomach ache and headache      BP Readings from Last 3 Encounters:   02/05/19 106/62   10/29/18 110/62   10/15/18 102/56    Wt Readings from Last 3 Encounters:   02/05/19 78 kg (172 lb)   10/29/18 78.9 kg (174 lb)   10/15/18 77.1 kg (170 lb)                    Reviewed and updated as needed this visit by clinical staff  Tobacco  Allergies  Meds  Problems  Med Hx  Surg Hx  Fam Hx       Reviewed and updated as needed this visit by Provider         ROS:  Constitutional, HEENT, cardiovascular, pulmonary, gi and gu systems are negative, except as otherwise noted.    OBJECTIVE:     /62 (BP Location: Right arm, Patient Position: Sitting, Cuff Size: Adult Regular)   Pulse 66   Temp 97.7  F (36.5  C) (Tympanic)   Resp 20   Ht 1.651 m (5' 5\")   Wt 78 kg (172 lb)   SpO2 97%   BMI 28.62 kg/m    Body mass index is 28.62 kg/m .  Physical Exam   Constitutional: She is oriented to person, place, and time. She appears well-developed and well-nourished. No distress.   Neurological: She is alert and oriented to person, place, and time.   Psychiatric: She " has a normal mood and affect.       Other exam not repeated.  Diagnostic Test Results:  none     ASSESSMENT/PLAN:     Partial idiopathic epilepsy with seizures of localized onset, not intractable, with status epilepticus (H)  Refilled gabapentin as written.  - gabapentin (NEURONTIN) 600 MG tablet; Take 1 tablet (600 mg) by mouth 3 times daily            Sam Tanner MD  [facility] [place]

## 2019-02-05 NOTE — NURSING NOTE
"Chief Complaint   Patient presents with     Chronic Disease Management       Initial /62 (BP Location: Right arm, Patient Position: Sitting, Cuff Size: Adult Regular)   Pulse 66   Temp 97.7  F (36.5  C) (Tympanic)   Resp 20   Ht 1.651 m (5' 5\")   Wt 78 kg (172 lb)   SpO2 97%   BMI 28.62 kg/m   Estimated body mass index is 28.62 kg/m  as calculated from the following:    Height as of this encounter: 1.651 m (5' 5\").    Weight as of this encounter: 78 kg (172 lb).  Medication Reconciliation: complete    Bridget Cormier LPN  "

## 2019-02-13 ENCOUNTER — TRANSFERRED RECORDS (OUTPATIENT)
Dept: HEALTH INFORMATION MANAGEMENT | Facility: CLINIC | Age: 64
End: 2019-02-13

## 2019-02-13 LAB — COLOGUARD-ABSTRACT: NEGATIVE

## 2019-02-25 ENCOUNTER — TELEPHONE (OUTPATIENT)
Dept: FAMILY MEDICINE | Facility: OTHER | Age: 64
End: 2019-02-25

## 2019-02-25 NOTE — TELEPHONE ENCOUNTER
Attempted to call patient with negative cologuard result. Left message for patient to return call.

## 2019-03-01 DIAGNOSIS — G47.00 PERSISTENT INSOMNIA: ICD-10-CM

## 2019-03-04 RX ORDER — ZOLPIDEM TARTRATE 10 MG/1
TABLET ORAL
Qty: 30 TABLET | Refills: 0 | Status: SHIPPED | OUTPATIENT
Start: 2019-03-04 | End: 2019-03-29

## 2019-03-29 DIAGNOSIS — G47.00 PERSISTENT INSOMNIA: ICD-10-CM

## 2019-03-29 RX ORDER — ZOLPIDEM TARTRATE 10 MG/1
TABLET ORAL
Qty: 30 TABLET | Refills: 0 | Status: SHIPPED | OUTPATIENT
Start: 2019-03-29 | End: 2019-04-16

## 2019-04-16 DIAGNOSIS — G47.00 PERSISTENT INSOMNIA: ICD-10-CM

## 2019-04-16 RX ORDER — ZOLPIDEM TARTRATE 10 MG/1
TABLET ORAL
Qty: 30 TABLET | Refills: 0 | Status: SHIPPED | OUTPATIENT
Start: 2019-04-16

## 2019-04-16 NOTE — TELEPHONE ENCOUNTER
zolpidem (AMBIEN) 10 MG tablet  Last Written Prescription Date:  3/29/19  Last Fill Quantity: 30,   # refills: 0  Last Office Visit: 2/5/19  Future Office visit:

## 2019-04-17 DIAGNOSIS — E78.5 HYPERLIPIDEMIA LDL GOAL <160: ICD-10-CM

## 2019-04-17 DIAGNOSIS — F32.A DEPRESSION, UNSPECIFIED DEPRESSION TYPE: ICD-10-CM

## 2019-04-17 NOTE — TELEPHONE ENCOUNTER
Celexa       Last Written Prescription Date:  12/10/2018  Last Fill Quantity: 90,   # refills: 1  Last Office Visit: 2/5/2019  Future Office visit:       Zocor       Last Written Prescription Date:  1/3/2019  Last Fill Quantity: 90,   # refills: 1  Last Office Visit: 2/5/2019  Future Office visit:

## 2019-04-18 RX ORDER — SIMVASTATIN 40 MG
40 TABLET ORAL AT BEDTIME
Qty: 90 TABLET | Refills: 0 | Status: SHIPPED | OUTPATIENT
Start: 2019-04-18 | End: 2019-10-10

## 2019-04-18 RX ORDER — CITALOPRAM HYDROBROMIDE 20 MG/1
20 TABLET ORAL DAILY
Qty: 90 TABLET | Refills: 1 | Status: SHIPPED | OUTPATIENT
Start: 2019-04-18

## 2019-07-16 ENCOUNTER — TELEPHONE (OUTPATIENT)
Dept: FAMILY MEDICINE | Facility: OTHER | Age: 64
End: 2019-07-16

## 2019-07-16 NOTE — TELEPHONE ENCOUNTER
Pt calls for Ambien refill  Has relocated to [place] and has established care with new provider  Suggested she call new primary for refill    Misti Khan

## 2019-10-08 DIAGNOSIS — E78.5 HYPERLIPIDEMIA LDL GOAL <160: ICD-10-CM

## 2019-10-08 NOTE — TELEPHONE ENCOUNTER
simvastatin (ZOCOR) 40 MG tablet  Last Written Prescription Date:  4/18/19  Last Fill Quantity: 90,   # refills: 0  Last Office Visit: 2/5/19  Future Office visit:

## 2019-10-10 RX ORDER — SIMVASTATIN 40 MG
40 TABLET ORAL AT BEDTIME
Qty: 90 TABLET | Refills: 0 | Status: SHIPPED | OUTPATIENT
Start: 2019-10-10

## 2019-11-18 DIAGNOSIS — F03.90 SENILE DEMENTIA (H): ICD-10-CM

## 2019-11-18 DIAGNOSIS — I69.319 COGNITIVE DEFICIT FOLLOWING CEREBROVASCULAR ACCIDENT (CVA): Primary | ICD-10-CM

## 2019-11-18 NOTE — TELEPHONE ENCOUNTER
donepezil      Last Written Prescription Date:  11/20/18  Last Fill Quantity: 90,   # refills: 2  Last Office Visit: 2/5/19  Future Office visit:       Routing refill request to provider for review/approval because

## 2019-11-20 RX ORDER — DONEPEZIL HYDROCHLORIDE 10 MG/1
10 TABLET, FILM COATED ORAL AT BEDTIME
Qty: 90 TABLET | Refills: 2 | Status: SHIPPED | OUTPATIENT
Start: 2019-11-20 | End: 2020-08-27

## 2020-01-03 DIAGNOSIS — F32.A DEPRESSION, UNSPECIFIED DEPRESSION TYPE: Primary | ICD-10-CM

## 2020-01-03 RX ORDER — CITALOPRAM HYDROBROMIDE 20 MG/1
20 TABLET ORAL DAILY
Qty: 90 TABLET | Refills: 1 | Status: SHIPPED | OUTPATIENT
Start: 2020-01-03

## 2020-01-03 RX ORDER — CITALOPRAM HYDROBROMIDE 20 MG/1
20 TABLET ORAL DAILY
Status: CANCELLED | OUTPATIENT
Start: 2020-01-03

## 2020-01-03 NOTE — TELEPHONE ENCOUNTER
Citalopram (Celexa) 20 mg tablet.  Take 1 tablet (20mg) by mouth daily.      Last Written Prescription Date:  4-18-19  Last Fill Quantity: 90,   # refills: 1  Last Office Visit: 2-5-19  Future Office visit:       Send to [facility], [place], [place]

## 2020-08-27 DIAGNOSIS — I69.319 COGNITIVE DEFICIT FOLLOWING CEREBROVASCULAR ACCIDENT (CVA): ICD-10-CM

## 2020-08-27 RX ORDER — DONEPEZIL HYDROCHLORIDE 10 MG/1
10 TABLET, FILM COATED ORAL AT BEDTIME
Qty: 90 TABLET | Refills: 2 | Status: SHIPPED | OUTPATIENT
Start: 2020-08-27

## 2020-08-27 NOTE — TELEPHONE ENCOUNTER
aricept      Last Written Prescription Date:  11/20/19  Last Fill Quantity: 90,   # refills: 2  Last Office Visit: 2/5/19  Future Office visit:       Routing refill request to provider for review/approval because:  Drug not on the [facility], P or [facility] refill protocol or controlled substance

## 2021-06-05 ENCOUNTER — APPOINTMENT (OUTPATIENT)
Dept: CT IMAGING | Facility: HOSPITAL | Age: 66
End: 2021-06-05
Attending: EMERGENCY MEDICINE
Payer: MEDICARE

## 2021-06-05 ENCOUNTER — HOSPITAL ENCOUNTER (EMERGENCY)
Facility: HOSPITAL | Age: 66
Discharge: HOME | End: 2021-06-05
Attending: EMERGENCY MEDICINE | Admitting: EMERGENCY MEDICINE
Payer: MEDICARE

## 2021-06-05 VITALS
SYSTOLIC BLOOD PRESSURE: 125 MMHG | HEART RATE: 52 BPM | RESPIRATION RATE: 18 BRPM | OXYGEN SATURATION: 95 % | DIASTOLIC BLOOD PRESSURE: 77 MMHG | TEMPERATURE: 98.9 F

## 2021-06-05 DIAGNOSIS — R29.818 ACUTE FOCAL NEUROLOGICAL DEFICIT: ICD-10-CM

## 2021-06-05 DIAGNOSIS — G40.909 SEIZURE DISORDER (H): ICD-10-CM

## 2021-06-05 LAB
ALBUMIN UR-MCNC: NEGATIVE MG/DL
ANION GAP SERPL CALCULATED.3IONS-SCNC: 8 MMOL/L (ref 3–14)
APPEARANCE UR: CLEAR
APTT PPP: 32 SEC (ref 22–37)
BACTERIA #/AREA URNS HPF: ABNORMAL /HPF
BASOPHILS # BLD AUTO: 0 10E9/L (ref 0–0.2)
BASOPHILS NFR BLD AUTO: 0.5 %
BILIRUB UR QL STRIP: NEGATIVE
BUN SERPL-MCNC: 13 MG/DL (ref 7–30)
CALCIUM SERPL-MCNC: 8.3 MG/DL (ref 8.5–10.1)
CHLORIDE SERPL-SCNC: 102 MMOL/L (ref 94–109)
CO2 SERPL-SCNC: 25 MMOL/L (ref 20–32)
COLOR UR AUTO: ABNORMAL
CREAT SERPL-MCNC: 0.65 MG/DL (ref 0.52–1.04)
DIFFERENTIAL METHOD BLD: ABNORMAL
EOSINOPHIL # BLD AUTO: 0.1 10E9/L (ref 0–0.7)
EOSINOPHIL NFR BLD AUTO: 0.8 %
ERYTHROCYTE [DISTWIDTH] IN BLOOD BY AUTOMATED COUNT: 12.4 % (ref 10–15)
GFR SERPL CREATININE-BSD FRML MDRD: >90 ML/MIN/{1.73_M2}
GLUCOSE SERPL-MCNC: 100 MG/DL (ref 70–99)
GLUCOSE UR STRIP-MCNC: NEGATIVE MG/DL
HCT VFR BLD AUTO: 34.1 % (ref 35–47)
HGB BLD-MCNC: 11.5 G/DL (ref 11.7–15.7)
HGB UR QL STRIP: NEGATIVE
IMM GRANULOCYTES # BLD: 0 10E9/L (ref 0–0.4)
IMM GRANULOCYTES NFR BLD: 0.3 %
INR PPP: 1.11 (ref 0.86–1.14)
KETONES UR STRIP-MCNC: NEGATIVE MG/DL
LEUKOCYTE ESTERASE UR QL STRIP: ABNORMAL
LYMPHOCYTES # BLD AUTO: 2.4 10E9/L (ref 0.8–5.3)
LYMPHOCYTES NFR BLD AUTO: 27.6 %
MCH RBC QN AUTO: 31.2 PG (ref 26.5–33)
MCHC RBC AUTO-ENTMCNC: 33.7 G/DL (ref 31.5–36.5)
MCV RBC AUTO: 92 FL (ref 78–100)
MONOCYTES # BLD AUTO: 0.8 10E9/L (ref 0–1.3)
MONOCYTES NFR BLD AUTO: 9.2 %
MUCOUS THREADS #/AREA URNS LPF: PRESENT /LPF
NEUTROPHILS # BLD AUTO: 5.4 10E9/L (ref 1.6–8.3)
NEUTROPHILS NFR BLD AUTO: 61.6 %
NITRATE UR QL: NEGATIVE
NRBC # BLD AUTO: 0 10*3/UL
NRBC BLD AUTO-RTO: 0 /100
PH UR STRIP: 5.5 PH (ref 4.7–8)
PLATELET # BLD AUTO: 219 10E9/L (ref 150–450)
POTASSIUM SERPL-SCNC: 3.8 MMOL/L (ref 3.4–5.3)
RBC # BLD AUTO: 3.69 10E12/L (ref 3.8–5.2)
RBC #/AREA URNS AUTO: 0 /HPF (ref 0–2)
SODIUM SERPL-SCNC: 135 MMOL/L (ref 133–144)
SOURCE: ABNORMAL
SP GR UR STRIP: 1.03 (ref 1–1.03)
SQUAMOUS #/AREA URNS AUTO: 1 /HPF (ref 0–1)
TROPONIN I SERPL-MCNC: <0.015 UG/L (ref 0–0.04)
UROBILINOGEN UR STRIP-MCNC: NORMAL MG/DL (ref 0–2)
WBC # BLD AUTO: 8.8 10E9/L (ref 4–11)
WBC #/AREA URNS AUTO: 3 /HPF (ref 0–5)

## 2021-06-05 PROCEDURE — 70450 CT HEAD/BRAIN W/O DYE: CPT | Mod: MG,XS

## 2021-06-05 PROCEDURE — 93005 ELECTROCARDIOGRAM TRACING: CPT

## 2021-06-05 PROCEDURE — 93010 ELECTROCARDIOGRAM REPORT: CPT | Performed by: INTERNAL MEDICINE

## 2021-06-05 PROCEDURE — 85730 THROMBOPLASTIN TIME PARTIAL: CPT | Performed by: EMERGENCY MEDICINE

## 2021-06-05 PROCEDURE — 250N000011 HC RX IP 250 OP 636: Performed by: EMERGENCY MEDICINE

## 2021-06-05 PROCEDURE — 85025 COMPLETE CBC W/AUTO DIFF WBC: CPT | Performed by: EMERGENCY MEDICINE

## 2021-06-05 PROCEDURE — 81001 URINALYSIS AUTO W/SCOPE: CPT | Performed by: EMERGENCY MEDICINE

## 2021-06-05 PROCEDURE — 36415 COLL VENOUS BLD VENIPUNCTURE: CPT | Performed by: EMERGENCY MEDICINE

## 2021-06-05 PROCEDURE — 84484 ASSAY OF TROPONIN QUANT: CPT | Performed by: EMERGENCY MEDICINE

## 2021-06-05 PROCEDURE — 99285 EMERGENCY DEPT VISIT HI MDM: CPT | Mod: 25

## 2021-06-05 PROCEDURE — 85610 PROTHROMBIN TIME: CPT | Performed by: EMERGENCY MEDICINE

## 2021-06-05 PROCEDURE — 99291 CRITICAL CARE FIRST HOUR: CPT | Performed by: EMERGENCY MEDICINE

## 2021-06-05 PROCEDURE — 70498 CT ANGIOGRAPHY NECK: CPT | Mod: MG

## 2021-06-05 PROCEDURE — 80048 BASIC METABOLIC PNL TOTAL CA: CPT | Performed by: EMERGENCY MEDICINE

## 2021-06-05 RX ORDER — IOPAMIDOL 755 MG/ML
50 INJECTION, SOLUTION INTRAVASCULAR ONCE
Status: COMPLETED | OUTPATIENT
Start: 2021-06-05 | End: 2021-06-05

## 2021-06-05 RX ADMIN — IOPAMIDOL 50 ML: 755 INJECTION, SOLUTION INTRAVENOUS at 18:04

## 2021-06-05 NOTE — ED NOTES
Code stroke activated by [facility] PTA. LKW 1700. According to EMS, at 1700 patient became confused and not acting appropriately per family. On arrival, pt now following commands. Neuro exam performed by Dr. Cardona en route to CT. Pt directly to CT from EMS cart.

## 2021-06-05 NOTE — ED NOTES
STROKE CODE ARRIVAL NOTE    65 year old Female that presents to triage ambulance  With history of altered mental status, not responding appropriately verbally, and right sided facial droop reported by caregiver  Last known well 1700   Tier 1 Stroke Code activated   Point of Care glucose 103  See Neurological narrator for initial and ongoing 15 minute neurological checks.  Action taken directly to CT from EMS cart and neuro assessment performed by Dr. Cardona.

## 2021-06-05 NOTE — ED PROVIDER NOTES
History     Chief Complaint   Patient presents with     Neurologic Problem     HPI  Mary Pemberton is a 65 year old female who is to the ED via EMS for evaluation of possible stroke.  Per paramedics, and subsequently corroborated by the patient's , she was last normal at around 5 PM today.  Her  states that she began to have some garbled speech which happens somewhat frequently due to an underlying seizure disorder.  What was somewhat atypical today was the development of some drooling and left-sided facial droop and she also potentially became discoordinated with her right upper extremity and dropped a cup.  She had no complaints of headache prior to this, and is otherwise been in her pretty normal state of health the past several weeks.  She has not missed any doses of her medications recently.  She is followed by a stroke and seizure specialist through the [facility] system in the [facility].   does not report any recent fevers, vomiting or diarrhea.  No recent head trauma or falls.  She is not anticoagulated.     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  It is believed that her seizure disorder is a resultant effect of long-term sequela of the radiation therapy.  She is on antiepileptic medications, and has had some dosage changes within the past month or so but has been stable since those changes.  Her Leviteriacetam was increased 2 weeks ago from 1500mg every AM and 1000mg every PM to 1500mg BID. She has not missed any new doses of this medication.    The patient herself is not able to provide usable history or exam as she has mumbling sort of garbled speech that is nonsensical, and has difficulty following commands on arrival to the ED.    Per discharge summary from [facility] for admission in Feb 2020: history of metastatic melanoma with mets to brain (s/p resection and  radiation to brain), seizures, CVA, afib, hyperlipidemia, and GERD who was admitted 2/27/20 with confused speech and abnormal movements of hands and face; has these symptoms intermittently but these symptoms lasted longer than prior. At OSH, her labs were unremarkable, CT brain with no acute abnormalities, she was transferred to [facility] for further workup. MRI also without acute change, noted postop L frontal carniectomy, multiple foci of magnetic susceptibility artifact consistent with either treated mets, microhemorrhage or cavernomas. She was monitored on EEG without evidence of ongoing seizures; [facility] thought episode confusion and speech changes likely 2/2 breakthrough seizure for which she takes gabapentin though there is some question of whether she is appropriately consistent with dosing. Her dose of gabapentin was increased from 600mg TID to [phone]. Recommend establish care with outpatient neurologist in [place], contact information given. Her oxybutynin and zolpidem were held during admission, could consider discontinuing or reducing dose if ongoing issues. She was discharged home 2/29/2020 with referrals for physical therapy, occupational therapy, and neurology.      Allergies:  Allergies   Allergen Reactions     Codeine Nausea and Vomiting     Codeine Phosphate      Quinolones      Pt intolerance to steroids also     Tamiflu [Oseltamivir] Other (See Comments) and GI Disturbance     Stomach ache and headache        Problem List:    Patient Active Problem List    Diagnosis Date Noted     Health Care Home 07/24/2018     Priority: Medium     Seizure disorder (H) 02/25/2018     Priority: Medium     Obesity 10/10/2017     Priority: Medium     Partial idiopathic epilepsy with seizures of localized onset, not intractable, with status epilepticus (H) 10/10/2017     Priority: Medium     Superficial siderosis of central nervous system 03/28/2017     Priority: Medium     TIA (transient ischemic  attack) 03/17/2017     Priority: Medium     ACP (advance care planning) 09/16/2016     Priority: Medium     Advance Care Planning 9/16/2016: ACP Review of Chart / Resources Provided:  Reviewed chart for advance care plan.  Mary Pemberton has no plan or code status on file. Discussed available resources and provided with information. Confirmed code status reflects current choices pending further ACP discussions.  Confirmed/documented legally designated decision makers.  Added by Jennie Bazzi             OAB (overactive bladder) 06/03/2016     Priority: Medium     Cognitive deficit following cerebrovascular accident (CVA) 06/03/2016     Priority: Medium     Prediabetes 01/21/2016     Priority: Medium     Nonspecific abnormal electrocardiogram (ECG) (EKG) 05/06/2015     Priority: Medium     Dyslipidemia 09/08/2014     Priority: Medium     Major depressive disorder, recurrent episode, mild (H) 09/08/2014     Priority: Medium     GERD (gastroesophageal reflux disease) 09/08/2014     Priority: Medium     Malignant melanoma, metastatic (H) 07/03/2013     Priority: Medium        Past Medical History:    Past Medical History:   Diagnosis Date     Atrial fibrillation (H)      CAD (coronary atherosclerotic disease) 11/14/2012     CVA (cerebral infarction) 04/28/2015     Dementia 10/19/2012     Depressive disorder, not elsewhere classified 4/22/2008     GERD (gastroesophageal reflux disease) 11/14/2012     H/O: stroke 4/28/2015     Localized superficial swelling, mass, or lump 9/9/2004     Lump or mass in breast 1/3/2000     Malignant melanoma of skin of upper limb, including shoulder (H) 9/4/2001     Melanoma of skin (H) 4/16/2002     Melanoma of skin, site unspecified 4/16/2002     Other and unspecified hyperlipidemia 9/12/2002     Other specified episodic mood disorder 8/16/2002     Pre-diabetes 9/13/2011     Rash and other nonspecific skin eruption 11/7/2005       Past Surgical History:    Past Surgical History:   Procedure  Laterality Date     arm      LT     brain bx       COLONOSCOPY  2008     open heart         Family History:    Family History   Problem Relation Age of Onset     Cancer Father         lung     Other Cancer Father         lung     Hypertension Mother      Coronary Artery Disease Mother      Cancer Maternal Grandfather      Other Cancer Maternal Grandfather      Coronary Artery Disease Paternal Uncle      Diabetes No family hx of      Hyperlipidemia No family hx of      Cerebrovascular Disease No family hx of      Breast Cancer No family hx of      Colon Cancer No family hx of      Prostate Cancer No family hx of      Thyroid Disease No family hx of      Genetic Disorder No family hx of      Anesthesia Reaction No family hx of      Substance Abuse No family hx of        Social History:  Marital Status:   [2]  Social History     Tobacco Use     Smoking status: Former Smoker     Packs/day: 1.00     Years: 20.00     Pack years: 20.00     Types: Cigarettes     Start date: 1969     Quit date: 2002     Years since quittin.0     Smokeless tobacco: Never Used     Tobacco comment: year quit ; no passive exposure.   Substance Use Topics     Alcohol use: No     Drug use: No        Medications:    citalopram (CELEXA) 20 MG tablet  pantoprazole (PROTONIX) 40 MG EC tablet  Ascorbic Acid (VITAMIN C PO)  aspirin (ASPIRIN ADULT LOW STRENGTH) 81 MG chewable tablet  citalopram (CELEXA) 20 MG tablet  cyanocolbalamin (VITAMIN  B-12) 100 MCG tablet  donepezil (ARICEPT) 10 MG tablet  erythromycin (ROMYCIN) ophthalmic ointment  gabapentin (NEURONTIN) 600 MG tablet  gentamicin (GARAMYCIN) 0.3 % ophthalmic ointment  HYDROcodone-acetaminophen (NORCO) 5-325 MG per tablet  ibuprofen (ADVIL/MOTRIN) 600 MG tablet  Omega-3 Fatty Acids (OMEGA-3 FISH OIL PO)  oxybutynin (DITROPAN) 5 MG tablet  Pantoprazole Sodium (PROTONIX PO)  simvastatin (ZOCOR) 40 MG tablet  triamcinolone (KENALOG) 0.1 % cream  zolpidem (AMBIEN) 10 MG  tablet          Review of Systems  Initial review of systems is limited secondary to the patient's presenting clinical condition.. Following resolution of the initial presenting neurologic deficits, she is able to fully provide a review of systems.  Pertinent positives and negatives noted in the HPI, remainder of 10 point review systems is otherwise negative    Physical Exam   BP: 142/90  Pulse: 56  Temp: 98.9  F (37.2  C)  Resp: 16  SpO2: 99 %      Physical Exam  Vitals signs and nursing note reviewed.   HENT:      Head: Normocephalic.      Right Ear: External ear normal.      Left Ear: External ear normal.      Nose: Nose normal. No congestion or rhinorrhea.      Mouth/Throat:      Mouth: Mucous membranes are moist.      Pharynx: Oropharynx is clear. No oropharyngeal exudate or posterior oropharyngeal erythema.   Eyes:      General: No scleral icterus.     Extraocular Movements: Extraocular movements intact.      Conjunctiva/sclera: Conjunctivae normal.      Pupils: Pupils are equal, round, and reactive to light.   Neck:      Musculoskeletal: Normal range of motion.   Cardiovascular:      Rate and Rhythm: Normal rate.      Pulses: Normal pulses.      Heart sounds: Normal heart sounds. No murmur. No friction rub. No gallop.    Pulmonary:      Effort: Pulmonary effort is normal. No respiratory distress.      Breath sounds: Normal breath sounds. No wheezing or rales.   Abdominal:      General: Abdomen is flat. Bowel sounds are normal.      Palpations: Abdomen is soft.      Tenderness: There is no abdominal tenderness.   Musculoskeletal:         General: No swelling, tenderness or signs of injury.   Skin:     General: Skin is warm and dry.      Capillary Refill: Capillary refill takes less than 2 seconds.      Coloration: Skin is not jaundiced or pale.   Neurological:      Mental Status: She is alert. She is confused.      GCS: GCS eye subscore is 4. GCS verbal subscore is 5. GCS motor subscore is 5.      Cranial  Nerves: Dysarthria present. No facial asymmetry.      Motor: No weakness, abnormal muscle tone, seizure activity or pronator drift.      Comments: Patient presents with some garbled/muddy/unintelligible speech.  Somewhat difficult to determine if this is due to dysarthria versus aphasia.  She has symmetric hand grasps to command (right maybe slightly weaker than left) but has difficulty following commands when asked to move the lower extremities.  She spontaneously moves both lower extremities with symmetric range of motion and strength.  No obvious facial droop on arrival to the ED. in the midline.  NIH stroke scale as documented elsewhere in his chart   Psychiatric:      Comments: Unable to assess due to the patient's altered mental status/acute neurologic status         ED Course        Procedures               EKG Interpretation:      Interpreted by Clarence Cardona MD  Time reviewed: 18:18  Symptoms at time of EKG: suspected stroke   Rhythm: normal sinus   Rate: normal  Axis: normal  Ectopy: none  Conduction: normal  ST Segments/ T Waves: No ST-T wave changes  Q Waves: none  Comparison to prior: No old EKG available    Clinical Impression: normal EKG          Critical Care time:  was 45 minutes for this patient excluding procedures.    The patient has stroke symptoms:         ED Stroke specific documentation           NIHSS PDF     Patient last known well time: 1700  ED Provider first to bedside at: on arrival to ED  CT Results received at: 18:37    Thrombolytics:   Not given due to minor/isolated/quickly resolving symptoms.    If treating with thrombolytics: Ensure SBP<180 and DBP<105 prior to treatment with thrombolytics.  Administering thrombolytics after treatment with IV labetalol, hydralazine, or nicardipine is reasonable once BP control is established.    Endovascular Retrieval:  Not initiated due to absence of proximal vessel occlusion    National Institutes of Health Stroke Scale (Baseline)  Time  Performed: 17:50     Score    Level of consciousness: (0)   Alert, keenly responsive    LOC questions: (2)   Answers neither question correctly    LOC commands: (1)   Performs one task correctly    Best gaze: (0)   Normal    Visual: (0)   No visual loss    Facial palsy: (0)   Normal symmetrical movements    Motor arm (left): (0)   No drift    Motor arm (right): (0)   No drift    Motor leg (left): (0)   No drift    Motor leg (right): (0)   No drift    Limb ataxia: (0)   Absent    Sensory: (0)   Normal- no sensory loss    Best language: (1)   Mild to moderate aphasia    Dysarthria: (1)   Mild to moderate dysarthria    Extinction and inattention: (0)   No abnormality        Total Score:  5        Stroke Mimics were considered (including migraine headache, seizure disorder, hypoglycemia (or hyperglycemia), head or spinal trauma, CNS infection, Toxin ingestion and shock state (e.g. sepsis) .           National Institutes of Health Stroke Scale  Time Performed: 18:30      Score    Level of consciousness: (0)   Alert, keenly responsive    LOC questions: (0)   Answers both questions correctly    LOC commands: (0)   Performs both tasks correctly    Best gaze: (0)   Normal    Visual: (0)   No visual loss    Facial palsy: (0)   Normal symmetrical movements    Motor arm (left): (0)   No drift    Motor arm (right): (0)   No drift    Motor leg (left): (0)   No drift    Motor leg (right): (0)   No drift    Limb ataxia: (0)   Absent    Sensory: (0)   Normal- no sensory loss    Best language: (0)   Normal- no aphasia    Dysarthria: (0)   Normal    Extinction and inattention: (0)   No abnormality        Total Score:  0               Results for orders placed or performed during the hospital encounter of 06/05/21 (from the past 24 hour(s))   CT Head w/o Contrast    Narrative    PROCEDURE: CT HEAD W/O CONTRAST     HISTORY: Code Stroke to evaluate for potential thrombolysis and  thrombectomy.  PLEASE READ IMMEDIATELY..    COMPARISON:  2018    TECHNIQUE:  Helical images of the head from the foramen magnum to the  vertex were obtained without contrast.    FINDINGS: Postoperative changes are seen in the left frontal bone. The  ventricular system is normal in size. This white matter low-density in  both hemispheres consistent with small vessel disease.  The visualized  paranasal sinuses are clear.      Impression    IMPRESSION: No acute brain abnormality.  was called with the  report at 6:38 PM      ANNE PARMAR MD   CBC with Platelets & Differential   Result Value Ref Range    WBC 8.8 4.0 - 11.0 10e9/L    RBC Count 3.69 (L) 3.8 - 5.2 10e12/L    Hemoglobin 11.5 (L) 11.7 - 15.7 g/dL    Hematocrit 34.1 (L) 35.0 - 47.0 %    MCV 92 78 - 100 fl    MCH 31.2 26.5 - 33.0 pg    MCHC 33.7 31.5 - 36.5 g/dL    RDW 12.4 10.0 - 15.0 %    Platelet Count 219 150 - 450 10e9/L    Diff Method Automated Method     % Neutrophils 61.6 %    % Lymphocytes 27.6 %    % Monocytes 9.2 %    % Eosinophils 0.8 %    % Basophils 0.5 %    % Immature Granulocytes 0.3 %    Nucleated RBCs 0 0 /100    Absolute Neutrophil 5.4 1.6 - 8.3 10e9/L    Absolute Lymphocytes 2.4 0.8 - 5.3 10e9/L    Absolute Monocytes 0.8 0.0 - 1.3 10e9/L    Absolute Eosinophils 0.1 0.0 - 0.7 10e9/L    Absolute Basophils 0.0 0.0 - 0.2 10e9/L    Abs Immature Granulocytes 0.0 0 - 0.4 10e9/L    Absolute Nucleated RBC 0.0    Basic metabolic panel   Result Value Ref Range    Sodium 135 133 - 144 mmol/L    Potassium 3.8 3.4 - 5.3 mmol/L    Chloride 102 94 - 109 mmol/L    Carbon Dioxide 25 20 - 32 mmol/L    Anion Gap 8 3 - 14 mmol/L    Glucose 100 (H) 70 - 99 mg/dL    Urea Nitrogen 13 7 - 30 mg/dL    Creatinine 0.65 0.52 - 1.04 mg/dL    GFR Estimate >90 >60 mL/min/[1.73_m2]    GFR Estimate If Black >90 >60 mL/min/[1.73_m2]    Calcium 8.3 (L) 8.5 - 10.1 mg/dL   Troponin I   Result Value Ref Range    Troponin I ES <0.015 0.000 - 0.045 ug/L   UA with Microscopic reflex to Culture    Specimen: Midstream Urine    Result Value Ref Range    Color Urine Light Yellow     Appearance Urine Clear     Glucose Urine Negative NEG^Negative mg/dL    Bilirubin Urine Negative NEG^Negative    Ketones Urine Negative NEG^Negative mg/dL    Specific Gravity Urine 1.035 1.003 - 1.035    Blood Urine Negative NEG^Negative    pH Urine 5.5 4.7 - 8.0 pH    Protein Albumin Urine Negative NEG^Negative mg/dL    Urobilinogen mg/dL Normal 0.0 - 2.0 mg/dL    Nitrite Urine Negative NEG^Negative    Leukocyte Esterase Urine Trace (A) NEG^Negative    Source Midstream Urine     WBC Urine 3 0 - 5 /HPF    RBC Urine 0 0 - 2 /HPF    Bacteria Urine None (A) NEG^Negative /HPF    Squamous Epithelial /HPF Urine 1 0 - 1 /HPF    Mucous Urine Present (A) NEG^Negative /LPF       Medications   iopamidol (ISOVUE-370) solution 50 mL (50 mLs Intravenous Given 6/5/21 1804)   sodium chloride (PF) 0.9% PF flush 100 mL (100 mLs Intravenous Given 6/5/21 1804)       Assessments & Plan (with Medical Decision Making)     I have reviewed the nursing notes.    I have reviewed the findings, diagnosis, plan and need for follow up with the patient.  Patient presents with the above history and exam concerning for possible stroke versus neurologic findings secondary to seizure.    Diagnostic evaluation is outlined as above.  Neuroimaging studies do not show evidence of acute ischemic event or hemorrhagic event.  No evidence of any new intracranial masses.  Laboratory studies show values within acceptable limits.  EKG shows no evidence of ischemia.  Urinalysis shows no evidence of UTI.    Case is discussed with the stroke neurologist, and given the past history of melanoma, seizures, lack of acute ischemic event noted on CT scan, and rapid complete resolution of her neurologic findings, we determined she is not a TPA candidate.  We feel more likely that the patient's presentation is related to seizure rather than actual stroke.    Discussed results of diagnostic studies with the patient  and her  and performed a reassessment as noted above.  She has returned to her normal baseline mental status and has no focal neuro deficits at this point.  I do not have specific concern for subtherapeutic dosing of her levetiracetam and I do not think dosage adjustment is warranted immediately.  I do think she should follow-up with her neurologist regarding this event to determine if they wish to make any other adjustments to her medication regimen.    I do not identify evidence of any other acute life-threatening issues at this time to warrant additional diagnostic work-up in the ED, and I feel that she is safe to be discharged home to resume her usual course of care.  Patient and her  indicate agreement and understanding plan of care, she is discharged in stable condition with good prognosis    Critical Care Addendum    My initial assessment, based on my review of prehospital provider report, review of nursing observations, review of vital signs, focused history, physical exam, review of cardiac rhythm monitor, 12 lead ECG analysis, discussion with stroke neurologist and interpretation of multiple diagnostic studies , established that Mary Pemberton has altered mental status and focal neurologic abnormalities, which requires immediate intervention, and therefore she is critically ill.     After the initial assessment, the care team initiated multiple lab tests to provide stabilization care. Due to the critical nature of this patient, I reassessed nursing observations, vital signs, physical exam, review of cardiac rhythm monitor, mental status, neurologic status and respiratory status multiple times prior to her disposition.     Time also spent performing documentation, discussion with family to obtain medical information for decision making, reviewing test results and discussion with consultants.     Critical care time (excluding teaching time and procedures): 45 minutes.     New Prescriptions    No  medications on file       Final diagnoses:   Acute focal neurological deficit   Seizure disorder (H)       6/5/2021   [facility]     Clarence Cardona MD  06/05/21 3600

## 2021-06-05 NOTE — CONSULTS
"[facility]    Stroke Telephone Note    I was called by Clarence Cardona on 06/05/21 regarding patient Mary Pemberton. The patient is a 65 year old female with a documented history of metastatic melanoma with mets to brain (s/p left frontal craniotomy resection and radiation to brain who is documented to have numerous (over 20) punctate foci of magnetic susceptibility artifact scattered throughout the supratentorial parenchyma and to lesser extent in the cerebellum), seizures reportedly on gabapentin [phone], afib, hyperlipidemia -  Seen as a stroke alert for language difficulty and left facial droop.      Notably, she was admitted 2/27/20 with confused speech and abnormal movements of hands and face which was thought to be related to breakthrough seizures.    The patient has had a long standing melanoma history: Per chart review: \"In 10/2005, the patient developed metastatic disease to lymph nodes and subcutaneous tissues.  An MRI revealed multiple brain lesions.  At that time, the patient underwent whole brain radiation therapy under the direction of Dr. Beka Stearns and then on to see Dr. Sosa at the [facility] on 11/2005 and underwent high-dose interleukin-2 treatment.  Subsequently, in 04/2009, she was found to have a 1.2 cm enhancing lesion in the outer table of the left frontal skull.  She was referred to Dr. Rubio Jung of Neurosurgery and, subsequently, the patient underwent excisional biopsy of lesion of the left frontal skull on 04/27/2009.  The patient underwent removal of bone including the margin around the lesion and replaced with titanium.\"        Stroke Code Data (for stroke code without tele)  Stroke code activated 06/05/21   1754   Stroke provider first response  06/05/21   1755    06/05/21   1755   Last known normal 06/05/21   1700        Time of discovery   (or onset of symptoms) 06/05/21   1710   Head CT read by Stroke Neuro Dr/Provider 06/05/21   1807   Was " "stroke code de-escalated? Yes 06/05/21 1823  presence of contraindications for both intravenous and intra-arterial stroke treatments       Thrombolytic Treatment   Not given due to Intraaxial malignancy, numerous cerebral macro and microbleeds,  and history of intracranial hemorrhage.    Endovascular Treatment  Not initiated due to absence of proximal vessel occlusion    Impression  Rrecrudesence of symptoms Vs. ischemic stroke    Recommendations   MRI brain w/wo contrast. Resume PTA seizure management, consider general neurology evaluation.       My recommendations are based on the information provided over the phone by Mary Pemberton's in-person providers. They are not intended to replace the clinical judgment of her in-person providers. I was not requested to personally see or examine the patient at this time.    The Stroke Staff is Dr. Quach.    FERNANDO GONZALEZ MD  Vascular Neurology Fellow  To page me or covering stroke neurology team member, click here: [facility]   Choose \"On Call\" tab at top, then search dropdown box for \"Neurology Adult\", select location, press Enter, then look for stroke/neuro ICU/telestroke.         "

## 2021-06-05 NOTE — ED NOTES
Bed: ED10a  Expected date:   Expected time:   Means of arrival:   Comments:  [facility]

## 2023-11-09 DIAGNOSIS — Z12.11 COLON CANCER SCREENING: ICD-10-CM

## 2023-11-23 ENCOUNTER — LAB (OUTPATIENT)
Dept: FAMILY MEDICINE | Facility: CLINIC | Age: 68
End: 2023-11-23
Payer: MEDICARE

## 2023-11-23 DIAGNOSIS — Z12.11 COLON CANCER SCREENING: ICD-10-CM

## 2024-01-02 NOTE — MR AVS SNAPSHOT
After Visit Summary   7/24/2018    Mary Pemberton    MRN: [id number]           Patient Information     Date Of Birth          1955        Visit Information        Provider Department      7/24/2018 11:00 AM Rodriguez Smith MD [facility]        Today's Diagnoses     Skull lesion        Metastatic malignant melanoma (H)          Care Instructions    We would like to see you back in 1 month. Please come 30minute(s) prior for lab work.  You have been scheduled for CT Scans of your neck, chest, abdomen, and pelvis. You have been scheduled for a Brain MRI and a whole body bone scan. You will have labs done today and at your next appt.     When you are in need of a refill of your medications, please call your pharmacy and they will send us the request. If you have any questions please call [phone]            Follow-ups after your visit        Additional Services     DERMATOLOGY REFERRAL       Your provider has referred you to:  [facility]; skin check for malignant melanoma    Please be aware that coverage of these services is subject to the terms and limitations of your health insurance plan.  Call member services at your health plan with any benefit or coverage questions.      Please bring the following with you to your appointment:    (1) Any X-Rays, CTs or MRIs which have been performed.  Contact the facility where they were done to arrange for  prior to your scheduled appointment.    (2) List of current medications  (3) This referral request   (4) Any documents/labs given to you for this referral                  Your next 10 appointments already scheduled     Aug 15, 2018  9:00 AM CDT   (Arrive by 8:45 AM)   NM INJECTION with [id number]   [facility] ([facility] )    [address] [place] [id number] [phone]            Aug 15, 2018 12:00 PM CDT   (Arrive by 11:45 AM)   NM BONE SCAN WHOLE BODY with HINM1   [facility]      Patient Seen in: [facility]      History     Chief Complaint   Patient presents with    Difficulty Breathing     Stated Complaint: sob    Subjective:   HPI    This is a 69-year-old female with multiple medical problem including history of atrial fibrillation on Coumadin, CHF, COPD, status post TAVR, presents to the emergency room for evaluation of cough and shortness of breath, states she has been coughing over the last week or so, cough is productive of yellowish-green sputum.  States she is feeling worsening shortness of breath.  Stopped taking her Bumex because of cough was making her urinate more.  Feels her legs are swelling up.  States he gets very short of breath with minimal exertion.  Denies chest pain.  Denies fevers or chills but does have fatigue.  Denies headache or neck pain.  Denies abdominal pain.    Objective:   Past Medical History:   Diagnosis Date    Aortic stenosis     S/P TAVR    ASTHMA     Asthma     Back problem     CANCER     thyroid    Cancer (HCC)     thyroid     COPD (chronic obstructive pulmonary disease) (HCC)     DEPRESSION     Disorder of thyroid     Essential hypertension     Fibromyalgia     High blood pressure     High cholesterol     HYPERTENSION     HYPOTHYROIDISM     Muscle weakness     OBESITY     OSTEOARTHRITIS     Osteoarthritis     OTHER DISEASES     Fibromyalgia    OTHER DISEASES     Pulmonary emboli    OTHER DISEASES     Lymph edema    OTHER DISEASES     Pulmonary hypertension    Peripheral vascular disease (HCC)     Pulmonary embolism (HCC)     Shortness of breath     ON EXERTION    SLEEP APNEA     Sleep apnea     CPAP              Past Surgical History:   Procedure Laterality Date    ANESTH,SHOULDER REPLACEMENT Right 2014    hemiathroplasty    BACK SURGERY  2000    BACK SURGERY  2004    complete c-3 -7 ectomy    BIOPSY Left 07/20/2023    TEMPORAL ARTERY    CATH TRANSCATHETER AORTIC VALVE REPLACEMENT      COLONOSCOPY N/A 05/10/2018     ([facility] )    [address] [address] [phone]           Please bring a list of your medicines to the exam. (Include vitamins, minerals and over-the-counter drugs.) You should wear comfortable clothes. Leave your valuables at home. Please bring related prior results and films.  Tell your doctor:   If you are breastfeeding or may be pregnant.   If you have had a barium test within the past 48 hours. Barium may change the results of certain exams.   If you think you may need sedation (medicine to help you relax).  You may eat and drink as normal.  Please call your Imaging Department at your exam site with any questions.            Aug 15, 2018  2:00 PM CDT   (Arrive by 1:00 PM)   CT SOFT TISSUE NECK W CONTRAST with HICT1   HI CT SCAN ([facility] )    [address] [id number] [phone]           Please bring any scans or X-rays taken at other hospitals, if similar tests were done. Also bring a list of your medicines, including vitamins, minerals and over-the-counter drugs. It is safest to leave personal items at home.  Be sure to tell your doctor:   If you have any allergies.   If there s any chance you are pregnant.   If you are breastfeeding.    If you have diabetes as your medication may need to be adjusted for this exam.  You will have contrast for this exam. To prepare:   Do not eat or drink for 2 hours before your exam. If you need to take medicine, you may take it with small sips of water. (We may ask you to take liquid medicine as well.)   The day before your exam, drink extra fluids at least six 8-ounce glasses (unless your doctor tells you to restrict your fluids).  Patients over 70 or patients with diabetes or kidney problems:   If you haven t had a blood test (creatinine test) within the last 30 days, the Cardiologist/Radiologist may require you to get this test prior to your exam.  Please wear loose clothing, such as a sweat suit  Procedure: COLONOSCOPY, POSSIBLE BIOPSY, POSSIBLE POLYPECTOMY 77501;  Surgeon: Kendell Valentin MD;  Location: [facility], [facility]    COLONOSCOPY      D & C  2009    DILATION/CURETTAGE,DIAGNOSTIC      HIP REPLACEMENT SURGERY  2009    Rt hip    KNEE REPLACEMENT SURGERY      Both knees    OTHER SURGICAL HISTORY      Thyroid removal    OTHER SURGICAL HISTORY      LT foot spur removal    TOTAL HIP REPLACEMENT      TOTAL KNEE REPLACEMENT                  Social History     Socioeconomic History    Marital status: Single   Tobacco Use    Smoking status: Former     Packs/day: 0.50     Years: 30.00     Additional pack years: 0.00     Total pack years: 15.00     Types: Cigarettes     Quit date: 1991     Years since quittin.9    Smokeless tobacco: Never   Vaping Use    Vaping Use: Never used   Substance and Sexual Activity    Alcohol use: No    Drug use: No   Other Topics Concern    Caffeine Concern No    Exercise Yes    Seat Belt Yes    Special Diet Yes     Comment: low sodium    Stress Concern Yes    Weight Concern No              Review of Systems    Positive for stated complaint: sob  Other systems are as noted in HPI.  Constitutional and vital signs reviewed.      All other systems reviewed and negative except as noted above.    Physical Exam     ED Triage Vitals [24 1238]   BP (!) 164/68   Pulse (!) 121   Resp (!) 28   Temp 98.8 °F (37.1 °C)   Temp src Temporal   SpO2 96 %   O2 Device None (Room air)       Current:/90   Pulse (!) 128   Temp 98.8 °F (37.1 °C) (Temporal)   Resp 19   Ht 175.3 cm (5' 9\")   Wt 106.6 kg   LMP  (LMP Unknown)   SpO2 98%   BMI 34.70 kg/m²         Physical Exam    GENERAL: Patient is awake, alert  HEENT:s no scleral icterus.  Mucous membranes are moist, oropharynx is clear, uvula midline.  T  HEART: Irregular irregular  LUNGS: Decreased breath sounds, expiratory wheezing.  Bronchospastic cough.  Occasional crackles.  ABDOMEN: Soft,  nondistended,non tender  EXTREMITIES: Lateral pretibial edema.  No calf tenderness    ED Course     Labs Reviewed   COMP METABOLIC PANEL (14) - Abnormal; Notable for the following components:       Result Value    Glucose 134 (*)     Calculated Osmolality 299 (*)     Albumin 3.1 (*)     A/G Ratio 0.9 (*)     All other components within normal limits   PRO BETA NATRIURETIC PEPTIDE - Abnormal; Notable for the following components:    Pro-Beta Natriuretic Peptide 2,680 (*)     All other components within normal limits   PROTHROMBIN TIME (PT) - Abnormal; Notable for the following components:    PT 25.0 (*)     INR 2.24 (*)     All other components within normal limits   CBC W/ DIFFERENTIAL - Abnormal; Notable for the following components:    RBC 3.57 (*)     HGB 11.4 (*)     HCT 34.2 (*)     All other components within normal limits   TROPONIN I HIGH SENSITIVITY - Normal   SARS-COV-2/FLU A AND B/RSV BY PCR (GENEXPERT) - Normal    Narrative:     This test is intended for the qualitative detection and differentiation of SARS-CoV-2, influenza A, influenza B, and respiratory syncytial virus (RSV) viral RNA in nasopharyngeal or nares swabs from individuals suspected of respiratory viral infection consistent with COVID-19 by their healthcare provider. Signs and symptoms of respiratory viral infection due to SARS-CoV-2, influenza, and RSV can be similar.    Test performed using the Xpert Xpress SARS-CoV-2/FLU/RSV (real time RT-PCR)  assay on the GeneXpert instrument, [employer], [place] [address].   This test is being used under the Food and Drug Administration's Emergency Use Authorization.    The authorized Fact Sheet for Healthcare Providers for this assay is available upon request from the laboratory.   CBC WITH DIFFERENTIAL WITH PLATELET    Narrative:     The following orders were created for panel order CBC With Differential With Platelet.  Procedure                               Abnormality         Status                  or jogging clothes. Avoid snaps, zippers and other metal. We may ask you to undress and put on a hospital gown.  If you have any questions, please call the Imaging Department where you will have your exam.            Aug 15, 2018  2:30 PM CDT   (Arrive by 2:15 PM)   CT CHEST/ABDOMEN/PELVIS W CONTRAST with HICT1   HI CT SCAN ([facility] )    [address] [id number] [phone]           Please bring any scans or X-rays taken at other hospitals, if similar tests were done. Also bring a list of your medicines, including vitamins, minerals and over-the-counter drugs. It is safest to leave personal items at home.  Be sure to tell your doctor:   If you have any allergies.   If there s any chance you are pregnant.   If you are breastfeeding.  How to prepare:   Do not eat or drink for 2 hours before your exam. If you need to take medicine, you may take it with small sips of water. (We may ask you to take liquid medicine as well.)   Please wear loose clothing, such as a sweat suit or jogging clothes. Avoid snaps, zippers and other metal. We may ask you to undress and put on a hospital gown.  Please arrive 30 minutes early for your CT. Once in the department you might be asked to drink water 15-20 minutes prior to your exam.  If indicated you may be asked to drink an oral contrast in advance of your CT.  If this is the case, the imaging team will let you know or be in contact with you prior to your appointment  Patients over 70 or patients with diabetes or kidney problems:   If you haven t had a blood test (creatinine test) within the last 30 days, the Cardiologist/Radiologist may require you to get this test prior to your exam.  If you have diabetes:   Continue to take your metformin medication on the day of your exam  If you have any questions, please call the Imaging Department where you will have your exam.            Aug 15, 2018  3:00 PM CDT   (Arrive by 2:45 PM)   MR BRAIN W/O & W      ---------                               -----------         ------                     CBC W/ DIFFERENTIAL[[id number]]          Abnormal            Final result                 Please view results for these tests on the individual orders.   PROCALCITONIN   BLOOD CULTURE   BLOOD CULTURE     EKG    Rate, intervals and axes as noted on EKG Report.  Rate: 115  Rhythm: Atrial Fibrillation  Reading: Atrial fibrillation with rapid ventricular spots.                   A total of 35 minutes of critical care time (exclusive of billable procedures) was administered to manage the patient's respiratory instability due to her CHF/pneumonia/acute bronchospasm.  This involved direct patient intervention, complex decision making, and/or extensive discussions with the patient, family, and clinical staff.         MDM        Differential diagnosis before testing includes but not limited to pneumonia, pneumothorax, pulmonary edema, COVID, RSV, influenza, electrode abnormality, which is a medical condition that poses a threat to life/function    Past Medical History/comorbidities-atrial fibrillation, COPD, hypertension,    Radiographic images  I personally reviewed the radiographs and my individual interpretation shows chest x-ray no pneumothorax  I also reviewed the official reports that showed chest x-ray cardiomegaly with interstitial opacities suggestive of mild edema.  Minimal left base opacity represent atelectasis versus infiltrate.    Discussion of management (consult/physicians, social work, pharmacy,ect) discussed with cardiology Dr. Adkins and Dr. Blackman hospitalist    Medications Provided: Nebulizer treatments, ceftriaxone/azithromycin, Lasix, Solu-Medrol    Course of Events during Emergency Room Visit include upon arrival to emergency room patient tachypneic, wheezing, chest x-ray performed.  Was given nebulizer treatment and IV steroids.  CBC white count 10.2 hemoglobin 11.4 platelet 268.  INR 2.24.  BNP 2680.  Troponin  CONTRAST with HIMR1   HI MRI ([facility] )    [address] [address] [phone]           Take your medicines as usual, unless your doctor tells you not to. Bring a list of your current medicines to your exam (including vitamins, minerals and over-the-counter drugs).  You may or may not receive intravenous (IV) contrast for this exam pending the discretion of the Radiologist.  You do not need to do anything special to prepare.  The MRI machine uses a strong magnet. Please wear clothes without metal (snaps, zippers). A sweatsuit works well, or we may give you a hospital gown.  Please remove any body piercings and hair extensions before you arrive. You will also remove watches, jewelry, hairpins, wallets, dentures, partial dental plates and hearing aids. You may wear contact lenses, and you may be able to wear your rings. We have a safe place to keep your personal items, but it is safer to leave them at home.  **IMPORTANT** THE INSTRUCTIONS BELOW ARE ONLY FOR THOSE PATIENTS WHO HAVE BEEN PRESCRIBED SEDATION OR GENERAL ANESTHESIA DURING THEIR MRI PROCEDURE:  IF YOUR DOCTOR PRESCRIBED ORAL SEDATION (take medicine to help you relax during your exam):   You must get the medicine from your doctor (oral medication) before you arrive. Bring the medicine to the exam. Do not take it at home. You ll be told when to take it upon arriving for your exam.   Arrive one hour early. Bring someone who can take you home after the test. Your medicine will make you sleepy. After the exam, you may not drive, take a bus or take a taxi by yourself.  IF YOUR DOCTOR PRESCRIBED IV SEDATION:   Arrive one hour early. Bring someone who can take you home after the test. Your medicine will make you sleepy. After the exam, you may not drive, take a bus or take a taxi by yourself.   No eating 6 hours before your exam. You may have clear liquids up until 4 hours before your exam. (Clear liquids include water, clear  26.  Chemistry was unremarkable.  Patient was given IV Lasix and antibiotics.  Discussed with cardiology and hospitalist.  Patient she is feeling much better.  Will admit for further evaluation and treatment.  Patient agrees to plan.    Shared decision making was utilized           Disposition:    Admission  I have discussed with the patient the results of test, differential diagnosis, and treatment plan. They expressed clear understanding of these instructions and agrees to the plan provided.       Note to patient: The 21st Century Cures Act makes medical notes like these available to patients in the interest of transparency. However, this is a medical document intended as peer to peer communication. It is written in medical language and may contain abbreviations or verbiage that are unfamiliar. It may appear blunt or direct. Medical documents are intended to carry relevant information, facts as evident, and the clinical opinion of the practitioner.           Admission disposition: 1/2/2024  4:25 PM                                        Medical Decision Making      Disposition and Plan     Clinical Impression:  1. Community acquired pneumonia, unspecified laterality    2. Acute on chronic congestive heart failure, unspecified heart failure type (HCC)    3. Acute bronchospasm    4. Atrial fibrillation, chronic (HCC)         Disposition:  Admit  1/2/2024  4:25 pm    Follow-up:  No follow-up provider specified.        Medications Prescribed:  Current Discharge Medication List                            Hospital Problems       Present on Admission  Date Reviewed: 8/8/2023            ICD-10-CM Noted POA    * (Principal) Community acquired pneumonia, unspecified laterality J18.9 1/2/2024 Unknown                    tea, black coffee and fruit juice without pulp.)  IF YOUR DOCTOR PRESCRIBED ANESTHESIA (be asleep for your exam):   Arrive 1 1/2 hours early. Bring someone who can take you home after the test. You may not drive, take a bus or take a taxi by yourself.   No eating 8 hours before your exam. You may have clear liquids up until 4 hours before your exam. (Clear liquids include water, clear tea, black coffee and fruit juice without pulp.)   You will spend four to five hours in the recovery room.  Please call the Imaging Department at your exam site with any questions.            Aug 20, 2018 10:30 AM CDT   (Arrive by 10:15 AM)   Return Visit with Rodriguez Smith MD   [facility]    [address] [place] [address]   [phone]            Oct 05, 2018 10:40 AM CDT   (Arrive by 10:25 AM)   SHORT with Sam Tanner MD   [facility] ([facility] )    [address] [place] [address]   [phone]              Future tests that were ordered for you today     Open Future Orders        Priority Expected Expires Ordered    NM Bone Scan Whole Body Routine  9/7/2018 7/24/2018    CT Soft Tissue Neck w Contrast Routine 7/24/2018 7/24/2019 7/24/2018    CT Chest/Abdomen/Pelvis w Contrast Routine 7/25/2018 7/24/2019 7/24/2018    MR Brain w/o & w Contrast Routine 7/25/2018 9/7/2018 7/24/2018            Who to contact     If you have questions or need follow up information about today's clinic visit or your schedule please contact [facility] directly at [phone].  Normal or non-critical lab and imaging results will be communicated to you by MyChart, letter or phone within 4 business days after the clinic has received the results. If you do not hear from us within 7 days, please contact the clinic through MyChart or phone. If you have a critical or abnormal lab result, we will notify you by phone as soon as possible.  Submit  "refill requests through [employer] or call your pharmacy and they will forward the refill request to us. Please allow 3 business days for your refill to be completed.          Additional Information About Your Visit        Care EveryWhere ID     This is your Care EveryWhere ID. This could be used by other organizations to access your [facility] medical records  [id number]        Your Vitals Were     Pulse Temperature Height Pulse Oximetry BMI (Body Mass Index)       68 98.3  F (36.8  C) (Tympanic) 1.651 m (5' 5\") 98% 29.02 kg/m2        Blood Pressure from Last 3 Encounters:   07/24/18 102/68   07/13/18 102/70   05/25/18 116/60    Weight from Last 3 Encounters:   07/24/18 79.1 kg (174 lb 6.4 oz)   07/13/18 79.7 kg (175 lb 12.8 oz)   05/25/18 80.7 kg (178 lb)              We Performed the Following     CBC with platelets differential     Comprehensive metabolic panel     DERMATOLOGY REFERRAL     Lactate Dehydrogenase        Primary Care Provider Office Phone # Fax #    Sam Tanner [phone]       [facility] [place] [id number]        Equal Access to Services     [facility] Hospital[facility] : Hadii aad cristino stewardo Sokendall, waaxda luqadaha, qaybta kaalmada adecasieyada, kelly sutherland . So [facility] [phone].    ATENCIÓN: Si habla español, tiene a mckinnon disposición servicios gratuitos de asistencia lingüística. [facility] [phone].    We comply with applicable federal civil rights laws and [place] laws. We do not discriminate on the basis of race, color, national origin, age, disability, sex, sexual orientation, or gender identity.            Thank you!     Thank you for choosing [facility]  for your care. Our goal is always to provide you with excellent care. Hearing back from our patients is one way we can continue to improve our services. Please take a few minutes to complete the written survey that you may receive in the mail after your visit with us. Thank you!   "           Your Updated Medication List - Protect others around you: Learn how to safely use, store and throw away your medicines at www.disposemymeds.org.          This list is accurate as of 7/24/18 11:57 AM.  Always use your most recent med list.                   Brand Name Dispense Instructions for use Diagnosis    ASPIRIN ADULT LOW STRENGTH 81 MG chewable tablet   Generic drug:  aspirin     36 tablet    Take 162 mg by mouth daily    CVA (cerebral infarction)       * CELEXA PO      Take 20 mg by mouth        * citalopram 20 MG tablet    celeXA    90 tablet    TAKE ONE TABLET BY MOUTH ONCE DAILY    Depression, unspecified depression type       cyanocolbalamin 100 MCG tablet    vitamin  B-12     Take 50 mcg by mouth daily.        GABAPENTIN PO      Take 600 mg by mouth 3 times daily        OMEGA-3 FISH OIL PO      Take 1 capsule by mouth daily        oxybutynin 5 MG tablet    DITROPAN    60 tablet    Take 1 tablet (5 mg) by mouth 2 times daily    Frequency of urination       PROTONIX PO      Take 40 mg by mouth        simvastatin 40 MG tablet    ZOCOR    90 tablet    TAKE 1 TABLET BY MOUTH AT BEDTIME    Hyperlipidemia LDL goal <160       VITAMIN C PO           zolpidem 10 MG tablet    AMBIEN    30 tablet    TAKE 1 TABLET BY MOUTH AT BEDTIME AS NEEDED FOR SLEEP    Persistent insomnia       * Notice:  This list has 2 medication(s) that are the same as other medications prescribed for you. Read the directions carefully, and ask your doctor or other care provider to review them with you.

## 2024-06-17 PROBLEM — Z76.89 HEALTH CARE HOME: Status: RESOLVED | Noted: 2018-07-24 | Resolved: 2024-06-17
